# Patient Record
Sex: MALE | Race: BLACK OR AFRICAN AMERICAN | NOT HISPANIC OR LATINO | Employment: OTHER | URBAN - METROPOLITAN AREA
[De-identification: names, ages, dates, MRNs, and addresses within clinical notes are randomized per-mention and may not be internally consistent; named-entity substitution may affect disease eponyms.]

---

## 2023-01-01 ENCOUNTER — CLINICAL SUPPORT (OUTPATIENT)
Dept: PRIMARY CARE | Facility: CLINIC | Age: 73
End: 2023-01-01
Payer: MEDICARE

## 2023-01-01 ENCOUNTER — OFFICE VISIT (OUTPATIENT)
Dept: CARDIOLOGY | Facility: CLINIC | Age: 73
End: 2023-01-01
Payer: MEDICARE

## 2023-01-01 ENCOUNTER — HOSPITAL ENCOUNTER (OUTPATIENT)
Dept: CARDIOLOGY | Facility: HOSPITAL | Age: 73
Discharge: HOME | End: 2023-12-13
Payer: MEDICARE

## 2023-01-01 ENCOUNTER — OFFICE VISIT (OUTPATIENT)
Dept: PRIMARY CARE | Facility: CLINIC | Age: 73
End: 2023-01-01
Payer: MEDICARE

## 2023-01-01 ENCOUNTER — OFFICE VISIT (OUTPATIENT)
Dept: PAIN MEDICINE | Facility: CLINIC | Age: 73
End: 2023-01-01
Payer: MEDICARE

## 2023-01-01 ENCOUNTER — HOSPITAL ENCOUNTER (OUTPATIENT)
Dept: RADIOLOGY | Facility: HOSPITAL | Age: 73
Discharge: HOME | End: 2023-11-01
Payer: MEDICARE

## 2023-01-01 ENCOUNTER — PREP FOR PROCEDURE (OUTPATIENT)
Dept: CARDIOLOGY | Facility: CLINIC | Age: 73
End: 2023-01-01

## 2023-01-01 VITALS
OXYGEN SATURATION: 92 % | BODY MASS INDEX: 19.24 KG/M2 | HEART RATE: 73 BPM | SYSTOLIC BLOOD PRESSURE: 123 MMHG | WEIGHT: 98 LBS | HEIGHT: 60 IN | DIASTOLIC BLOOD PRESSURE: 78 MMHG

## 2023-01-01 VITALS
HEART RATE: 84 BPM | SYSTOLIC BLOOD PRESSURE: 142 MMHG | DIASTOLIC BLOOD PRESSURE: 100 MMHG | RESPIRATION RATE: 18 BRPM | TEMPERATURE: 97.5 F

## 2023-01-01 VITALS
HEART RATE: 74 BPM | SYSTOLIC BLOOD PRESSURE: 123 MMHG | HEIGHT: 60 IN | HEIGHT: 60 IN | BODY MASS INDEX: 18.65 KG/M2 | SYSTOLIC BLOOD PRESSURE: 115 MMHG | WEIGHT: 95 LBS | DIASTOLIC BLOOD PRESSURE: 72 MMHG | BODY MASS INDEX: 18.46 KG/M2 | OXYGEN SATURATION: 91 % | DIASTOLIC BLOOD PRESSURE: 78 MMHG | WEIGHT: 94 LBS | OXYGEN SATURATION: 93 % | HEART RATE: 68 BPM

## 2023-01-01 VITALS
HEART RATE: 62 BPM | BODY MASS INDEX: 18.65 KG/M2 | WEIGHT: 95 LBS | DIASTOLIC BLOOD PRESSURE: 88 MMHG | SYSTOLIC BLOOD PRESSURE: 138 MMHG | HEIGHT: 60 IN

## 2023-01-01 VITALS
BODY MASS INDEX: 18.65 KG/M2 | HEART RATE: 60 BPM | DIASTOLIC BLOOD PRESSURE: 80 MMHG | HEIGHT: 60 IN | SYSTOLIC BLOOD PRESSURE: 150 MMHG | WEIGHT: 95 LBS

## 2023-01-01 DIAGNOSIS — T82.198D INAPPROPRIATE DISCHARGE OF IMPLANTABLE CARDIOVERTER-DEFIBRILLATOR (ICD), SUBSEQUENT ENCOUNTER: ICD-10-CM

## 2023-01-01 DIAGNOSIS — Z98.61 CAD S/P PERCUTANEOUS CORONARY ANGIOPLASTY: ICD-10-CM

## 2023-01-01 DIAGNOSIS — Z89.619 S/P AKA (ABOVE KNEE AMPUTATION) UNILATERAL (MULTI): ICD-10-CM

## 2023-01-01 DIAGNOSIS — Z12.5 SCREENING FOR PROSTATE CANCER: ICD-10-CM

## 2023-01-01 DIAGNOSIS — R91.1 SOLITARY PULMONARY NODULE: ICD-10-CM

## 2023-01-01 DIAGNOSIS — I25.5 ISCHEMIC CARDIOMYOPATHY: ICD-10-CM

## 2023-01-01 DIAGNOSIS — R20.0 ARM NUMBNESS LEFT: ICD-10-CM

## 2023-01-01 DIAGNOSIS — Z95.810 PRESENCE OF DOUBLE CHAMBER AUTOMATIC CARDIOVERTER/DEFIBRILLATOR (AICD): Primary | ICD-10-CM

## 2023-01-01 DIAGNOSIS — Z72.0 CURRENT NICOTINE USE: ICD-10-CM

## 2023-01-01 DIAGNOSIS — E55.9 VITAMIN D DEFICIENCY: ICD-10-CM

## 2023-01-01 DIAGNOSIS — Z01.810 PRE-OPERATIVE CARDIOVASCULAR EXAMINATION, ICD IN PLACE: ICD-10-CM

## 2023-01-01 DIAGNOSIS — I10 BENIGN ESSENTIAL HYPERTENSION: ICD-10-CM

## 2023-01-01 DIAGNOSIS — I73.9 INTERMITTENT CLAUDICATION (CMS-HCC): ICD-10-CM

## 2023-01-01 DIAGNOSIS — I50.9 HEART FAILURE, UNSPECIFIED (MULTI): ICD-10-CM

## 2023-01-01 DIAGNOSIS — R63.6 UNDERWEIGHT: ICD-10-CM

## 2023-01-01 DIAGNOSIS — K21.9 GASTRO-ESOPHAGEAL REFLUX DISEASE WITHOUT ESOPHAGITIS: ICD-10-CM

## 2023-01-01 DIAGNOSIS — Z00.00 ROUTINE GENERAL MEDICAL EXAMINATION AT HEALTH CARE FACILITY: Primary | ICD-10-CM

## 2023-01-01 DIAGNOSIS — G54.7 PHANTOM LIMB (MULTI): ICD-10-CM

## 2023-01-01 DIAGNOSIS — I50.22 CHRONIC SYSTOLIC CONGESTIVE HEART FAILURE, NYHA CLASS 2 (MULTI): ICD-10-CM

## 2023-01-01 DIAGNOSIS — E78.5 HYPERLIPIDEMIA, UNSPECIFIED HYPERLIPIDEMIA TYPE: ICD-10-CM

## 2023-01-01 DIAGNOSIS — Z13.9 SCREENING FOR CONDITION: ICD-10-CM

## 2023-01-01 DIAGNOSIS — F17.200 CURRENT SMOKER: ICD-10-CM

## 2023-01-01 DIAGNOSIS — E78.2 MIXED HYPERLIPIDEMIA: ICD-10-CM

## 2023-01-01 DIAGNOSIS — I25.5 CARDIOMYOPATHY, ISCHEMIC: ICD-10-CM

## 2023-01-01 DIAGNOSIS — Z95.810 PRE-OPERATIVE CARDIOVASCULAR EXAMINATION, ICD IN PLACE: ICD-10-CM

## 2023-01-01 DIAGNOSIS — J44.9 CHRONIC OBSTRUCTIVE PULMONARY DISEASE, UNSPECIFIED COPD TYPE (MULTI): ICD-10-CM

## 2023-01-01 DIAGNOSIS — I25.10 CAD S/P PERCUTANEOUS CORONARY ANGIOPLASTY: ICD-10-CM

## 2023-01-01 DIAGNOSIS — F17.200 CURRENT EVERY DAY SMOKER: ICD-10-CM

## 2023-01-01 DIAGNOSIS — Z00.00 PREVENTATIVE HEALTH CARE: Primary | ICD-10-CM

## 2023-01-01 DIAGNOSIS — Z79.899 DRUG THERAPY: ICD-10-CM

## 2023-01-01 DIAGNOSIS — G54.6 PHANTOM PAIN AFTER AMPUTATION OF LOWER EXTREMITY (MULTI): Primary | ICD-10-CM

## 2023-01-01 DIAGNOSIS — I25.10 CORONARY ARTERY DISEASE INVOLVING NATIVE HEART WITHOUT ANGINA PECTORIS, UNSPECIFIED VESSEL OR LESION TYPE: ICD-10-CM

## 2023-01-01 DIAGNOSIS — I25.85 CHRONIC CORONARY MICROVASCULAR DYSFUNCTION: ICD-10-CM

## 2023-01-01 DIAGNOSIS — I47.20 VT (VENTRICULAR TACHYCARDIA) (MULTI): ICD-10-CM

## 2023-01-01 DIAGNOSIS — I25.10 CORONARY ARTERY DISEASE INVOLVING NATIVE CORONARY ARTERY OF NATIVE HEART WITHOUT ANGINA PECTORIS: ICD-10-CM

## 2023-01-01 DIAGNOSIS — Z95.810 PRESENCE OF DOUBLE CHAMBER AUTOMATIC CARDIOVERTER/DEFIBRILLATOR (AICD): ICD-10-CM

## 2023-01-01 DIAGNOSIS — I73.9 PVD (PERIPHERAL VASCULAR DISEASE) (CMS-HCC): ICD-10-CM

## 2023-01-01 DIAGNOSIS — R06.02 SOB (SHORTNESS OF BREATH): Primary | ICD-10-CM

## 2023-01-01 DIAGNOSIS — N52.9 ERECTILE DYSFUNCTION, UNSPECIFIED ERECTILE DYSFUNCTION TYPE: ICD-10-CM

## 2023-01-01 DIAGNOSIS — G54.7: ICD-10-CM

## 2023-01-01 DIAGNOSIS — F17.210 NICOTINE DEPENDENCE, CIGARETTES, UNCOMPLICATED: ICD-10-CM

## 2023-01-01 LAB
CALCIDIOL (25 OH VITAMIN D3) (NG/ML) IN SER/PLAS: 29 NG/ML
CALCIDIOL (25 OH VITAMIN D3) (NG/ML) IN SER/PLAS: 39 NG/ML

## 2023-01-01 PROCEDURE — 3008F BODY MASS INDEX DOCD: CPT | Performed by: INTERNAL MEDICINE

## 2023-01-01 PROCEDURE — G0439 PPPS, SUBSEQ VISIT: HCPCS | Performed by: FAMILY MEDICINE

## 2023-01-01 PROCEDURE — 4004F PT TOBACCO SCREEN RCVD TLK: CPT | Performed by: FAMILY MEDICINE

## 2023-01-01 PROCEDURE — 1159F MED LIST DOCD IN RCRD: CPT | Performed by: NURSE PRACTITIONER

## 2023-01-01 PROCEDURE — 99214 OFFICE O/P EST MOD 30 MIN: CPT | Performed by: INTERNAL MEDICINE

## 2023-01-01 PROCEDURE — 93000 ELECTROCARDIOGRAM COMPLETE: CPT | Performed by: INTERNAL MEDICINE

## 2023-01-01 PROCEDURE — 4004F PT TOBACCO SCREEN RCVD TLK: CPT | Performed by: NURSE PRACTITIONER

## 2023-01-01 PROCEDURE — 99213 OFFICE O/P EST LOW 20 MIN: CPT | Performed by: NURSE PRACTITIONER

## 2023-01-01 PROCEDURE — 1125F AMNT PAIN NOTED PAIN PRSNT: CPT | Performed by: NURSE PRACTITIONER

## 2023-01-01 PROCEDURE — 4004F PT TOBACCO SCREEN RCVD TLK: CPT | Performed by: INTERNAL MEDICINE

## 2023-01-01 PROCEDURE — 3077F SYST BP >= 140 MM HG: CPT | Performed by: INTERNAL MEDICINE

## 2023-01-01 PROCEDURE — 1159F MED LIST DOCD IN RCRD: CPT | Performed by: FAMILY MEDICINE

## 2023-01-01 PROCEDURE — 1125F AMNT PAIN NOTED PAIN PRSNT: CPT | Performed by: FAMILY MEDICINE

## 2023-01-01 PROCEDURE — 71271 CT THORAX LUNG CANCER SCR C-: CPT | Performed by: RADIOLOGY

## 2023-01-01 PROCEDURE — 3079F DIAST BP 80-89 MM HG: CPT | Performed by: INTERNAL MEDICINE

## 2023-01-01 PROCEDURE — 99214 OFFICE O/P EST MOD 30 MIN: CPT | Performed by: FAMILY MEDICINE

## 2023-01-01 PROCEDURE — 99215 OFFICE O/P EST HI 40 MIN: CPT | Performed by: INTERNAL MEDICINE

## 2023-01-01 PROCEDURE — 3077F SYST BP >= 140 MM HG: CPT | Performed by: NURSE PRACTITIONER

## 2023-01-01 PROCEDURE — 99213 OFFICE O/P EST LOW 20 MIN: CPT | Mod: ZK | Performed by: NURSE PRACTITIONER

## 2023-01-01 PROCEDURE — 3075F SYST BP GE 130 - 139MM HG: CPT | Performed by: INTERNAL MEDICINE

## 2023-01-01 PROCEDURE — 3074F SYST BP LT 130 MM HG: CPT | Performed by: FAMILY MEDICINE

## 2023-01-01 PROCEDURE — 1159F MED LIST DOCD IN RCRD: CPT | Performed by: INTERNAL MEDICINE

## 2023-01-01 PROCEDURE — 82306 VITAMIN D 25 HYDROXY: CPT

## 2023-01-01 PROCEDURE — 93290 INTERROG DEV EVAL ICPMS IP: CPT | Performed by: INTERNAL MEDICINE

## 2023-01-01 PROCEDURE — 1125F AMNT PAIN NOTED PAIN PRSNT: CPT | Performed by: INTERNAL MEDICINE

## 2023-01-01 PROCEDURE — 3078F DIAST BP <80 MM HG: CPT | Performed by: FAMILY MEDICINE

## 2023-01-01 PROCEDURE — 99397 PER PM REEVAL EST PAT 65+ YR: CPT | Performed by: FAMILY MEDICINE

## 2023-01-01 PROCEDURE — 1170F FXNL STATUS ASSESSED: CPT | Performed by: FAMILY MEDICINE

## 2023-01-01 PROCEDURE — 93283 PRGRMG EVAL IMPLANTABLE DFB: CPT | Performed by: INTERNAL MEDICINE

## 2023-01-01 PROCEDURE — 93283 PRGRMG EVAL IMPLANTABLE DFB: CPT

## 2023-01-01 PROCEDURE — 3080F DIAST BP >= 90 MM HG: CPT | Performed by: NURSE PRACTITIONER

## 2023-01-01 PROCEDURE — 71271 CT THORAX LUNG CANCER SCR C-: CPT

## 2023-01-01 RX ORDER — ALBUTEROL SULFATE 0.83 MG/ML
SOLUTION RESPIRATORY (INHALATION)
COMMUNITY

## 2023-01-01 RX ORDER — GABAPENTIN 300 MG/1
CAPSULE ORAL
COMMUNITY
Start: 2015-09-18 | End: 2023-01-01

## 2023-01-01 RX ORDER — PANTOPRAZOLE SODIUM 40 MG/1
TABLET, DELAYED RELEASE ORAL
Qty: 90 TABLET | Refills: 3 | Status: SHIPPED | OUTPATIENT
Start: 2023-01-01 | End: 2024-01-01

## 2023-01-01 RX ORDER — METHADONE HYDROCHLORIDE 5 MG/1
5 TABLET ORAL EVERY 6 HOURS
COMMUNITY

## 2023-01-01 RX ORDER — AMLODIPINE BESYLATE 5 MG/1
5 TABLET ORAL DAILY
COMMUNITY

## 2023-01-01 RX ORDER — LISINOPRIL 5 MG/1
5 TABLET ORAL DAILY
COMMUNITY

## 2023-01-01 RX ORDER — CHOLECALCIFEROL (VITAMIN D3) 25 MCG
TABLET ORAL
COMMUNITY

## 2023-01-01 RX ORDER — METHADONE HYDROCHLORIDE 5 MG/1
5 TABLET ORAL EVERY 6 HOURS PRN
Qty: 120 TABLET | Refills: 0 | Status: SHIPPED | OUTPATIENT
Start: 2024-01-01 | End: 2024-01-01

## 2023-01-01 RX ORDER — SILDENAFIL 100 MG/1
TABLET, FILM COATED ORAL
Status: ON HOLD | COMMUNITY
Start: 2021-12-09 | End: 2024-01-01 | Stop reason: ALTCHOICE

## 2023-01-01 RX ORDER — NAPROXEN SODIUM 220 MG/1
1 TABLET, FILM COATED ORAL DAILY
COMMUNITY

## 2023-01-01 RX ORDER — ATORVASTATIN CALCIUM 40 MG/1
40 TABLET, FILM COATED ORAL NIGHTLY
COMMUNITY

## 2023-01-01 RX ORDER — ALBUTEROL SULFATE 90 UG/1
AEROSOL, METERED RESPIRATORY (INHALATION)
COMMUNITY
Start: 2023-01-01

## 2023-01-01 RX ORDER — NALOXONE HYDROCHLORIDE 4 MG/.1ML
SPRAY NASAL
COMMUNITY
Start: 2020-02-03

## 2023-01-01 RX ORDER — FUROSEMIDE 20 MG/1
20 TABLET ORAL DAILY
COMMUNITY

## 2023-01-01 RX ORDER — GABAPENTIN 300 MG/1
CAPSULE ORAL
Qty: 1080 CAPSULE | Refills: 3 | Status: SHIPPED | OUTPATIENT
Start: 2023-01-01 | End: 2024-01-01

## 2023-01-01 RX ORDER — METOPROLOL SUCCINATE 50 MG/1
50 TABLET, EXTENDED RELEASE ORAL EVERY EVENING
COMMUNITY
End: 2023-01-01 | Stop reason: WASHOUT

## 2023-01-01 RX ORDER — SODIUM CHLORIDE 9 MG/ML
100 INJECTION, SOLUTION INTRAVENOUS CONTINUOUS
Status: CANCELLED | OUTPATIENT
Start: 2023-01-01

## 2023-01-01 RX ORDER — METHADONE HYDROCHLORIDE 5 MG/1
5 TABLET ORAL EVERY 6 HOURS PRN
Qty: 120 TABLET | Refills: 0 | Status: SHIPPED | OUTPATIENT
Start: 2023-01-01 | End: 2024-01-01 | Stop reason: HOSPADM

## 2023-01-01 RX ORDER — METHADONE HYDROCHLORIDE 5 MG/1
5 TABLET ORAL EVERY 6 HOURS PRN
Qty: 120 TABLET | Refills: 0 | Status: SHIPPED | OUTPATIENT
Start: 2024-01-01 | End: 2024-01-01 | Stop reason: HOSPADM

## 2023-01-01 RX ORDER — TAMSULOSIN HYDROCHLORIDE 0.4 MG/1
1 CAPSULE ORAL DAILY
COMMUNITY
Start: 2017-08-08 | End: 2023-01-01 | Stop reason: ALTCHOICE

## 2023-01-01 RX ORDER — NITROGLYCERIN 0.4 MG/1
TABLET SUBLINGUAL
COMMUNITY
Start: 2019-01-10

## 2023-01-01 RX ORDER — CLOPIDOGREL BISULFATE 75 MG/1
75 TABLET ORAL DAILY
COMMUNITY

## 2023-01-01 RX ORDER — METOPROLOL SUCCINATE 50 MG/1
50 TABLET, EXTENDED RELEASE ORAL EVERY EVENING
Qty: 90 TABLET | Refills: 3 | Status: SHIPPED | OUTPATIENT
Start: 2023-01-01 | End: 2024-01-01

## 2023-01-01 ASSESSMENT — ACTIVITIES OF DAILY LIVING (ADL)
TAKING_MEDICATION: INDEPENDENT
MANAGING_FINANCES: INDEPENDENT
BATHING: INDEPENDENT
DRESSING: INDEPENDENT
DOING_HOUSEWORK: INDEPENDENT
GROCERY_SHOPPING: INDEPENDENT

## 2023-01-01 ASSESSMENT — ENCOUNTER SYMPTOMS
OCCASIONAL FEELINGS OF UNSTEADINESS: 0
LOSS OF SENSATION IN FEET: 1
DEPRESSION: 0

## 2023-01-01 ASSESSMENT — PAIN SCALES - GENERAL
PAINLEVEL_OUTOF10: 8
PAINLEVEL: 8

## 2023-01-01 ASSESSMENT — COLUMBIA-SUICIDE SEVERITY RATING SCALE - C-SSRS
1. IN THE PAST MONTH, HAVE YOU WISHED YOU WERE DEAD OR WISHED YOU COULD GO TO SLEEP AND NOT WAKE UP?: NO
2. HAVE YOU ACTUALLY HAD ANY THOUGHTS OF KILLING YOURSELF?: NO
6. HAVE YOU EVER DONE ANYTHING, STARTED TO DO ANYTHING, OR PREPARED TO DO ANYTHING TO END YOUR LIFE?: NO

## 2023-01-01 ASSESSMENT — PATIENT HEALTH QUESTIONNAIRE - PHQ9
1. LITTLE INTEREST OR PLEASURE IN DOING THINGS: NOT AT ALL
2. FEELING DOWN, DEPRESSED OR HOPELESS: NOT AT ALL
SUM OF ALL RESPONSES TO PHQ9 QUESTIONS 1 AND 2: 0

## 2023-01-01 ASSESSMENT — PAIN - FUNCTIONAL ASSESSMENT: PAIN_FUNCTIONAL_ASSESSMENT: 0-10

## 2023-06-08 PROBLEM — G54.7: Status: ACTIVE | Noted: 2023-01-01

## 2023-06-08 PROBLEM — Z72.0 CURRENT NICOTINE USE: Status: ACTIVE | Noted: 2023-01-01

## 2023-06-08 PROBLEM — Z79.899 DRUG THERAPY: Status: ACTIVE | Noted: 2023-01-01

## 2023-06-08 PROBLEM — I25.10 CORONARY ARTERY DISEASE INVOLVING NATIVE HEART WITHOUT ANGINA PECTORIS: Status: ACTIVE | Noted: 2023-01-01

## 2023-06-08 PROBLEM — E78.5 HYPERLIPIDEMIA: Status: ACTIVE | Noted: 2023-01-01

## 2023-06-08 PROBLEM — E55.9 VITAMIN D DEFICIENCY: Status: ACTIVE | Noted: 2023-01-01

## 2023-06-08 PROBLEM — J44.9 CHRONIC OBSTRUCTIVE PULMONARY DISEASE (MULTI): Status: ACTIVE | Noted: 2023-01-01

## 2023-06-08 PROBLEM — N52.9 ERECTILE DYSFUNCTION: Status: ACTIVE | Noted: 2023-01-01

## 2023-06-08 PROBLEM — Z00.00 ROUTINE GENERAL MEDICAL EXAMINATION AT HEALTH CARE FACILITY: Status: ACTIVE | Noted: 2023-01-01

## 2023-06-08 NOTE — PATIENT INSTRUCTIONS
Patient Discussion/Summary     Patient's med [employer] insurance wants annual preventative visit by itself.  We will do Medicare wellness today and preventative next time.     --------  UTD on PCV13. Due for PPSV 23.   Due for Tdap.  UTD on coronavirus series including 2nd booster.     screen for hepatitis C was nonreactive in December 2022.      Screening for colon cancer.  Cologuard was nonreactive December 2022.  We will plan to recheck around December 2025.       Screen for prostate cancer. PSA was 1.48, totally normal. -->> We will check annually.  -------     New labs reviewed:  - Vitamin D deficiency, 29, was 22, 32, 70, 53, 53, 26, 38, 31, 26. Would like to 75 - 100. Is now on 2000 units. -->>  Increase to 4000 to 5000 units daily.  We will recheck  level in 3 to 6 months.     Regarding previous labs:  -Drug therapy, screening for conditions. A1c was 5.2, was 5.5, so no diabetes.  - hyperlipidemia, HDL 36, was 33, 36, goal is 45 or more. LDL 61, was 48, 57, goal is less than 70. On Simvastatin from cardiology. -->> f/u with cardiology.  - Not anemic but is macrocytic on the CBC. Hemoglobin WNL. Fairly stable.B12 and folic acid have been grossly within normal limits.-->> Will follow. We will plan to just check CBC from now on unless anemia or other problems show up again.     COPD. --->> Follow-up as scheduled with Dr. Guillaume.      Smoking about 4 cigs/day, down from one fourth pack a day, down from half pack a day, down from a pack and a half a day. -->> Keep up the good work cutting back. Advised to completely quit.     Coronary artery disease, implanted defibrillator, on Plavix, history of angina, CHF, intermittent claudication, peripheral vascular disease. He does see Rhianna Suarez and Michel/Bridger. In 2019 had 3 cardiac stents put in. Patient does notice shortness of breath has improved at least a little. -->> follow-up with cardiology as planned.     Phantom limb pain, also chronic pain, is on gabapentin 600mg  TID for the phantom limb pain, and is in general it well enough, maybe once or twice a week is getting the pain so severe that he takes an extra 2 gabapentin and that controls it pretty well. Does note sometimes he takes only one a day, sometimes takes 4. Sees pain management for chronic pain. Is prescribed methadone there. -->> Follow up as planned. Will prescribe a larger quantity of gabapentin so patient does not run out.     Erectile dysfunction, doing a little better now that he is on Cialis/tadalafil. -->> Will refill as needed.         - Patient will return in about 3 months for annual preventative visit with vit D review, only. (Trying to make it short appointment so we can get away with just billing for preventative visit.)  - Patient will return here about a week before the appointment to get vitamin D level drawn.

## 2023-06-08 NOTE — PROGRESS NOTES
Subjective   Patient ID: Luis Peacock Jr. is a 73 y.o. male who presents for Medicare Wellness Exam (Pt in today for his Medicare Wellness Exam).          Patient Discussion/Summary     Patient's med [employer] insurance wants annual preventative visit by itself.  We will do Medicare wellness today and preventative next time.     --------  UTD on PCV13. Due for PPSV 23.   Due for Tdap.  UTD on coronavirus series including 2nd booster.     screen for hepatitis C was nonreactive in December 2022.      Screening for colon cancer.  Cologuard was nonreactive December 2022.  We will plan to recheck around December 2025.       Screen for prostate cancer. PSA was 1.48, totally normal. -->> We will check annually.  -------     New labs reviewed:  - Vitamin D deficiency, 29, was 22, 32, 70, 53, 53, 26, 38, 31, 26. Would like to 75 - 100. Is now on 2000 units. -->>  Increase to 4000 to 5000 units daily.  We will recheck  level in 3 to 6 months.     Regarding previous labs:  -Drug therapy, screening for conditions. A1c was 5.2, was 5.5, so no diabetes.  - hyperlipidemia, HDL 36, was 33, 36, goal is 45 or more. LDL 61, was 48, 57, goal is less than 70. On Simvastatin from cardiology. -->> f/u with cardiology.  - Not anemic but is macrocytic on the CBC. Hemoglobin WNL. Fairly stable.B12 and folic acid have been grossly within normal limits.-->> Will follow. We will plan to just check CBC from now on unless anemia or other problems show up again.     COPD. --->> Follow-up as scheduled with Dr. Guillaume.      Smoking about 4 cigs/day, down from one fourth pack a day, down from half pack a day, down from a pack and a half a day. -->> Keep up the good work cutting back. Advised to completely quit.     Coronary artery disease, implanted defibrillator, on Plavix, history of angina, CHF, intermittent claudication, peripheral vascular disease. He does see Rhianna Suarez and Michel/Bridger. In 2019 had 3 cardiac stents put in. Patient does notice  shortness of breath has improved at least a little. -->> follow-up with cardiology as planned.     Phantom limb pain, also chronic pain, is on gabapentin 600mg TID for the phantom limb pain, and is in general it well enough, maybe once or twice a week is getting the pain so severe that he takes an extra 2 gabapentin and that controls it pretty well. Does note sometimes he takes only one a day, sometimes takes 4. Sees pain management for chronic pain. Is prescribed methadone there. -->> Follow up as planned. Will prescribe a larger quantity of gabapentin so patient does not run out.     Erectile dysfunction, doing a little better now that he is on Cialis/tadalafil. -->> Will refill as needed.         - Patient will return in about 3 months for annual preventative visit with vit D review, only. (Trying to make it short appointment so we can get away with just billing for preventative visit.)  - Patient will return here about a week before the appointment to get vitamin D level drawn.        Subjective   Reason for Visit: Luis Peacock Jr. is an 73 y.o. male here for a Medicare Wellness visit.                 Patient Care Team:  Aiden Bui DO as PCP - General     Review of Systems  Denies N/V/D/C, no HA/S/V, no CP/SOB. Denies fevers/chills.   All other systems were negative.    Objective   Vitals:  /78 (BP Location: Left arm, Patient Position: Sitting)   Pulse 74   Ht 1.524 m (5')   Wt (!) 43.1 kg (95 lb)   SpO2 91%   BMI 18.55 kg/m²       Physical Exam  Gen: NAD  eyes: EOMI, PERRLA  ENT: hearing grossly intact, no nasal discharge  resp: CTABL, without R/R  heart: RRR without MRG  GI: abd: S/ND/NT, BS+  lymph: no axillary, cervical, supraclavicular lymphadenopathy noted   MS: ambulates with crutches, post R AKA.  derm: no rashes or lesions noted  neuro: CN II-XII grossly intact  psych: A&Ox3      Assessment/Plan   Problem List Items Addressed This Visit    None  Visit Diagnoses       Routine general  medical examination at health care facility    -  Primary    Vitamin D deficiency        Relevant Orders    Vitamin D 25-Hydroxy,Total    Drug therapy        Hyperlipidemia, unspecified hyperlipidemia type        Chronic obstructive pulmonary disease, unspecified COPD type (CMS/[facility])        Coronary artery disease involving native heart without angina pectoris, unspecified vessel or lesion type        Current nicotine use        Phantom limb (CMS/[facility])        Erectile dysfunction, unspecified erectile dysfunction type

## 2023-08-31 NOTE — PROGRESS NOTES
Subjective   Patient ID: Lius Peacock Jr. is a 73 y.o. male who presents for Labs Only (Pt in today for lab draw).    HPI     Review of Systems    Objective   There were no vitals taken for this visit.    Physical Exam    Assessment/Plan

## 2023-09-07 PROBLEM — Z00.00 PREVENTATIVE HEALTH CARE: Status: ACTIVE | Noted: 2023-01-01

## 2023-09-07 NOTE — PROGRESS NOTES
Subjective   Patient ID: Luis Peacock Jr. is a 73 y.o. male who presents for annual preventative visit and vitamin D lab review.    Review of Systems  Denies N/V/D/C, no HA/S/V, denies rashes/lesions, no CP/SOB. Denies fevers/chills.  All other systems were negative.    Objective   Physical Exam  Gen: NAD  eyes: EOMI, PERRLA  ENT: hearing grossly intact, no nasal discharge  resp: CTABL, without R/R  heart: RRR without MRG  GI: abd: S/ND/NT, BS+  lymph: no axillary, cervical, supraclavicular lymphadenopathy noted   MS: ambulates with crutches, post R AKA.  derm: no rashes or lesions noted  neuro: CN II-XII grossly intact  psych: A&Ox3    Assessment/Plan   Diagnoses and all orders for this visit:  Preventative health care  -     CBC and Auto Differential; Future  -     Comprehensive Metabolic Panel; Future  -     TSH with reflex to Free T4 if abnormal; Future  -     Magnesium; Future  -     Lipid Panel; Future  -     Hemoglobin A1C; Future  -     Urinalysis with Reflex Microscopic; Future  -     Vitamin D 25-Hydroxy,Total (for eval of Vitamin D levels); Future  Vitamin D deficiency  -     Vitamin D 25-Hydroxy,Total (for eval of Vitamin D levels); Future  Drug therapy  -     CBC and Auto Differential; Future  -     Comprehensive Metabolic Panel; Future  -     Magnesium; Future  -     Urinalysis with Reflex Microscopic; Future  Screening for condition  -     TSH with reflex to Free T4 if abnormal; Future  -     Lipid Panel; Future  -     Hemoglobin A1C; Future  Screening for prostate cancer  -     Prostate Spec.Ag,Screen; Future        Patient Discussion/Summary     Patient's med [employer] insurance wants annual preventative visit by itself.  We will do Medicare wellness today and preventative next time.     --------  UTD on PCV13. Due for PCV20.  Due for Tdap.  UTD on coronavirus series including bivalent booster.     screen for hepatitis C was nonreactive in December 2022.      Screening for colon cancer.  Cologuard was  nonreactive December 2022.  We will plan to recheck around December 2025.       Screen for prostate cancer. PSA was 1.48, totally normal. -->> We will check annually.  -------     New labs reviewed:  - Vitamin D deficiency, 39, was 29, 22, 32, 70, 53, 53, 26, 38, 31, 26. Would like to 75 - 100. Is now on 2000 units. -->> Increase to 4000 to 5000 units daily.  We will recheck  level in 3 to 6 months.     Regarding previous labs:  -Drug therapy, screening for conditions. A1c was 5.2, was 5.5, so no diabetes.  - hyperlipidemia, HDL 36, was 33, 36, goal is 45 or more. LDL 61, was 48, 57, goal is less than 70. On Simvastatin from cardiology. -->> f/u with cardiology.  - Not anemic but is macrocytic on the CBC. Hemoglobin WNL. Fairly stable.B12 and folic acid have been grossly within normal limits.-->> Will follow. We will plan to just check CBC from now on unless anemia or other problems show up again.     COPD. --->> Follow-up as scheduled with Dr. Guillaume.      Smoking about 4 cigs/day, down from one fourth pack a day, down from half pack a day, down from a pack and a half a day. -->> Keep up the good work cutting back. Advised to completely quit.     Coronary artery disease, implanted defibrillator, on Plavix, history of angina, CHF, intermittent claudication, peripheral vascular disease. He does see Rhianna Suarez and Michel/Bridger. In 2019 had 3 cardiac stents put in. Patient does notice shortness of breath has improved at least a little. -->> follow-up with cardiology as planned.     Phantom limb pain, also chronic pain, is on gabapentin 600mg TID for the phantom limb pain, and is in general it well enough, maybe once or twice a week is getting the pain so severe that he takes an extra 2 gabapentin and that controls it pretty well. Does note sometimes he takes only one a day, sometimes takes 4. Sees pain management for chronic pain. Is prescribed methadone there. -->> Follow up as planned. Will prescribe a larger quantity  of gabapentin so patient does not run out.     Erectile dysfunction, doing a little better now that he is on Cialis/tadalafil. -->> Will refill as needed.        - Return in about 6 months for annual lab review.  - Return here about a week before your next appointment to get fasting annual labs including PSA screening drawn.

## 2023-09-07 NOTE — PATIENT INSTRUCTIONS
Patient Discussion/Summary     Patient's med [employer] insurance wants annual preventative visit by itself.  We will do Medicare wellness today and preventative next time.     --------  UTD on PCV13. Due for PCV20.  Due for Tdap.  UTD on coronavirus series including bivalent booster.     screen for hepatitis C was nonreactive in December 2022.      Screening for colon cancer.  Cologuard was nonreactive December 2022.  We will plan to recheck around December 2025.       Screen for prostate cancer. PSA was 1.48, totally normal. -->> We will check annually.  -------     New labs reviewed:  - Vitamin D deficiency, 39, was 29, 22, 32, 70, 53, 53, 26, 38, 31, 26. Would like to 75 - 100. Is now on 2000 units. -->> Increase to 4000 to 5000 units daily.  We will recheck  level in 3 to 6 months.     Regarding previous labs:  -Drug therapy, screening for conditions. A1c was 5.2, was 5.5, so no diabetes.  - hyperlipidemia, HDL 36, was 33, 36, goal is 45 or more. LDL 61, was 48, 57, goal is less than 70. On Simvastatin from cardiology. -->> f/u with cardiology.  - Not anemic but is macrocytic on the CBC. Hemoglobin WNL. Fairly stable.B12 and folic acid have been grossly within normal limits.-->> Will follow. We will plan to just check CBC from now on unless anemia or other problems show up again.     COPD. --->> Follow-up as scheduled with Dr. Guillaume.      Smoking about 4 cigs/day, down from one fourth pack a day, down from half pack a day, down from a pack and a half a day. -->> Keep up the good work cutting back. Advised to completely quit.     Coronary artery disease, implanted defibrillator, on Plavix, history of angina, CHF, intermittent claudication, peripheral vascular disease. He does see Rhianna Suarez and Michel/Bridger. In 2019 had 3 cardiac stents put in. Patient does notice shortness of breath has improved at least a little. -->> follow-up with cardiology as planned.     Phantom limb pain, also chronic pain, is on gabapentin  600mg TID for the phantom limb pain, and is in general it well enough, maybe once or twice a week is getting the pain so severe that he takes an extra 2 gabapentin and that controls it pretty well. Does note sometimes he takes only one a day, sometimes takes 4. Sees pain management for chronic pain. Is prescribed methadone there. -->> Follow up as planned. Will prescribe a larger quantity of gabapentin so patient does not run out.     Erectile dysfunction, doing a little better now that he is on Cialis/tadalafil. -->> Will refill as needed.         - Return in about 6 months for annual lab review.  - Return here about a week before your next appointment to get fasting annual labs including PSA screening drawn.

## 2023-10-11 PROBLEM — R06.89 DIFFICULTY BREATHING: Status: ACTIVE | Noted: 2023-01-01

## 2023-10-11 PROBLEM — I10 BENIGN ESSENTIAL HYPERTENSION: Status: ACTIVE | Noted: 2023-01-01

## 2023-10-11 PROBLEM — Z95.810 PRESENCE OF DOUBLE CHAMBER AUTOMATIC CARDIOVERTER/DEFIBRILLATOR (AICD): Status: ACTIVE | Noted: 2023-01-01

## 2023-10-11 PROBLEM — Z89.619: Status: ACTIVE | Noted: 2023-01-01

## 2023-10-11 PROBLEM — M79.605 PAIN IN BOTH LOWER EXTREMITIES: Status: ACTIVE | Noted: 2023-01-01

## 2023-10-11 PROBLEM — I73.9 INTERMITTENT CLAUDICATION (CMS-HCC): Status: ACTIVE | Noted: 2023-01-01

## 2023-10-11 PROBLEM — R63.6 UNDERWEIGHT: Status: ACTIVE | Noted: 2023-01-01

## 2023-10-11 PROBLEM — I25.10 CAD S/P PERCUTANEOUS CORONARY ANGIOPLASTY: Status: ACTIVE | Noted: 2023-01-01

## 2023-10-11 PROBLEM — I73.9 PVD (PERIPHERAL VASCULAR DISEASE) (CMS-HCC): Status: ACTIVE | Noted: 2023-01-01

## 2023-10-11 PROBLEM — F17.200 CURRENT EVERY DAY SMOKER: Status: ACTIVE | Noted: 2023-01-01

## 2023-10-11 PROBLEM — I25.10 CAD (CORONARY ARTERY DISEASE): Status: ACTIVE | Noted: 2023-01-01

## 2023-10-11 PROBLEM — M47.816 LUMBAR SPONDYLOSIS: Status: ACTIVE | Noted: 2023-01-01

## 2023-10-11 PROBLEM — I25.5 ISCHEMIC CARDIOMYOPATHY: Status: ACTIVE | Noted: 2023-01-01

## 2023-10-11 PROBLEM — Z98.61 CAD S/P PERCUTANEOUS CORONARY ANGIOPLASTY: Status: ACTIVE | Noted: 2023-01-01

## 2023-10-11 PROBLEM — M79.605 LEFT LEG PAIN: Status: ACTIVE | Noted: 2023-01-01

## 2023-10-11 PROBLEM — K21.9 GERD (GASTROESOPHAGEAL REFLUX DISEASE): Status: ACTIVE | Noted: 2023-01-01

## 2023-10-11 PROBLEM — I50.9 CHF (CONGESTIVE HEART FAILURE), NYHA CLASS II (MULTI): Status: ACTIVE | Noted: 2023-01-01

## 2023-10-11 PROBLEM — M79.604 PAIN IN BOTH LOWER EXTREMITIES: Status: ACTIVE | Noted: 2023-01-01

## 2023-10-11 PROBLEM — R82.998 WHITE BLOOD CELLS PRESENT ON URINE MICROSCOPY: Status: ACTIVE | Noted: 2023-01-01

## 2023-10-11 PROBLEM — M54.9 BACK PAIN: Status: ACTIVE | Noted: 2023-01-01

## 2023-12-11 NOTE — PROGRESS NOTES
Subjective   Patient ID: Luis Peacock Jr. is a 73 y.o. male who presents for No chief complaint on file..  HPI  73 years old, male here today for follow up to refill medications. He is known in this clinic because of chronic neck pain, chronic back pain, chronic right shoulder pain. He is maintained on Methadone and he confirms that they are still moderately beneficial in controlling his pain issues. His level of pain is about 4 to 5/10.   [place]AR[place] obtained and reviewed,  no abuse or misuse with prescribed medication noted.  He is able to function and perform activities of daily living on an independent basis.   Review of Systems  Review of systems x 10 is negative.  No recent injury, falls reported.  No recent change in medical condition reported.  No recent weakness reported.  Still able to control bowel and bladder function.  Denies fever, cough, shortness of breath at this time.  No interval change with medical/health issues noted.  Objective   Physical Exam  Awake, alert, , no acute distress, appropriate.  Spine is of normal curvature.  Full ROM on all 4 extremities,sensation and motor function intact, no vascular compromise,  no pedal edema, normal gait using crutches with ambulation.  Skin warm and dry, intact, turgor is normal.  Denies any numbness, tingling.  Right side AKA.  BP (!) 142/100   Pulse 84   Temp 36.4 °C (97.5 °F)   Resp 18       Assessment/Plan     I have personally reviewed the OARRS report for this patient. I have considered the risks of abuse, dependence, addiction and diversion. I believe that it is clinically appropriate for this patient to be prescribed this medication based on documented diagnosis.  I believe the benefits of the continuation of the opiate outweighs the risk. Patient has described positive response to the present medication therapy. Patient denies any side effects associated with the usage of the medication. Patient did not elicit any signs supportive of misuse or abuse.  The review of the [place] Automated Reporting prescription is not suggestive of any worrisome pattern. Patient believes the usage of this medication has improved the quality of life and allow him to participate in day-to-day activity. Therefore I recommend the continuation of this medication and I will be refilling the medication prescription.  Continue Methadone from this clinic, Gabapentin from his PCP.  Continue doing safe guidelines for preventing spread of Corona virus.  Advised to follow up in 3 months or as needed basis.  Explained plan to this patient, and patient verbalized understanding and agreement with the plan.     Provider Impressions     Chronic neck pain associated with to cervicalgia, cervical spondylosis, osteoarthritic pains, this pain is under control at this time.  Chronic back pain associated with lumbago, lumbar spondylosis, lumbar radiculopathy,  , vascular disease. He is s/p right AKA with phantom pain.  Chronic right shoulder pain associated with osteoarthritic pains, history of torn rotator cuff.    Johanna Posada, MICAELA-CNP 12/11/23 10:36 AM

## 2023-12-12 NOTE — PROGRESS NOTES
Subjective   Patient ID: Luis Peacock Jr. is a 73 y.o. male who presents for annual preventative visit and vitamin D lab review.    Review of Systems  Denies N/V/D/C, no S/V, denies rashes/lesions, no CP. Denies fevers/chills.  Positive for occasional headache, getting shortness of breath at rest at times.  Also some abdominal pain.  All other systems were negative.    Objective   Physical Exam  Gen: NAD  eyes: EOMI, PERRLA  ENT: hearing grossly intact, no nasal discharge  resp: CTABL, without R/R  heart: RRR without MRG  GI: abd: S/ND/NT, BS+  lymph: no axillary, cervical, supraclavicular lymphadenopathy noted   MS: ambulates with crutches, post R AKA.  derm: no rashes or lesions noted  neuro: CN II-XII grossly intact  psych: A&Ox3    Assessment/Plan   Diagnoses and all orders for this visit:  SOB (shortness of breath)  Chronic obstructive pulmonary disease, unspecified COPD type (CMS/HCC)  Coronary artery disease involving native coronary artery of native heart without angina pectoris  Current nicotine use  Phantom limb (CMS/HCC)  CAD S/P percutaneous coronary angioplasty  S/P AKA (above knee amputation) unilateral (CMS/HCC)

## 2023-12-12 NOTE — PATIENT INSTRUCTIONS
COPD. CT from November showed new spot, that they thought might be a scar.  Sounds like pulmonary medicine is already planning to recheck that around May or so. --->> Follow-up as scheduled with Dr. Guillaume.    Shortness of breath, abdominal pain.  Getting abdominal pain, with shortness of breath that are relieved after defecating.  Also notes is associated with a full bladder.  Already takes a Colace every other day which helps bowels move better. -->>  Be sure to discuss the shortness of breath with your heart doctor and your lung doctor next time you see them.  Recommend you  MiraLAX/generic is fine.  Try doing half a dose every day.  It is dissolved in water or other beverage.  You can then adjust up or down a little bit depending on how that is doing for you, but the main recommendation is to do the same thing every day for a week or 2 and see how that does.  If it still not helping enough and the lung doctor does not have any good suggestions, we could always do a referral to gastroenterology for further evaluation.     Coronary artery disease, implanted defibrillator, on Plavix, history of angina, CHF, intermittent claudication, peripheral vascular disease. He does see Rhianna Suarez and Michel/Bridger. In 2019 had 3 cardiac stents put in. Patient does notice shortness of breath has improved at least a little.  In particular patient notes poor circulation in the hands.  Hands getting cold and numb more frequently than they used to. -->> follow-up with cardiology as planned.  They patient is seeing cardiology next month.  Be sure to mention you have been getting your hands have been getting cold and numb worse than they usually do.     Phantom limb pain, also chronic pain, is on gabapentin 600 to 1200 mg 3 times daily.  Doing well enough on that dose.  Was told he needed to come in for a refill. -->> Sent refills for at least a year today.    Smokes, improving, not smoking every day, averaging may be 1 or 2  cigarettes a day, improved from 4 cigarettes a day.  Down from 2 packs a day. -->>  Keep up the excellent work.  Of course I do advise you to totally quit.      Return in a few months as already scheduled.

## 2023-12-13 NOTE — PATIENT INSTRUCTIONS
Continue same medications/treatment.  Patient educated on proper medication use.  Patient educated on risk factor modification.  Please bring any lab results from other providers/physicians to your next appointment.    Please bring all medicines, vitamins, and herbal supplements with you when you come to the office.    Prescriptions will not be filled unless you are compliant with your follow up appointments or have a follow up appointment scheduled as per instruction of your physician. Refills should be requested at the time of your visit.    Follow up with Dr. Suarez this week or next week   Follow up with Beth in 6-8 weeks with device check  Continue remote checks at 3 and 9 months    LV GOMEZ RN, AM SCRIBING FOR, AND IN THE PRESENCE OF DR. SANTOS VEGAS MD

## 2023-12-13 NOTE — PROGRESS NOTES
CARDIOLOGY OFFICE VISIT      CHIEF COMPLAINT  Chief Complaint   Patient presents with    Follow-up     Patient presented today for a 6 month follow up.         HISTORY OF PRESENT ILLNESS  HPI     73-year-old -American male who is followed for ischemic cardiomyopathy with a left ventricular ejection fraction of 30% per 2D echocardiogram dated July 31, 2020, New York Heart Association class II, stage C heart failure, coronary artery disease status post multivessel angioplasty with stent deployment. He underwent implantation of a dual-chamber ICD on February 18, 2020 for primary prevention of sudden cardiac death and presents to the office today for follow-up evaluation.    Patient states that he was doing well until 4 to 6 weeks ago when he started noticing getting IV more tired and fatigued doing his daily activities.  He is blaming these to his COPD exacerbation.    Patient had a CT of the chest ordered by Dr. Guillaume.  The results showed  IMPRESSION:  1. New 6 mm right apical nodular density with somewhat oblique  orientation as compared to prior studies, favored to be on the basis  of scar. Given its interval development, advise low-dose CT follow-up  in 6 months to reassess in this patient with underlying  smoking-related lung disease.  2. Atherosclerotic disease with densely calcified coronary arteries.  If not recently performed, consider correlation with a nuclear  medicine stress test to exclude a severe coronary artery stenosis.      Patient is very concerned about significant calcification of the coronary arteries.    Patient had an echocardiogram in August 2023 that shows left ventricular action fraction of 30 to 35% with 2-3+ tricuspid regurgitation and mild to moderate mitral regurgitation.    Device interrogation September 2023 shows battery longevity 11 years.  There was 1 episode of ventricular tachycardia that happened on September 9, 2023 at 11:32 PM.  Ventricular tachycardia rate was 200 bpm  and terminated with ATP.  No ICD shock was delivered.    Device interrogation performed today at the device clinic shows battery longevity 10 years.  No evidence of atrial fibrillation or ventricular arrhythmias.    EKG performed today shows atrial paced rhythm at rate of 60 bpm QRS ration 70 ms QT corrected 452 ms.  Rhythm strip shows the same pattern.          Past Medical History  Past Medical History:   Diagnosis Date    Anesthesia of skin     Numbness    Chronic obstructive pulmonary disease with (acute) exacerbation (CMS/[facility]) 12/08/2022    Acute exacerbation of chronic obstructive pulmonary disease (COPD)    Cough, unspecified     Cough    Disorder of prostate, unspecified     Prostate disease    Encounter for general adult medical examination without abnormal findings 12/09/2021    Encounter for Medicare annual wellness exam    Encounter for immunization 10/16/2020    Encounter for immunization    Functional dyspepsia     Indigestion    Local infection of the skin and subcutaneous tissue, unspecified 09/08/2020    Staph skin infection    Mixed hyperlipidemia 12/09/2021    Mixed hyperlipidemia    Muscle weakness (generalized)     Localized muscle weakness    Nicotine dependence, cigarettes, uncomplicated 10/16/2020    Heavy cigarette smoker    Other iron deficiency anemias 10/11/2021    Hypochromic erythrocytes    Other spondylosis, cervical region 03/16/2022    Other spondylosis, cervical region    Pain in leg, unspecified 03/16/2022    Leg pain    Personal history of other diseases of the circulatory system     History of cardiac disorder    Personal history of other diseases of the circulatory system     History of hypertension    Personal history of other diseases of the circulatory system 12/09/2021    History of class IV angina pectoris    Personal history of other diseases of the circulatory system 03/16/2022    History of vascular disorder    Personal history of other diseases of the musculoskeletal  system and connective tissue     History of arthritis    Personal history of other diseases of the musculoskeletal system and connective tissue 03/16/2022    History of low back pain    Personal history of other diseases of the musculoskeletal system and connective tissue 03/16/2022    History of neck pain    Personal history of other specified conditions     History of nausea    Personal history of other specified conditions     History of chest pain    Personal history of other specified conditions     History of heartburn    Postnasal drip     Post-nasal drip    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     Allergies:  Allergies   Allergen Reactions    Morphine Itching    Simvastatin Itching        Outpatient Medications:  Current Outpatient Medications   Medication Instructions    albuterol 2.5 mg /3 mL (0.083 %) nebulizer solution USE 1 UNIT DOSE EVERY 4-6 HOURS AS NEEDED FOR WHEEZING .    albuterol 90 mcg/actuation inhaler Inhale 2 puffs by mouth up to every 4 hours as needed for shortness of breath, wheezing, or prior to exercise.    amLODIPine (NORVASC) 5 mg, oral, Daily    aspirin 81 mg chewable tablet 1 tablet, oral, Daily    atorvastatin (LIPITOR) 40 mg, oral, Nightly    cholecalciferol (Vitamin D-3) 25 MCG (1000 UT) tablet TAKE AS DIRECTED.    clopidogrel (PLAVIX) 75 mg, oral, Daily    furosemide (LASIX) 20 mg, oral, Daily    gabapentin (Neurontin) 300 mg capsule take 2-4 capsules BY MOUTH THREE TIMES DAILY AS DIRECTED    lisinopril 5 mg, oral, Daily    methadone (DOLOPHINE) 5 mg, oral, Every 6 hours    methadone (DOLOPHINE) 5 mg,  oral, Every 6 hours PRN    [START ON 1/12/2024] methadone (DOLOPHINE) 5 mg, oral, Every 6 hours PRN    [START ON 2/11/2024] methadone (DOLOPHINE) 5 mg, oral, Every 6 hours PRN    metoprolol succinate XL (TOPROL-XL) 50 mg, oral, Every evening    naloxone (Narcan) 4 mg/0.1 mL nasal spray USE 1 SPRAY IN ONE NOSTRILONCE AS NEEDED FOR OVERDOSE SYMPTOMS. MAY REPEAT DOSE IF NEEDED IN 2-3 MINUTES IN YOUR OTHER NOSTRIL.    nitroglycerin (Nitrostat) 0.4 mg SL tablet DISSOLVE 1 TABLET UNDER THE TONGUE AS NEEDED FOR CHEST PAIN- MAY REPEA    pantoprazole (ProtoNix) 40 mg EC tablet TAKE ONE TABLET BY MOUTH DAILY    sildenafil (Viagra) 100 mg tablet TAKE 1/2 to 1 whole TABLET 1 HOUR BEFORE NEEDED          REVIEW OF SYSTEMS  Review of Systems   All other systems reviewed and are negative.        VITALS  Vitals:    12/13/23 1145   BP: 150/80   Pulse: 60       PHYSICAL EXAM  Constitutional:       General: Awake.      Appearance: Normal and healthy appearance. Well-developed and not in distress.   Neck:      Vascular: No JVR. JVD normal.   Pulmonary:      Effort: Pulmonary effort is normal.      Breath sounds: Normal breath sounds. No wheezing. No rhonchi. No rales.   Chest:      Chest wall: Not tender to palpatation.      Comments: Left sided device pocket- healed and well approximated. No lump or hematoma     Cardiovascular:      PMI at left midclavicular line. Normal rate. Regular rhythm. Normal S1. Normal S2.       Murmurs: There is no murmur.      No gallop.  No click. No rub.   Pulses:     Intact distal pulses.   Edema:     Peripheral edema absent.   Abdominal:      Tenderness: There is no abdominal tenderness.   Musculoskeletal: Normal range of motion.         General: No tenderness.      Right Lower Extremity: Right leg is amputated above knee. Skin:     General: Skin is warm and dry.   Neurological:      General: No focal deficit present.      Mental Status: Alert and oriented to person, place and time.           ASSESSMENT  AND PLAN    Clinical impressions:  1. Ischemic cardiomyopathy with left ventricular ejection fraction of 30%, New York Heart Association class II, stage C heart failure.  2. Coronary artery disease status post multivessel angioplasty with stent deployment with left heart catheterization dated January 16, 2019 revealing 50% distal left main, 50% mid LAD, 90% mid and distal RCA status post PTCA with drug-eluting stent, patent circumflex stents, and in-stent restenosis of the mid and distal RCA.  3. Dual-chamber ICD implant (Dynadec timi RESENDEZ) on February 10, 2020 for primary prevention of sudden cardiac death.  4. Sinus node dysfunction status post dual-chamber ICD as above.  5. Dyslipidemia on statin.  6. Hypertension, controlled  7. Status post right above-the-knee amputation for gangrene.  8. COPD with ongoing tobacco use.  9. Underweight with BMI of 19.14.  10. Tobacco dependence.  11.  Ventricular tachycardia seen on device interrogation in September 2023.  Episode terminated with ATP.  No ICD shock.    Plan recommendations    I had a lengthy discussion with patient regarding findings of the device interrogation.  Patient started noticing shortness of breath and also device interrogation did show an episode of sustained ventricular tachycardia in September 9023 at 11:32 PM.  Ventricular tachycardia terminated with ATP.  Patient will be evaluated by cardiology service for possible cardiac catheterization in the near future.  Procedure, risk, benefits and possible complications were explained to patient.  All questions were answered.  Patient agrees with plan.      Follow device clinic as scheduled.    Follow my office in 4 to 6 weeks or sooner needed to evaluate recurrence of ventricular arrhythmias.    If there is no significant lesion by cardiac catheterization that can explain ventricular tachycardia, we may have to consider intermittent therapy if he has recurrence of this arrhythmia.    Risk  factor modification and lifestyle modification discussed with patient. Diet , exercise and hydration discussed with patient.    I have personally review with patient during this office visit, laboratory data, echocardiogram results, stress test results, Holter-event monitor results prior and after the last electrophysiology visit. All questions has been answered.    Please excuse any errors in grammar or translation related to this dictation.  Voice recognition software was utilized to prepare this document.

## 2023-12-14 PROBLEM — I25.10 CAD (CORONARY ARTERY DISEASE): Status: RESOLVED | Noted: 2023-01-01 | Resolved: 2023-01-01

## 2023-12-14 PROBLEM — T82.198A INAPPROPRIATE SHOCKS FROM ICD (IMPLANTABLE CARDIOVERTER-DEFIBRILLATOR): Status: ACTIVE | Noted: 2023-01-01

## 2023-12-14 NOTE — PATIENT INSTRUCTIONS
Patient to follow up after procedure.     Will plan heart cath with possible intervention with Dr. Zen Suarez MD [facility] in near future.   Not urgent, can wait until after Holidays and provider time off.   Please complete lab work prior to procedure.   Please take all of your morning medications, including Aspirin and Plavix, with a sip of water that morning. This is important.     Office will also arrange Carotid Ultrasound in near future for you as well.     No other changes today.     Encouraged to quit smoking- heart healthy education given today.     Continue same medications and treatments.   Patient educated on proper medication use.   Patient educated on risk factor modification.   Please bring any lab results from other providers / physicians to your next appointment.     Please bring all medicines, vitamins, and herbal supplements with you when you come to the office.     Prescriptions will not be filled unless you are compliant with your follow up appointments or have a follow up appointment scheduled as per instruction of your physician. Refills should be requested at the time of your visit.    Matt GOMEZ RN am scribing for and in the presence of Dr. Zen Suarez MD [facility]

## 2023-12-14 NOTE — PROGRESS NOTES
Referred by Dr. Meier ref. provider found provider found for No chief complaint on file.       History of Present Illness  Luis Peacock Jr. is a 73 y.o. year old male patient with history of ischemic cardiomyopathy had ICD implantation.  He had amputation left lower extremity.  Apparently had recurrent ventricular arrhythmia on the ICD check.  He had 1 shock according to Dr. Pearson but the patient did not really feel it.  They are concerned about potential ischemic event.  I discussed with the patient in length the fact he has shock most likely ventricular arrhythmia we will go ahead with coronary angiography to rule out ischemic event.  Based on that further recommendation will be made    Past Medical History  Past Medical History:   Diagnosis Date    Anesthesia of skin     Numbness    Chronic obstructive pulmonary disease with (acute) exacerbation (CMS/[facility]) 12/08/2022    Acute exacerbation of chronic obstructive pulmonary disease (COPD)    Cough, unspecified     Cough    Disorder of prostate, unspecified     Prostate disease    Encounter for general adult medical examination without abnormal findings 12/09/2021    Encounter for Medicare annual wellness exam    Encounter for immunization 10/16/2020    Encounter for immunization    Functional dyspepsia     Indigestion    Local infection of the skin and subcutaneous tissue, unspecified 09/08/2020    Staph skin infection    Mixed hyperlipidemia 12/09/2021    Mixed hyperlipidemia    Muscle weakness (generalized)     Localized muscle weakness    Nicotine dependence, cigarettes, uncomplicated 10/16/2020    Heavy cigarette smoker    Other iron deficiency anemias 10/11/2021    Hypochromic erythrocytes    Other spondylosis, cervical region 03/16/2022    Other spondylosis, cervical region    Pain in leg, unspecified 03/16/2022    Leg pain    Personal history of other diseases of the circulatory system     History of cardiac disorder    Personal history of other diseases of the  circulatory system     History of hypertension    Personal history of other diseases of the circulatory system 12/09/2021    History of class IV angina pectoris    Personal history of other diseases of the circulatory system 03/16/2022    History of vascular disorder    Personal history of other diseases of the musculoskeletal system and connective tissue     History of arthritis    Personal history of other diseases of the musculoskeletal system and connective tissue 03/16/2022    History of low back pain    Personal history of other diseases of the musculoskeletal system and connective tissue 03/16/2022    History of neck pain    Personal history of other specified conditions     History of nausea    Personal history of other specified conditions     History of chest pain    Personal history of other specified conditions     History of heartburn    Postnasal drip     Post-nasal drip    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    Review of Systems  As per HPI, all other systems reviewed and negative.    Allergies:  Allergies   Allergen Reactions    Morphine Itching    Simvastatin Itching        Outpatient Medications:  Current Outpatient Medications   Medication Instructions    albuterol 2.5 mg /3 mL (0.083 %) nebulizer solution USE 1 UNIT DOSE EVERY 4-6 HOURS AS NEEDED FOR WHEEZING .    albuterol 90 mcg/actuation inhaler Inhale 2 puffs by mouth up to every 4 hours as needed for shortness of breath, wheezing, or prior to exercise.    amLODIPine (NORVASC) 5 mg, oral, Daily    aspirin 81 mg chewable tablet 1 tablet, oral, Daily    atorvastatin  (LIPITOR) 40 mg, oral, Nightly    cholecalciferol (Vitamin D-3) 25 MCG (1000 UT) tablet TAKE AS DIRECTED.    clopidogrel (PLAVIX) 75 mg, oral, Daily    furosemide (LASIX) 20 mg, oral, Daily    gabapentin (Neurontin) 300 mg capsule take 2-4 capsules BY MOUTH THREE TIMES DAILY AS DIRECTED    lisinopril 5 mg, oral, Daily    methadone (DOLOPHINE) 5 mg, oral, Every 6 hours    methadone (DOLOPHINE) 5 mg, oral, Every 6 hours PRN    [START ON 1/12/2024] methadone (DOLOPHINE) 5 mg, oral, Every 6 hours PRN    [START ON 2/11/2024] methadone (DOLOPHINE) 5 mg, oral, Every 6 hours PRN    metoprolol succinate XL (TOPROL-XL) 50 mg, oral, Every evening    naloxone (Narcan) 4 mg/0.1 mL nasal spray USE 1 SPRAY IN ONE NOSTRILONCE AS NEEDED FOR OVERDOSE SYMPTOMS. MAY REPEAT DOSE IF NEEDED IN 2-3 MINUTES IN YOUR OTHER NOSTRIL.    nitroglycerin (Nitrostat) 0.4 mg SL tablet DISSOLVE 1 TABLET UNDER THE TONGUE AS NEEDED FOR CHEST PAIN- MAY REPEA    pantoprazole (ProtoNix) 40 mg EC tablet TAKE ONE TABLET BY MOUTH DAILY    sildenafil (Viagra) 100 mg tablet TAKE 1/2 to 1 whole TABLET 1 HOUR BEFORE NEEDED         Vitals:  There were no vitals filed for this visit.    Physical Exam:  Physical Exam  Constitutional:       Appearance: Normal appearance.   HENT:      Head: Normocephalic and atraumatic.   Eyes:      Extraocular Movements: Extraocular movements intact.      Pupils: Pupils are equal, round, and reactive to light.   Cardiovascular:      Rate and Rhythm: Normal rate and regular rhythm.      Pulses: Normal pulses.      Heart sounds: Normal heart sounds.   Pulmonary:      Effort: Pulmonary effort is normal.      Breath sounds: Normal breath sounds.   Abdominal:      General: Abdomen is flat.      Palpations: Abdomen is soft.   Musculoskeletal:      Right lower leg: No edema.      Left lower leg: No edema.   Skin:     General: Skin is warm and dry.             Comments: S/p AKA    Neurological:      General: No focal deficit present.       Mental Status: He is alert and oriented to person, place, and time.             Assessment/Plan   Diagnoses and all orders for this visit:  CAD S/P percutaneous coronary angioplasty  Benign essential hypertension  Chronic systolic congestive heart failure, NYHA class 2 (CMS/[facility])  Coronary artery disease involving native coronary artery of native heart without angina pectoris  Mixed hyperlipidemia  Intermittent claudication (CMS/[facility])  Ischemic cardiomyopathy  Presence of double chamber automatic cardioverter/defibrillator (AICD)  PVD (peripheral vascular disease) (CMS/[facility])  S/P AKA (above knee amputation) unilateral (CMS/[facility])  Chronic obstructive pulmonary disease, unspecified COPD type (CMS/[facility])  Current every day smoker  Underweight  Inappropriate discharge of implantable cardioverter-defibrillator (ICD), subsequent encounter          Zen Suarez MD [facility]  Interventional Cardiology   of [facility]     Thank you for allowing me to participate in the care of this patient. Please do not hesitate to contact me with any further questions or concerns.

## 2024-01-01 ENCOUNTER — CLINICAL SUPPORT (OUTPATIENT)
Dept: PRIMARY CARE | Facility: CLINIC | Age: 74
End: 2024-01-01
Payer: MEDICARE

## 2024-01-01 ENCOUNTER — APPOINTMENT (OUTPATIENT)
Dept: GASTROENTEROLOGY | Facility: HOSPITAL | Age: 74
DRG: 207 | End: 2024-01-01
Payer: MEDICARE

## 2024-01-01 ENCOUNTER — APPOINTMENT (OUTPATIENT)
Dept: RADIOLOGY | Facility: HOSPITAL | Age: 74
DRG: 207 | End: 2024-01-01
Payer: MEDICARE

## 2024-01-01 ENCOUNTER — HOSPITAL ENCOUNTER (OUTPATIENT)
Dept: CARDIOLOGY | Facility: HOSPITAL | Age: 74
Discharge: HOME | End: 2024-02-01
Payer: MEDICARE

## 2024-01-01 ENCOUNTER — APPOINTMENT (OUTPATIENT)
Dept: PRIMARY CARE | Facility: CLINIC | Age: 74
End: 2024-01-01
Payer: MEDICARE

## 2024-01-01 ENCOUNTER — APPOINTMENT (OUTPATIENT)
Dept: CARDIOLOGY | Facility: HOSPITAL | Age: 74
DRG: 207 | End: 2024-01-01
Payer: MEDICARE

## 2024-01-01 ENCOUNTER — HOSPITAL ENCOUNTER (INPATIENT)
Facility: HOSPITAL | Age: 74
LOS: 5 days | DRG: 189 | End: 2024-03-18
Attending: INTERNAL MEDICINE | Admitting: STUDENT IN AN ORGANIZED HEALTH CARE EDUCATION/TRAINING PROGRAM
Payer: OTHER MISCELLANEOUS

## 2024-01-01 ENCOUNTER — HOSPITAL ENCOUNTER (OUTPATIENT)
Dept: CARDIOLOGY | Facility: CLINIC | Age: 74
Discharge: HOME | End: 2024-01-02
Payer: MEDICARE

## 2024-01-01 ENCOUNTER — HOSPITAL ENCOUNTER (OUTPATIENT)
Facility: HOSPITAL | Age: 74
Setting detail: OUTPATIENT SURGERY
Discharge: HOME | End: 2024-01-15
Attending: INTERNAL MEDICINE | Admitting: INTERNAL MEDICINE
Payer: MEDICARE

## 2024-01-01 ENCOUNTER — HOSPITAL ENCOUNTER (INPATIENT)
Facility: HOSPITAL | Age: 74
LOS: 10 days | Discharge: HOSPICE | DRG: 207 | End: 2024-03-13
Attending: STUDENT IN AN ORGANIZED HEALTH CARE EDUCATION/TRAINING PROGRAM | Admitting: STUDENT IN AN ORGANIZED HEALTH CARE EDUCATION/TRAINING PROGRAM
Payer: MEDICARE

## 2024-01-01 ENCOUNTER — OFFICE VISIT (OUTPATIENT)
Dept: CARDIOLOGY | Facility: CLINIC | Age: 74
End: 2024-01-01
Payer: MEDICARE

## 2024-01-01 ENCOUNTER — APPOINTMENT (OUTPATIENT)
Dept: CARDIOLOGY | Facility: HOSPITAL | Age: 74
End: 2024-01-01
Payer: MEDICARE

## 2024-01-01 VITALS
BODY MASS INDEX: 15.77 KG/M2 | HEIGHT: 64 IN | RESPIRATION RATE: 24 BRPM | TEMPERATURE: 99.3 F | HEART RATE: 125 BPM | OXYGEN SATURATION: 99 % | WEIGHT: 92.37 LBS | SYSTOLIC BLOOD PRESSURE: 176 MMHG | DIASTOLIC BLOOD PRESSURE: 95 MMHG

## 2024-01-01 VITALS
TEMPERATURE: 100.2 F | SYSTOLIC BLOOD PRESSURE: 73 MMHG | HEART RATE: 106 BPM | OXYGEN SATURATION: 92 % | RESPIRATION RATE: 12 BRPM | DIASTOLIC BLOOD PRESSURE: 48 MMHG

## 2024-01-01 VITALS
HEIGHT: 65 IN | SYSTOLIC BLOOD PRESSURE: 125 MMHG | HEART RATE: 68 BPM | RESPIRATION RATE: 18 BRPM | DIASTOLIC BLOOD PRESSURE: 87 MMHG | WEIGHT: 95.46 LBS | TEMPERATURE: 97.3 F | OXYGEN SATURATION: 93 % | BODY MASS INDEX: 15.9 KG/M2

## 2024-01-01 VITALS
HEART RATE: 73 BPM | HEIGHT: 65 IN | DIASTOLIC BLOOD PRESSURE: 78 MMHG | SYSTOLIC BLOOD PRESSURE: 130 MMHG | BODY MASS INDEX: 15.83 KG/M2 | WEIGHT: 95 LBS

## 2024-01-01 DIAGNOSIS — I73.9 PVD (PERIPHERAL VASCULAR DISEASE) (CMS-HCC): ICD-10-CM

## 2024-01-01 DIAGNOSIS — J44.1 COPD EXACERBATION (MULTI): ICD-10-CM

## 2024-01-01 DIAGNOSIS — Z12.5 SCREENING FOR PROSTATE CANCER: ICD-10-CM

## 2024-01-01 DIAGNOSIS — J21.0 RSV (ACUTE BRONCHIOLITIS DUE TO RESPIRATORY SYNCYTIAL VIRUS): ICD-10-CM

## 2024-01-01 DIAGNOSIS — T82.198D INAPPROPRIATE DISCHARGE OF IMPLANTABLE CARDIOVERTER-DEFIBRILLATOR (ICD), SUBSEQUENT ENCOUNTER: ICD-10-CM

## 2024-01-01 DIAGNOSIS — I25.10 CORONARY ARTERY DISEASE INVOLVING NATIVE CORONARY ARTERY OF NATIVE HEART WITHOUT ANGINA PECTORIS: ICD-10-CM

## 2024-01-01 DIAGNOSIS — Z89.619 S/P AKA (ABOVE KNEE AMPUTATION) UNILATERAL (MULTI): ICD-10-CM

## 2024-01-01 DIAGNOSIS — I10 BENIGN ESSENTIAL HYPERTENSION: ICD-10-CM

## 2024-01-01 DIAGNOSIS — Z95.810 PRESENCE OF DOUBLE CHAMBER AUTOMATIC CARDIOVERTER/DEFIBRILLATOR (AICD): ICD-10-CM

## 2024-01-01 DIAGNOSIS — E55.9 VITAMIN D DEFICIENCY: ICD-10-CM

## 2024-01-01 DIAGNOSIS — Z95.810 PRESENCE OF DOUBLE CHAMBER AUTOMATIC CARDIOVERTER/DEFIBRILLATOR (AICD): Primary | ICD-10-CM

## 2024-01-01 DIAGNOSIS — I25.10 CAD S/P PERCUTANEOUS CORONARY ANGIOPLASTY: Primary | ICD-10-CM

## 2024-01-01 DIAGNOSIS — I73.9 INTERMITTENT CLAUDICATION (CMS-HCC): ICD-10-CM

## 2024-01-01 DIAGNOSIS — Z00.00 PREVENTATIVE HEALTH CARE: ICD-10-CM

## 2024-01-01 DIAGNOSIS — Z98.61 CAD S/P PERCUTANEOUS CORONARY ANGIOPLASTY: Primary | ICD-10-CM

## 2024-01-01 DIAGNOSIS — J96.01 ACUTE HYPOXIC RESPIRATORY FAILURE (MULTI): ICD-10-CM

## 2024-01-01 DIAGNOSIS — Z13.9 SCREENING FOR CONDITION: ICD-10-CM

## 2024-01-01 DIAGNOSIS — G54.6 PHANTOM PAIN AFTER AMPUTATION OF LOWER EXTREMITY (MULTI): ICD-10-CM

## 2024-01-01 DIAGNOSIS — I49.9 CARDIAC ARRHYTHMIA, UNSPECIFIED: ICD-10-CM

## 2024-01-01 DIAGNOSIS — Z79.899 DRUG THERAPY: ICD-10-CM

## 2024-01-01 DIAGNOSIS — F17.200 CURRENT EVERY DAY SMOKER: ICD-10-CM

## 2024-01-01 DIAGNOSIS — I50.22 CHRONIC SYSTOLIC CONGESTIVE HEART FAILURE, NYHA CLASS 2 (MULTI): ICD-10-CM

## 2024-01-01 DIAGNOSIS — I25.5 ISCHEMIC CARDIOMYOPATHY: ICD-10-CM

## 2024-01-01 DIAGNOSIS — E78.2 MIXED HYPERLIPIDEMIA: ICD-10-CM

## 2024-01-01 DIAGNOSIS — I25.10 CAD S/P PERCUTANEOUS CORONARY ANGIOPLASTY: ICD-10-CM

## 2024-01-01 DIAGNOSIS — Z98.61 CAD S/P PERCUTANEOUS CORONARY ANGIOPLASTY: ICD-10-CM

## 2024-01-01 DIAGNOSIS — I21.4 NSTEMI (NON-ST ELEVATED MYOCARDIAL INFARCTION) (MULTI): ICD-10-CM

## 2024-01-01 DIAGNOSIS — J44.9 CHRONIC OBSTRUCTIVE PULMONARY DISEASE, UNSPECIFIED COPD TYPE (MULTI): Primary | ICD-10-CM

## 2024-01-01 DIAGNOSIS — K92.2 GASTROINTESTINAL HEMORRHAGE, UNSPECIFIED GASTROINTESTINAL HEMORRHAGE TYPE: ICD-10-CM

## 2024-01-01 DIAGNOSIS — R20.0 ARM NUMBNESS LEFT: ICD-10-CM

## 2024-01-01 LAB
25(OH)D3 SERPL-MCNC: 27 NG/ML (ref 30–100)
ABO GROUP (TYPE) IN BLOOD: NORMAL
ABO GROUP (TYPE) IN BLOOD: NORMAL
ADENOVIRUS RVP, VIRC: NOT DETECTED
ALBUMIN SERPL BCP-MCNC: 3.4 G/DL (ref 3.4–5)
ALBUMIN SERPL BCP-MCNC: 3.5 G/DL (ref 3.4–5)
ALBUMIN SERPL BCP-MCNC: 4.6 G/DL (ref 3.4–5)
ALBUMIN SERPL BCP-MCNC: 4.8 G/DL (ref 3.4–5)
ALP SERPL-CCNC: 35 U/L (ref 33–136)
ALP SERPL-CCNC: 38 U/L (ref 33–136)
ALP SERPL-CCNC: 38 U/L (ref 33–136)
ALP SERPL-CCNC: 39 U/L (ref 33–136)
ALP SERPL-CCNC: 57 U/L (ref 33–136)
ALP SERPL-CCNC: 59 U/L (ref 33–136)
ALT SERPL W P-5'-P-CCNC: 11 U/L (ref 10–52)
ALT SERPL W P-5'-P-CCNC: 11 U/L (ref 10–52)
ALT SERPL W P-5'-P-CCNC: 16 U/L (ref 10–52)
ALT SERPL W P-5'-P-CCNC: 17 U/L (ref 10–52)
ALT SERPL W P-5'-P-CCNC: 19 U/L (ref 10–52)
ALT SERPL W P-5'-P-CCNC: 23 U/L (ref 10–52)
AMMONIA PLAS-SCNC: 49 UMOL/L (ref 16–53)
ANION GAP BLDA CALCULATED.4IONS-SCNC: -6 MMO/L (ref 10–25)
ANION GAP BLDA CALCULATED.4IONS-SCNC: 4 MMO/L (ref 10–25)
ANION GAP BLDA CALCULATED.4IONS-SCNC: 9 MMO/L (ref 10–25)
ANION GAP BLDA CALCULATED.4IONS-SCNC: ABNORMAL MMOL/L
ANION GAP SERPL CALC-SCNC: 10 MMOL/L (ref 10–20)
ANION GAP SERPL CALC-SCNC: 10 MMOL/L (ref 10–20)
ANION GAP SERPL CALC-SCNC: 11 MMOL/L (ref 10–20)
ANION GAP SERPL CALC-SCNC: 12 MMOL/L (ref 10–20)
ANION GAP SERPL CALC-SCNC: 14 MMOL/L (ref 10–20)
ANION GAP SERPL CALC-SCNC: 7 MMOL/L (ref 10–20)
ANION GAP SERPL CALC-SCNC: 8 MMOL/L (ref 10–20)
ANION GAP SERPL CALC-SCNC: 8 MMOL/L (ref 10–20)
ANION GAP SERPL CALC-SCNC: 9 MMOL/L (ref 10–20)
ANION GAP SERPL CALC-SCNC: <7 MMOL/L (ref 10–20)
ANTIBODY SCREEN: NORMAL
APPARATUS: ABNORMAL
APPEARANCE UR: ABNORMAL
APTT PPP: 151 SECONDS (ref 27–38)
APTT PPP: 160 SECONDS (ref 27–38)
APTT PPP: 29 SECONDS (ref 27–38)
ARTERIAL PATENCY WRIST A: POSITIVE
AST SERPL W P-5'-P-CCNC: 15 U/L (ref 9–39)
AST SERPL W P-5'-P-CCNC: 21 U/L (ref 9–39)
AST SERPL W P-5'-P-CCNC: 25 U/L (ref 9–39)
AST SERPL W P-5'-P-CCNC: 28 U/L (ref 9–39)
AST SERPL W P-5'-P-CCNC: 31 U/L (ref 9–39)
AST SERPL W P-5'-P-CCNC: 41 U/L (ref 9–39)
ATRIAL RATE: 60 BPM
ATRIAL RATE: 74 BPM
ATRIAL RATE: 79 BPM
ATRIAL RATE: 82 BPM
ATRIAL RATE: 84 BPM
BACTERIA #/AREA URNS AUTO: ABNORMAL /HPF
BACTERIA BLD CULT: NORMAL
BASE EXCESS BLDA CALC-SCNC: -1.2 MMOL/L (ref -2–3)
BASE EXCESS BLDA CALC-SCNC: -5.8 MMOL/L (ref -2–3)
BASE EXCESS BLDA CALC-SCNC: 1.7 MMOL/L (ref -2–3)
BASE EXCESS BLDA CALC-SCNC: 12.3 MMOL/L (ref -2–3)
BASE EXCESS BLDA CALC-SCNC: 20.7 MMOL/L (ref -2–3)
BASE EXCESS BLDA CALC-SCNC: 3.1 MMOL/L (ref -2–3)
BASE EXCESS BLDA CALC-SCNC: 3.4 MMOL/L (ref -2–3)
BASE EXCESS BLDA CALC-SCNC: 3.6 MMOL/L (ref -2–3)
BASE EXCESS BLDA CALC-SCNC: 3.7 MMOL/L (ref -2–3)
BASE EXCESS BLDA CALC-SCNC: 4.4 MMOL/L (ref -2–3)
BASE EXCESS BLDA CALC-SCNC: 6.7 MMOL/L (ref -2–3)
BASE EXCESS BLDA CALC-SCNC: 7 MMOL/L (ref -2–3)
BASE EXCESS BLDA CALC-SCNC: 7.8 MMOL/L (ref -2–3)
BASE EXCESS BLDA CALC-SCNC: 8.4 MMOL/L (ref -2–3)
BASE EXCESS BLDA CALC-SCNC: 9.8 MMOL/L (ref -2–3)
BASOPHILS # BLD AUTO: 0 X10*3/UL (ref 0–0.1)
BASOPHILS # BLD AUTO: 0.01 X10*3/UL (ref 0–0.1)
BASOPHILS # BLD AUTO: 0.02 X10*3/UL (ref 0–0.1)
BASOPHILS # BLD AUTO: 0.03 X10*3/UL (ref 0–0.1)
BASOPHILS # BLD AUTO: 0.04 X10*3/UL (ref 0–0.1)
BASOPHILS # BLD AUTO: 0.04 X10*3/UL (ref 0–0.1)
BASOPHILS NFR BLD AUTO: 0 %
BASOPHILS NFR BLD AUTO: 0 %
BASOPHILS NFR BLD AUTO: 0.1 %
BASOPHILS NFR BLD AUTO: 0.2 %
BASOPHILS NFR BLD AUTO: 0.4 %
BASOPHILS NFR BLD AUTO: 0.9 %
BILIRUB SERPL-MCNC: 0.4 MG/DL (ref 0–1.2)
BILIRUB SERPL-MCNC: 0.5 MG/DL (ref 0–1.2)
BILIRUB SERPL-MCNC: 0.9 MG/DL (ref 0–1.2)
BILIRUB UR STRIP.AUTO-MCNC: NEGATIVE MG/DL
BLOOD EXPIRATION DATE: NORMAL
BNP SERPL-MCNC: 376 PG/ML (ref 0–99)
BODY TEMPERATURE: 37 DEGREES CELSIUS
BODY TEMPERATURE: ABNORMAL
BUN SERPL-MCNC: 10 MG/DL (ref 6–23)
BUN SERPL-MCNC: 12 MG/DL (ref 6–23)
BUN SERPL-MCNC: 16 MG/DL (ref 6–23)
BUN SERPL-MCNC: 20 MG/DL (ref 6–23)
BUN SERPL-MCNC: 22 MG/DL (ref 6–23)
BUN SERPL-MCNC: 27 MG/DL (ref 6–23)
BUN SERPL-MCNC: 31 MG/DL (ref 6–23)
BUN SERPL-MCNC: 32 MG/DL (ref 6–23)
BUN SERPL-MCNC: 40 MG/DL (ref 6–23)
BUN SERPL-MCNC: 40 MG/DL (ref 6–23)
BUN SERPL-MCNC: 44 MG/DL (ref 6–23)
BUN SERPL-MCNC: 9 MG/DL (ref 6–23)
CA-I BLDA-SCNC: 0.77 MMOL/L (ref 1.1–1.33)
CA-I BLDA-SCNC: 1.07 MMOL/L (ref 1.1–1.33)
CA-I BLDA-SCNC: 1.17 MMOL/L (ref 1.1–1.33)
CA-I BLDA-SCNC: 1.17 MMOL/L (ref 1.1–1.33)
CA-I BLDA-SCNC: 1.18 MMOL/L (ref 1.1–1.33)
CA-I BLDA-SCNC: 1.21 MMOL/L (ref 1.1–1.33)
CA-I BLDA-SCNC: 1.24 MMOL/L (ref 1.1–1.33)
CA-I BLDA-SCNC: 1.25 MMOL/L (ref 1.1–1.33)
CA-I BLDA-SCNC: 1.25 MMOL/L (ref 1.1–1.33)
CA-I BLDA-SCNC: 1.28 MMOL/L (ref 1.1–1.33)
CA-I BLDA-SCNC: 1.28 MMOL/L (ref 1.1–1.33)
CA-I BLDA-SCNC: 1.31 MMOL/L (ref 1.1–1.33)
CA-I BLDA-SCNC: 1.41 MMOL/L (ref 1.1–1.33)
CA-I BLDA-SCNC: 1.59 MMOL/L (ref 1.1–1.33)
CA-I BLDA-SCNC: 1.73 MMOL/L (ref 1.1–1.33)
CALCIUM SERPL-MCNC: 10.2 MG/DL (ref 8.6–10.3)
CALCIUM SERPL-MCNC: 12.1 MG/DL (ref 8.6–10.3)
CALCIUM SERPL-MCNC: 7.9 MG/DL (ref 8.6–10.3)
CALCIUM SERPL-MCNC: 8.8 MG/DL (ref 8.6–10.3)
CALCIUM SERPL-MCNC: 9 MG/DL (ref 8.6–10.3)
CALCIUM SERPL-MCNC: 9 MG/DL (ref 8.6–10.3)
CALCIUM SERPL-MCNC: 9.2 MG/DL (ref 8.6–10.3)
CALCIUM SERPL-MCNC: 9.3 MG/DL (ref 8.6–10.3)
CALCIUM SERPL-MCNC: 9.4 MG/DL (ref 8.6–10.3)
CALCIUM SERPL-MCNC: 9.6 MG/DL (ref 8.6–10.3)
CALCIUM SERPL-MCNC: 9.7 MG/DL (ref 8.6–10.3)
CALCIUM SERPL-MCNC: 9.7 MG/DL (ref 8.6–10.3)
CARDIAC TROPONIN I PNL SERPL HS: 11 NG/L (ref 0–20)
CARDIAC TROPONIN I PNL SERPL HS: 11 NG/L (ref 0–20)
CHLORIDE BLDA-SCNC: 102 MMOL/L (ref 98–107)
CHLORIDE BLDA-SCNC: 86 MMOL/L (ref 98–107)
CHLORIDE BLDA-SCNC: 95 MMOL/L (ref 98–107)
CHLORIDE BLDA-SCNC: ABNORMAL MMOL/L
CHLORIDE SERPL-SCNC: 100 MMOL/L (ref 98–107)
CHLORIDE SERPL-SCNC: 101 MMOL/L (ref 98–107)
CHLORIDE SERPL-SCNC: 102 MMOL/L (ref 98–107)
CHLORIDE SERPL-SCNC: 102 MMOL/L (ref 98–107)
CHLORIDE SERPL-SCNC: 103 MMOL/L (ref 98–107)
CHLORIDE SERPL-SCNC: 103 MMOL/L (ref 98–107)
CHLORIDE SERPL-SCNC: 86 MMOL/L (ref 98–107)
CHLORIDE SERPL-SCNC: 93 MMOL/L (ref 98–107)
CHLORIDE SERPL-SCNC: 95 MMOL/L (ref 98–107)
CHLORIDE SERPL-SCNC: 96 MMOL/L (ref 98–107)
CHLORIDE SERPL-SCNC: 99 MMOL/L (ref 98–107)
CHLORIDE SERPL-SCNC: 99 MMOL/L (ref 98–107)
CHOLEST SERPL-MCNC: 116 MG/DL (ref 0–199)
CHOLESTEROL/HDL RATIO: 2.2
CK SERPL-CCNC: 720 U/L (ref 0–325)
CO2 SERPL-SCNC: 29 MMOL/L (ref 21–32)
CO2 SERPL-SCNC: 29 MMOL/L (ref 21–32)
CO2 SERPL-SCNC: 31 MMOL/L (ref 21–32)
CO2 SERPL-SCNC: 31 MMOL/L (ref 21–32)
CO2 SERPL-SCNC: 32 MMOL/L (ref 21–32)
CO2 SERPL-SCNC: 33 MMOL/L (ref 21–32)
CO2 SERPL-SCNC: 33 MMOL/L (ref 21–32)
CO2 SERPL-SCNC: 34 MMOL/L (ref 21–32)
CO2 SERPL-SCNC: 39 MMOL/L (ref 21–32)
CO2 SERPL-SCNC: 40 MMOL/L (ref 21–32)
COLOR UR: YELLOW
CREAT SERPL-MCNC: 0.41 MG/DL (ref 0.5–1.3)
CREAT SERPL-MCNC: 0.44 MG/DL (ref 0.5–1.3)
CREAT SERPL-MCNC: 0.45 MG/DL (ref 0.5–1.3)
CREAT SERPL-MCNC: 0.47 MG/DL (ref 0.5–1.3)
CREAT SERPL-MCNC: 0.49 MG/DL (ref 0.5–1.3)
CREAT SERPL-MCNC: 0.61 MG/DL (ref 0.5–1.3)
CREAT SERPL-MCNC: 0.72 MG/DL (ref 0.5–1.3)
CREAT SERPL-MCNC: 0.76 MG/DL (ref 0.5–1.3)
CREAT SERPL-MCNC: 0.77 MG/DL (ref 0.5–1.3)
CREAT SERPL-MCNC: 0.8 MG/DL (ref 0.5–1.3)
CREAT SERPL-MCNC: 0.88 MG/DL (ref 0.5–1.3)
CREAT SERPL-MCNC: 1.2 MG/DL (ref 0.5–1.3)
CRITICAL CALL TIME: 1022
CRITICAL CALL TIME: 1319
CRITICAL CALL TIME: 1605
CRITICAL CALL TIME: 507
CRITICAL CALL TIME: 800
CRITICAL CALL TIME: 938
CRITICAL CALLED BY: ABNORMAL
CRITICAL CALLED TO: ABNORMAL
CRITICAL READ BACK: ABNORMAL
DISPENSE STATUS: NORMAL
DRVVT SCREEN TO CONFIRM RATIO: 0.99 RATIO
DRVVT/DRVVT CFM NRMLZD PPP-RTO: 1.03 RATIO
DRVVT/DRVVT CFM P DOAC NEUT NORM PPP-RTO: 0.96 RATIO
EGFRCR SERPLBLD CKD-EPI 2021: 64 ML/MIN/1.73M*2
EGFRCR SERPLBLD CKD-EPI 2021: >90 ML/MIN/1.73M*2
ENTEROVIRUS/RHINOVIRUS RVP, VIRC: POSITIVE
EOSINOPHIL # BLD AUTO: 0 X10*3/UL (ref 0–0.4)
EOSINOPHIL # BLD AUTO: 0.01 X10*3/UL (ref 0–0.4)
EOSINOPHIL # BLD AUTO: 0.01 X10*3/UL (ref 0–0.4)
EOSINOPHIL # BLD AUTO: 0.02 X10*3/UL (ref 0–0.4)
EOSINOPHIL # BLD AUTO: 0.03 X10*3/UL (ref 0–0.4)
EOSINOPHIL NFR BLD AUTO: 0 %
EOSINOPHIL NFR BLD AUTO: 0.1 %
EOSINOPHIL NFR BLD AUTO: 0.1 %
EOSINOPHIL NFR BLD AUTO: 0.7 %
EPAP CMH2O: 5 CM H2O
EPAP CMH2O: 6 CM H2O
ERYTHROCYTE [DISTWIDTH] IN BLOOD BY AUTOMATED COUNT: 12.7 % (ref 11.5–14.5)
ERYTHROCYTE [DISTWIDTH] IN BLOOD BY AUTOMATED COUNT: 12.8 % (ref 11.5–14.5)
ERYTHROCYTE [DISTWIDTH] IN BLOOD BY AUTOMATED COUNT: 13.1 % (ref 11.5–14.5)
ERYTHROCYTE [DISTWIDTH] IN BLOOD BY AUTOMATED COUNT: 13.3 % (ref 11.5–14.5)
ERYTHROCYTE [DISTWIDTH] IN BLOOD BY AUTOMATED COUNT: 13.3 % (ref 11.5–14.5)
ERYTHROCYTE [DISTWIDTH] IN BLOOD BY AUTOMATED COUNT: 16.6 % (ref 11.5–14.5)
ERYTHROCYTE [DISTWIDTH] IN BLOOD BY AUTOMATED COUNT: 16.6 % (ref 11.5–14.5)
ERYTHROCYTE [DISTWIDTH] IN BLOOD BY AUTOMATED COUNT: 16.9 % (ref 11.5–14.5)
ERYTHROCYTE [DISTWIDTH] IN BLOOD BY AUTOMATED COUNT: 17.6 % (ref 11.5–14.5)
ERYTHROCYTE [DISTWIDTH] IN BLOOD BY AUTOMATED COUNT: 18.5 % (ref 11.5–14.5)
ERYTHROCYTE [DISTWIDTH] IN BLOOD BY AUTOMATED COUNT: 19.6 % (ref 11.5–14.5)
ERYTHROCYTE [DISTWIDTH] IN BLOOD BY AUTOMATED COUNT: 19.9 % (ref 11.5–14.5)
EST. AVERAGE GLUCOSE BLD GHB EST-MCNC: 100 MG/DL
FLUAV RNA RESP QL NAA+PROBE: NOT DETECTED
FLUBV RNA RESP QL NAA+PROBE: NOT DETECTED
GLUCOSE BLD MANUAL STRIP-MCNC: 103 MG/DL (ref 74–99)
GLUCOSE BLDA-MCNC: 101 MG/DL (ref 74–99)
GLUCOSE BLDA-MCNC: 109 MG/DL (ref 74–99)
GLUCOSE BLDA-MCNC: 113 MG/DL (ref 74–99)
GLUCOSE BLDA-MCNC: 114 MG/DL (ref 74–99)
GLUCOSE BLDA-MCNC: 123 MG/DL (ref 74–99)
GLUCOSE BLDA-MCNC: 123 MG/DL (ref 74–99)
GLUCOSE BLDA-MCNC: 126 MG/DL (ref 74–99)
GLUCOSE BLDA-MCNC: 129 MG/DL (ref 74–99)
GLUCOSE BLDA-MCNC: 132 MG/DL (ref 74–99)
GLUCOSE BLDA-MCNC: 135 MG/DL (ref 74–99)
GLUCOSE BLDA-MCNC: 144 MG/DL (ref 74–99)
GLUCOSE BLDA-MCNC: 145 MG/DL (ref 74–99)
GLUCOSE BLDA-MCNC: 180 MG/DL (ref 74–99)
GLUCOSE BLDA-MCNC: 221 MG/DL (ref 74–99)
GLUCOSE BLDA-MCNC: 97 MG/DL (ref 74–99)
GLUCOSE SERPL-MCNC: 106 MG/DL (ref 74–99)
GLUCOSE SERPL-MCNC: 111 MG/DL (ref 74–99)
GLUCOSE SERPL-MCNC: 124 MG/DL (ref 74–99)
GLUCOSE SERPL-MCNC: 127 MG/DL (ref 74–99)
GLUCOSE SERPL-MCNC: 128 MG/DL (ref 74–99)
GLUCOSE SERPL-MCNC: 137 MG/DL (ref 74–99)
GLUCOSE SERPL-MCNC: 141 MG/DL (ref 74–99)
GLUCOSE SERPL-MCNC: 142 MG/DL (ref 74–99)
GLUCOSE SERPL-MCNC: 145 MG/DL (ref 74–99)
GLUCOSE SERPL-MCNC: 88 MG/DL (ref 74–99)
GLUCOSE SERPL-MCNC: 89 MG/DL (ref 74–99)
GLUCOSE SERPL-MCNC: 97 MG/DL (ref 74–99)
GLUCOSE UR STRIP.AUTO-MCNC: NEGATIVE MG/DL
HBA1C MFR BLD: 5.1 %
HCO3 BLDA-SCNC: 26.3 MMOL/L (ref 22–26)
HCO3 BLDA-SCNC: 26.8 MMOL/L (ref 22–26)
HCO3 BLDA-SCNC: 28.5 MMOL/L (ref 22–26)
HCO3 BLDA-SCNC: 30.4 MMOL/L (ref 22–26)
HCO3 BLDA-SCNC: 31.5 MMOL/L (ref 22–26)
HCO3 BLDA-SCNC: 31.7 MMOL/L (ref 22–26)
HCO3 BLDA-SCNC: 31.7 MMOL/L (ref 22–26)
HCO3 BLDA-SCNC: 33 MMOL/L (ref 22–26)
HCO3 BLDA-SCNC: 33.3 MMOL/L (ref 22–26)
HCO3 BLDA-SCNC: 34 MMOL/L (ref 22–26)
HCO3 BLDA-SCNC: 34.2 MMOL/L (ref 22–26)
HCO3 BLDA-SCNC: 35.6 MMOL/L (ref 22–26)
HCO3 BLDA-SCNC: 35.9 MMOL/L (ref 22–26)
HCO3 BLDA-SCNC: 39.6 MMOL/L (ref 22–26)
HCO3 BLDA-SCNC: 49.6 MMOL/L (ref 22–26)
HCT VFR BLD AUTO: 26 % (ref 41–52)
HCT VFR BLD AUTO: 29 % (ref 41–52)
HCT VFR BLD AUTO: 29.1 % (ref 41–52)
HCT VFR BLD AUTO: 30 % (ref 41–52)
HCT VFR BLD AUTO: 30.3 % (ref 41–52)
HCT VFR BLD AUTO: 30.3 % (ref 41–52)
HCT VFR BLD AUTO: 31 % (ref 41–52)
HCT VFR BLD AUTO: 33 % (ref 41–52)
HCT VFR BLD AUTO: 39.4 % (ref 41–52)
HCT VFR BLD AUTO: 40.8 % (ref 41–52)
HCT VFR BLD AUTO: 41.9 % (ref 41–52)
HCT VFR BLD AUTO: 44.1 % (ref 41–52)
HCT VFR BLD EST: 26 % (ref 41–52)
HCT VFR BLD EST: 27 % (ref 41–52)
HCT VFR BLD EST: 28 % (ref 41–52)
HCT VFR BLD EST: 30 % (ref 41–52)
HCT VFR BLD EST: 31 % (ref 41–52)
HCT VFR BLD EST: 32 % (ref 41–52)
HCT VFR BLD EST: 34 % (ref 41–52)
HCT VFR BLD EST: 35 % (ref 41–52)
HCT VFR BLD EST: 35 % (ref 41–52)
HCT VFR BLD EST: 36 % (ref 41–52)
HCT VFR BLD EST: 36 % (ref 41–52)
HCT VFR BLD EST: 39 % (ref 41–52)
HCT VFR BLD EST: 41 % (ref 41–52)
HDLC SERPL-MCNC: 52.3 MG/DL
HGB BLD-MCNC: 10 G/DL (ref 13.5–17.5)
HGB BLD-MCNC: 10 G/DL (ref 13.5–17.5)
HGB BLD-MCNC: 10.2 G/DL (ref 13.5–17.5)
HGB BLD-MCNC: 10.3 G/DL (ref 13.5–17.5)
HGB BLD-MCNC: 11.5 G/DL (ref 13.5–17.5)
HGB BLD-MCNC: 13.1 G/DL (ref 13.5–17.5)
HGB BLD-MCNC: 13.7 G/DL (ref 13.5–17.5)
HGB BLD-MCNC: 14.2 G/DL (ref 13.5–17.5)
HGB BLD-MCNC: 14.5 G/DL (ref 13.5–17.5)
HGB BLD-MCNC: 8.5 G/DL (ref 13.5–17.5)
HGB BLD-MCNC: 8.9 G/DL (ref 13.5–17.5)
HGB BLD-MCNC: 9.6 G/DL (ref 13.5–17.5)
HGB BLDA-MCNC: 10.1 G/DL (ref 13.5–17.5)
HGB BLDA-MCNC: 10.2 G/DL (ref 13.5–17.5)
HGB BLDA-MCNC: 10.8 G/DL (ref 13.5–17.5)
HGB BLDA-MCNC: 11.3 G/DL (ref 13.5–17.5)
HGB BLDA-MCNC: 11.5 G/DL (ref 13.5–17.5)
HGB BLDA-MCNC: 11.5 G/DL (ref 13.5–17.5)
HGB BLDA-MCNC: 11.9 G/DL (ref 13.5–17.5)
HGB BLDA-MCNC: 11.9 G/DL (ref 13.5–17.5)
HGB BLDA-MCNC: 13 G/DL (ref 13.5–17.5)
HGB BLDA-MCNC: 13.5 G/DL (ref 13.5–17.5)
HGB BLDA-MCNC: 13.6 G/DL (ref 13.5–17.5)
HGB BLDA-MCNC: 13.7 G/DL (ref 13.5–17.5)
HGB BLDA-MCNC: 8.5 G/DL (ref 13.5–17.5)
HGB BLDA-MCNC: 9 G/DL (ref 13.5–17.5)
HGB BLDA-MCNC: 9.2 G/DL (ref 13.5–17.5)
HOLD SPECIMEN: NORMAL
HUMAN BOCAVIRUS RVP, VIRC: NOT DETECTED
HUMAN CORONAVIRUS RVP, VIRC: NOT DETECTED
HYALINE CASTS #/AREA URNS AUTO: ABNORMAL /LPF
IMM GRANULOCYTES # BLD AUTO: 0.01 X10*3/UL (ref 0–0.5)
IMM GRANULOCYTES # BLD AUTO: 0.02 X10*3/UL (ref 0–0.5)
IMM GRANULOCYTES # BLD AUTO: 0.07 X10*3/UL (ref 0–0.5)
IMM GRANULOCYTES # BLD AUTO: 0.09 X10*3/UL (ref 0–0.5)
IMM GRANULOCYTES # BLD AUTO: 0.09 X10*3/UL (ref 0–0.5)
IMM GRANULOCYTES # BLD AUTO: 0.1 X10*3/UL (ref 0–0.5)
IMM GRANULOCYTES # BLD AUTO: 0.16 X10*3/UL (ref 0–0.5)
IMM GRANULOCYTES # BLD AUTO: 0.27 X10*3/UL (ref 0–0.5)
IMM GRANULOCYTES NFR BLD AUTO: 0.2 % (ref 0–0.9)
IMM GRANULOCYTES NFR BLD AUTO: 0.3 % (ref 0–0.9)
IMM GRANULOCYTES NFR BLD AUTO: 0.5 % (ref 0–0.9)
IMM GRANULOCYTES NFR BLD AUTO: 0.6 % (ref 0–0.9)
IMM GRANULOCYTES NFR BLD AUTO: 0.7 % (ref 0–0.9)
IMM GRANULOCYTES NFR BLD AUTO: 1.1 % (ref 0–0.9)
INFLUENZA A , VIRC: NOT DETECTED
INFLUENZA A H1N1-09 , VIRC: NOT DETECTED
INFLUENZA B PCR, VIRC: NOT DETECTED
INHALED O2 CONCENTRATION: 30 %
INHALED O2 CONCENTRATION: 30 %
INHALED O2 CONCENTRATION: 4 %
INHALED O2 CONCENTRATION: 40 %
INHALED O2 CONCENTRATION: 50 %
INR PPP: 1 (ref 0.9–1.1)
INR PPP: 1 (ref 0.9–1.1)
INR PPP: 1.1 (ref 0.9–1.1)
IPAP CMH2O: 12 CM H2O
IPAP CMH2O: 20 CM H2O
KETONES UR STRIP.AUTO-MCNC: ABNORMAL MG/DL
LA 2 SCREEN W REFLEX-IMP: NORMAL
LACTATE BLDA-SCNC: 0.5 MMOL/L (ref 0.4–2)
LACTATE BLDA-SCNC: 0.5 MMOL/L (ref 0.4–2)
LACTATE BLDA-SCNC: 0.7 MMOL/L (ref 0.4–2)
LACTATE BLDA-SCNC: 0.8 MMOL/L (ref 0.4–2)
LACTATE BLDA-SCNC: 0.9 MMOL/L (ref 0.4–2)
LACTATE BLDA-SCNC: 1.1 MMOL/L (ref 0.4–2)
LACTATE BLDA-SCNC: 1.3 MMOL/L (ref 0.4–2)
LACTATE BLDA-SCNC: 1.5 MMOL/L (ref 0.4–2)
LACTATE BLDA-SCNC: 1.9 MMOL/L (ref 0.4–2)
LDLC SERPL CALC-MCNC: 53 MG/DL
LEUKOCYTE ESTERASE UR QL STRIP.AUTO: ABNORMAL
LYMPHOCYTES # BLD AUTO: 0.15 X10*3/UL (ref 0.8–3)
LYMPHOCYTES # BLD AUTO: 0.17 X10*3/UL (ref 0.8–3)
LYMPHOCYTES # BLD AUTO: 0.2 X10*3/UL (ref 0.8–3)
LYMPHOCYTES # BLD AUTO: 0.44 X10*3/UL (ref 0.8–3)
LYMPHOCYTES # BLD AUTO: 0.46 X10*3/UL (ref 0.8–3)
LYMPHOCYTES # BLD AUTO: 0.48 X10*3/UL (ref 0.8–3)
LYMPHOCYTES # BLD AUTO: 0.83 X10*3/UL (ref 0.8–3)
LYMPHOCYTES # BLD AUTO: 0.85 X10*3/UL (ref 0.8–3)
LYMPHOCYTES NFR BLD AUTO: 0.8 %
LYMPHOCYTES NFR BLD AUTO: 1.3 %
LYMPHOCYTES NFR BLD AUTO: 1.3 %
LYMPHOCYTES NFR BLD AUTO: 1.8 %
LYMPHOCYTES NFR BLD AUTO: 12.3 %
LYMPHOCYTES NFR BLD AUTO: 18.5 %
LYMPHOCYTES NFR BLD AUTO: 2.1 %
LYMPHOCYTES NFR BLD AUTO: 2.7 %
MAGNESIUM SERPL-MCNC: 1.72 MG/DL (ref 1.6–2.4)
MAGNESIUM SERPL-MCNC: 1.8 MG/DL (ref 1.6–2.4)
MAGNESIUM SERPL-MCNC: 1.81 MG/DL (ref 1.6–2.4)
MAGNESIUM SERPL-MCNC: 1.98 MG/DL (ref 1.6–2.4)
MAGNESIUM SERPL-MCNC: 2.05 MG/DL (ref 1.6–2.4)
MAGNESIUM SERPL-MCNC: 2.13 MG/DL (ref 1.6–2.4)
MAGNESIUM SERPL-MCNC: 2.21 MG/DL (ref 1.6–2.4)
MCH RBC QN AUTO: 31.5 PG (ref 26–34)
MCH RBC QN AUTO: 32.1 PG (ref 26–34)
MCH RBC QN AUTO: 32.2 PG (ref 26–34)
MCH RBC QN AUTO: 32.6 PG (ref 26–34)
MCH RBC QN AUTO: 32.7 PG (ref 26–34)
MCH RBC QN AUTO: 32.8 PG (ref 26–34)
MCH RBC QN AUTO: 32.9 PG (ref 26–34)
MCH RBC QN AUTO: 36.2 PG (ref 26–34)
MCH RBC QN AUTO: 36.5 PG (ref 26–34)
MCH RBC QN AUTO: 36.6 PG (ref 26–34)
MCH RBC QN AUTO: 36.7 PG (ref 26–34)
MCH RBC QN AUTO: 36.8 PG (ref 26–34)
MCHC RBC AUTO-ENTMCNC: 30.7 G/DL (ref 32–36)
MCHC RBC AUTO-ENTMCNC: 32 G/DL (ref 32–36)
MCHC RBC AUTO-ENTMCNC: 32.3 G/DL (ref 32–36)
MCHC RBC AUTO-ENTMCNC: 32.7 G/DL (ref 32–36)
MCHC RBC AUTO-ENTMCNC: 32.9 G/DL (ref 32–36)
MCHC RBC AUTO-ENTMCNC: 33 G/DL (ref 32–36)
MCHC RBC AUTO-ENTMCNC: 33.2 G/DL (ref 32–36)
MCHC RBC AUTO-ENTMCNC: 33.6 G/DL (ref 32–36)
MCHC RBC AUTO-ENTMCNC: 33.9 G/DL (ref 32–36)
MCHC RBC AUTO-ENTMCNC: 34 G/DL (ref 32–36)
MCHC RBC AUTO-ENTMCNC: 34.8 G/DL (ref 32–36)
MCHC RBC AUTO-ENTMCNC: 35.1 G/DL (ref 32–36)
MCV RBC AUTO: 101 FL (ref 80–100)
MCV RBC AUTO: 105 FL (ref 80–100)
MCV RBC AUTO: 108 FL (ref 80–100)
MCV RBC AUTO: 109 FL (ref 80–100)
MCV RBC AUTO: 109 FL (ref 80–100)
MCV RBC AUTO: 111 FL (ref 80–100)
MCV RBC AUTO: 113 FL (ref 80–100)
MCV RBC AUTO: 94 FL (ref 80–100)
MCV RBC AUTO: 98 FL (ref 80–100)
MCV RBC AUTO: 99 FL (ref 80–100)
METAPNEUMOVIRUS , VIRC: NOT DETECTED
MONOCYTES # BLD AUTO: 0.39 X10*3/UL (ref 0.05–0.8)
MONOCYTES # BLD AUTO: 0.55 X10*3/UL (ref 0.05–0.8)
MONOCYTES # BLD AUTO: 0.58 X10*3/UL (ref 0.05–0.8)
MONOCYTES # BLD AUTO: 0.59 X10*3/UL (ref 0.05–0.8)
MONOCYTES # BLD AUTO: 0.8 X10*3/UL (ref 0.05–0.8)
MONOCYTES # BLD AUTO: 1.2 X10*3/UL (ref 0.05–0.8)
MONOCYTES # BLD AUTO: 1.5 X10*3/UL (ref 0.05–0.8)
MONOCYTES # BLD AUTO: 2.27 X10*3/UL (ref 0.05–0.8)
MONOCYTES NFR BLD AUTO: 12.6 %
MONOCYTES NFR BLD AUTO: 2.9 %
MONOCYTES NFR BLD AUTO: 3.8 %
MONOCYTES NFR BLD AUTO: 4.3 %
MONOCYTES NFR BLD AUTO: 6.7 %
MONOCYTES NFR BLD AUTO: 6.7 %
MONOCYTES NFR BLD AUTO: 8.1 %
MONOCYTES NFR BLD AUTO: 9.4 %
MUCOUS THREADS #/AREA URNS AUTO: ABNORMAL /LPF
NEUTROPHILS # BLD AUTO: 12.6 X10*3/UL (ref 1.6–5.5)
NEUTROPHILS # BLD AUTO: 14.71 X10*3/UL (ref 1.6–5.5)
NEUTROPHILS # BLD AUTO: 16.25 X10*3/UL (ref 1.6–5.5)
NEUTROPHILS # BLD AUTO: 17.69 X10*3/UL (ref 1.6–5.5)
NEUTROPHILS # BLD AUTO: 20.12 X10*3/UL (ref 1.6–5.5)
NEUTROPHILS # BLD AUTO: 21.04 X10*3/UL (ref 1.6–5.5)
NEUTROPHILS # BLD AUTO: 3.08 X10*3/UL (ref 1.6–5.5)
NEUTROPHILS # BLD AUTO: 5.31 X10*3/UL (ref 1.6–5.5)
NEUTROPHILS NFR BLD AUTO: 67.1 %
NEUTROPHILS NFR BLD AUTO: 78.8 %
NEUTROPHILS NFR BLD AUTO: 87.4 %
NEUTROPHILS NFR BLD AUTO: 90 %
NEUTROPHILS NFR BLD AUTO: 90.5 %
NEUTROPHILS NFR BLD AUTO: 94.2 %
NEUTROPHILS NFR BLD AUTO: 94.3 %
NEUTROPHILS NFR BLD AUTO: 95.3 %
NITRITE UR QL STRIP.AUTO: NEGATIVE
NON HDL CHOLESTEROL: 64 MG/DL (ref 0–149)
NORMALIZED SCT PPP-RTO: 0.56 RATIO
NRBC BLD-RTO: 0 /100 WBCS (ref 0–0)
OXYHGB MFR BLDA: 95.4 % (ref 94–98)
OXYHGB MFR BLDA: 96.1 % (ref 94–98)
OXYHGB MFR BLDA: 96.3 % (ref 94–98)
OXYHGB MFR BLDA: 96.5 % (ref 94–98)
OXYHGB MFR BLDA: 96.6 % (ref 94–98)
OXYHGB MFR BLDA: 96.7 % (ref 94–98)
OXYHGB MFR BLDA: 96.7 % (ref 94–98)
OXYHGB MFR BLDA: 96.9 % (ref 94–98)
OXYHGB MFR BLDA: 97 % (ref 94–98)
OXYHGB MFR BLDA: 97 % (ref 94–98)
OXYHGB MFR BLDA: 97.1 % (ref 94–98)
OXYHGB MFR BLDA: 97.3 % (ref 94–98)
OXYHGB MFR BLDA: 97.4 % (ref 94–98)
OXYHGB MFR BLDA: 97.4 % (ref 94–98)
OXYHGB MFR BLDA: 97.5 % (ref 94–98)
P AXIS: 79 DEGREES
P AXIS: 85 DEGREES
P AXIS: 85 DEGREES
P AXIS: 88 DEGREES
P OFFSET: 177 MS
P OFFSET: 198 MS
P OFFSET: 204 MS
P OFFSET: 206 MS
P OFFSET: 208 MS
P ONSET: 143 MS
P ONSET: 154 MS
P ONSET: 158 MS
P ONSET: 159 MS
P ONSET: 165 MS
PARAINFLUENZA PCR, VIRC: NOT DETECTED
PATH REVIEW-CBC DIFFERENTIAL: NORMAL
PCO2 BLDA: 39 MM HG (ref 38–42)
PCO2 BLDA: 49 MM HG (ref 38–42)
PCO2 BLDA: 52 MM HG (ref 38–42)
PCO2 BLDA: 54 MM HG (ref 38–42)
PCO2 BLDA: 54 MM HG (ref 38–42)
PCO2 BLDA: 58 MM HG (ref 38–42)
PCO2 BLDA: 61 MM HG (ref 38–42)
PCO2 BLDA: 63 MM HG (ref 38–42)
PCO2 BLDA: 64 MM HG (ref 38–42)
PCO2 BLDA: 67 MM HG (ref 38–42)
PCO2 BLDA: 74 MM HG (ref 38–42)
PCO2 BLDA: 82 MM HG (ref 38–42)
PCO2 BLDA: 85 MM HG (ref 38–42)
PCO2 BLDA: 91 MM HG (ref 38–42)
PCO2 BLDA: 93 MM HG (ref 38–42)
PEEP CMH2O: 5 CM H2O
PH BLDA: 7.06 PH (ref 7.38–7.42)
PH BLDA: 7.2 PH (ref 7.38–7.42)
PH BLDA: 7.21 PH (ref 7.38–7.42)
PH BLDA: 7.24 PH (ref 7.38–7.42)
PH BLDA: 7.25 PH (ref 7.38–7.42)
PH BLDA: 7.3 PH (ref 7.38–7.42)
PH BLDA: 7.31 PH (ref 7.38–7.42)
PH BLDA: 7.33 PH (ref 7.38–7.42)
PH BLDA: 7.38 PH (ref 7.38–7.42)
PH BLDA: 7.39 PH (ref 7.38–7.42)
PH BLDA: 7.4 PH (ref 7.38–7.42)
PH BLDA: 7.41 PH (ref 7.38–7.42)
PH BLDA: 7.41 PH (ref 7.38–7.42)
PH BLDA: 7.44 PH (ref 7.38–7.42)
PH BLDA: 7.5 PH (ref 7.38–7.42)
PH UR STRIP.AUTO: 6 [PH]
PHOSPHATE SERPL-MCNC: 1.5 MG/DL (ref 2.5–4.9)
PHOSPHATE SERPL-MCNC: 1.9 MG/DL (ref 2.5–4.9)
PHOSPHATE SERPL-MCNC: 2.4 MG/DL (ref 2.5–4.9)
PHOSPHATE SERPL-MCNC: 2.4 MG/DL (ref 2.5–4.9)
PHOSPHATE SERPL-MCNC: 2.5 MG/DL (ref 2.5–4.9)
PHOSPHATE SERPL-MCNC: 2.8 MG/DL (ref 2.5–4.9)
PLATELET # BLD AUTO: 107 X10*3/UL (ref 150–450)
PLATELET # BLD AUTO: 110 X10*3/UL (ref 150–450)
PLATELET # BLD AUTO: 126 X10*3/UL (ref 150–450)
PLATELET # BLD AUTO: 145 X10*3/UL (ref 150–450)
PLATELET # BLD AUTO: 154 X10*3/UL (ref 150–450)
PLATELET # BLD AUTO: 168 X10*3/UL (ref 150–450)
PLATELET # BLD AUTO: 46 X10*3/UL (ref 150–450)
PLATELET # BLD AUTO: 71 X10*3/UL (ref 150–450)
PLATELET # BLD AUTO: 78 X10*3/UL (ref 150–450)
PLATELET # BLD AUTO: 80 X10*3/UL (ref 150–450)
PLATELET # BLD AUTO: 82 X10*3/UL (ref 150–450)
PLATELET # BLD AUTO: 90 X10*3/UL (ref 150–450)
PO2 BLDA: 100 MM HG (ref 85–95)
PO2 BLDA: 101 MM HG (ref 85–95)
PO2 BLDA: 103 MM HG (ref 85–95)
PO2 BLDA: 120 MM HG (ref 85–95)
PO2 BLDA: 143 MM HG (ref 85–95)
PO2 BLDA: 151 MM HG (ref 85–95)
PO2 BLDA: 155 MM HG (ref 85–95)
PO2 BLDA: 184 MM HG (ref 85–95)
PO2 BLDA: 185 MM HG (ref 85–95)
PO2 BLDA: 191 MM HG (ref 85–95)
PO2 BLDA: 205 MM HG (ref 85–95)
PO2 BLDA: 206 MM HG (ref 85–95)
PO2 BLDA: 219 MM HG (ref 85–95)
PO2 BLDA: 88 MM HG (ref 85–95)
PO2 BLDA: 92 MM HG (ref 85–95)
POTASSIUM BLDA-SCNC: 4.1 MMOL/L (ref 3.5–5.3)
POTASSIUM BLDA-SCNC: 4.3 MMOL/L (ref 3.5–5.3)
POTASSIUM BLDA-SCNC: 4.3 MMOL/L (ref 3.5–5.3)
POTASSIUM BLDA-SCNC: 4.4 MMOL/L (ref 3.5–5.3)
POTASSIUM BLDA-SCNC: 4.4 MMOL/L (ref 3.5–5.3)
POTASSIUM BLDA-SCNC: 4.6 MMOL/L (ref 3.5–5.3)
POTASSIUM BLDA-SCNC: 4.6 MMOL/L (ref 3.5–5.3)
POTASSIUM BLDA-SCNC: 5.1 MMOL/L (ref 3.5–5.3)
POTASSIUM BLDA-SCNC: 5.1 MMOL/L (ref 3.5–5.3)
POTASSIUM BLDA-SCNC: 5.3 MMOL/L (ref 3.5–5.3)
POTASSIUM BLDA-SCNC: 5.3 MMOL/L (ref 3.5–5.3)
POTASSIUM BLDA-SCNC: 5.4 MMOL/L (ref 3.5–5.3)
POTASSIUM BLDA-SCNC: 5.4 MMOL/L (ref 3.5–5.3)
POTASSIUM BLDA-SCNC: 6.2 MMOL/L (ref 3.5–5.3)
POTASSIUM BLDA-SCNC: 7 MMOL/L (ref 3.5–5.3)
POTASSIUM SERPL-SCNC: 3.9 MMOL/L (ref 3.5–5.3)
POTASSIUM SERPL-SCNC: 4.1 MMOL/L (ref 3.5–5.3)
POTASSIUM SERPL-SCNC: 4.3 MMOL/L (ref 3.5–5.3)
POTASSIUM SERPL-SCNC: 4.5 MMOL/L (ref 3.5–5.3)
POTASSIUM SERPL-SCNC: 4.5 MMOL/L (ref 3.5–5.3)
POTASSIUM SERPL-SCNC: 4.7 MMOL/L (ref 3.5–5.3)
POTASSIUM SERPL-SCNC: 5.1 MMOL/L (ref 3.5–5.3)
POTASSIUM SERPL-SCNC: 5.2 MMOL/L (ref 3.5–5.3)
POTASSIUM SERPL-SCNC: 6.8 MMOL/L (ref 3.5–5.3)
PR INTERVAL: 110 MS
PR INTERVAL: 124 MS
PR INTERVAL: 126 MS
PR INTERVAL: 130 MS
PR INTERVAL: 150 MS
PROCALCITONIN SERPL-MCNC: 0.14 NG/ML
PROCALCITONIN SERPL-MCNC: 0.39 NG/ML
PRODUCT BLOOD TYPE: 5100
PRODUCT BLOOD TYPE: 7300
PRODUCT BLOOD TYPE: 8400
PRODUCT BLOOD TYPE: 8400
PRODUCT BLOOD TYPE: 9500
PRODUCT BLOOD TYPE: 9500
PRODUCT CODE: NORMAL
PROT SERPL-MCNC: 5.2 G/DL (ref 6.4–8.2)
PROT SERPL-MCNC: 5.2 G/DL (ref 6.4–8.2)
PROT SERPL-MCNC: 5.6 G/DL (ref 6.4–8.2)
PROT SERPL-MCNC: 5.7 G/DL (ref 6.4–8.2)
PROT SERPL-MCNC: 6.9 G/DL (ref 6.4–8.2)
PROT SERPL-MCNC: 7.3 G/DL (ref 6.4–8.2)
PROT UR STRIP.AUTO-MCNC: ABNORMAL MG/DL
PROTHROMBIN TIME: 11 SECONDS (ref 9.8–12.8)
PROTHROMBIN TIME: 11.6 SECONDS (ref 9.8–12.8)
PROTHROMBIN TIME: 12.6 SECONDS (ref 9.8–12.8)
PSA SERPL-MCNC: 2.14 NG/ML
Q ONSET: 219 MS
Q ONSET: 219 MS
Q ONSET: 220 MS
Q ONSET: 220 MS
Q ONSET: 222 MS
QRS COUNT: 10 BEATS
QRS COUNT: 12 BEATS
QRS COUNT: 13 BEATS
QRS COUNT: 13 BEATS
QRS COUNT: 14 BEATS
QRS DURATION: 76 MS
QRS DURATION: 78 MS
QRS DURATION: 82 MS
QRS DURATION: 86 MS
QRS DURATION: 90 MS
QT INTERVAL: 372 MS
QT INTERVAL: 382 MS
QT INTERVAL: 388 MS
QT INTERVAL: 396 MS
QT INTERVAL: 460 MS
QTC CALCULATION(BAZETT): 426 MS
QTC CALCULATION(BAZETT): 439 MS
QTC CALCULATION(BAZETT): 446 MS
QTC CALCULATION(BAZETT): 458 MS
QTC CALCULATION(BAZETT): 460 MS
QTC FREDERICIA: 407 MS
QTC FREDERICIA: 424 MS
QTC FREDERICIA: 425 MS
QTC FREDERICIA: 433 MS
QTC FREDERICIA: 460 MS
R AXIS: 73 DEGREES
R AXIS: 76 DEGREES
R AXIS: 76 DEGREES
R AXIS: 85 DEGREES
R AXIS: 86 DEGREES
RBC # BLD AUTO: 2.31 X10*6/UL (ref 4.5–5.9)
RBC # BLD AUTO: 2.76 X10*6/UL (ref 4.5–5.9)
RBC # BLD AUTO: 3.05 X10*6/UL (ref 4.5–5.9)
RBC # BLD AUTO: 3.06 X10*6/UL (ref 4.5–5.9)
RBC # BLD AUTO: 3.07 X10*6/UL (ref 4.5–5.9)
RBC # BLD AUTO: 3.1 X10*6/UL (ref 4.5–5.9)
RBC # BLD AUTO: 3.21 X10*6/UL (ref 4.5–5.9)
RBC # BLD AUTO: 3.51 X10*6/UL (ref 4.5–5.9)
RBC # BLD AUTO: 3.62 X10*6/UL (ref 4.5–5.9)
RBC # BLD AUTO: 3.74 X10*6/UL (ref 4.5–5.9)
RBC # BLD AUTO: 3.87 X10*6/UL (ref 4.5–5.9)
RBC # BLD AUTO: 3.97 X10*6/UL (ref 4.5–5.9)
RBC # UR STRIP.AUTO: ABNORMAL /UL
RBC #/AREA URNS AUTO: ABNORMAL /HPF
RH FACTOR (ANTIGEN D): NORMAL
RH FACTOR (ANTIGEN D): NORMAL
RSV PCR, RVP, VIRC: POSITIVE
RSV RNA RESP QL NAA+PROBE: DETECTED
SAO2 % BLDA: 100 % (ref 94–100)
SAO2 % BLDA: 99 % (ref 94–100)
SARS-COV-2 RNA RESP QL NAA+PROBE: NOT DETECTED
SILICA CLOTTING TIME CONFIRMATION: 1.91 RATIO
SILICA CLOTTING TIME SCREEN: 1.07 RATIO
SODIUM BLDA-SCNC: 125 MMOL/L (ref 136–145)
SODIUM BLDA-SCNC: 125 MMOL/L (ref 136–145)
SODIUM BLDA-SCNC: 127 MMOL/L (ref 136–145)
SODIUM BLDA-SCNC: 128 MMOL/L (ref 136–145)
SODIUM BLDA-SCNC: 129 MMOL/L (ref 136–145)
SODIUM BLDA-SCNC: 134 MMOL/L (ref 136–145)
SODIUM BLDA-SCNC: 137 MMOL/L (ref 136–145)
SODIUM BLDA-SCNC: 137 MMOL/L (ref 136–145)
SODIUM BLDA-SCNC: 138 MMOL/L (ref 136–145)
SODIUM BLDA-SCNC: 139 MMOL/L (ref 136–145)
SODIUM BLDA-SCNC: 139 MMOL/L (ref 136–145)
SODIUM BLDA-SCNC: 141 MMOL/L (ref 136–145)
SODIUM SERPL-SCNC: 126 MMOL/L (ref 136–145)
SODIUM SERPL-SCNC: 130 MMOL/L (ref 136–145)
SODIUM SERPL-SCNC: 132 MMOL/L (ref 136–145)
SODIUM SERPL-SCNC: 136 MMOL/L (ref 136–145)
SODIUM SERPL-SCNC: 136 MMOL/L (ref 136–145)
SODIUM SERPL-SCNC: 137 MMOL/L (ref 136–145)
SODIUM SERPL-SCNC: 138 MMOL/L (ref 136–145)
SODIUM SERPL-SCNC: 140 MMOL/L (ref 136–145)
SODIUM SERPL-SCNC: 141 MMOL/L (ref 136–145)
SODIUM SERPL-SCNC: 143 MMOL/L (ref 136–145)
SP GR UR STRIP.AUTO: 1.02
SPECIMEN DRAWN FROM PATIENT: ABNORMAL
T AXIS: 29 DEGREES
T AXIS: 46 DEGREES
T AXIS: 5 DEGREES
T AXIS: 66 DEGREES
T AXIS: 70 DEGREES
T OFFSET: 406 MS
T OFFSET: 413 MS
T OFFSET: 413 MS
T OFFSET: 418 MS
T OFFSET: 449 MS
TIDAL VOLUME: 400 ML
TRIGL SERPL-MCNC: 53 MG/DL (ref 0–149)
TSH SERPL-ACNC: 0.32 MIU/L (ref 0.44–3.98)
TSH SERPL-ACNC: 0.61 MIU/L (ref 0.44–3.98)
UFH PPP CHRO-ACNC: 0.1 IU/ML
UFH PPP CHRO-ACNC: 0.6 IU/ML
UFH PPP CHRO-ACNC: 0.6 IU/ML
UFH PPP CHRO-ACNC: 0.7 IU/ML
UNIT ABO: NORMAL
UNIT NUMBER: NORMAL
UNIT RH: NORMAL
UNIT VOLUME: 213
UNIT VOLUME: 214
UNIT VOLUME: 268
UNIT VOLUME: 268
UNIT VOLUME: 270
UNIT VOLUME: 278
UNIT VOLUME: 279
UNIT VOLUME: 282
UNIT VOLUME: 283
UNIT VOLUME: 306
UNIT VOLUME: 320
UNIT VOLUME: 322
UNIT VOLUME: 324
UNIT VOLUME: 326
UNIT VOLUME: 329
UNIT VOLUME: 350
UROBILINOGEN UR STRIP.AUTO-MCNC: 4 MG/DL
VENTILATOR MODE: ABNORMAL
VENTILATOR RATE: 22 BPM
VENTILATOR RATE: 22 BPM
VENTILATOR RATE: 26 BPM
VENTRICULAR RATE: 60 BPM
VENTRICULAR RATE: 74 BPM
VENTRICULAR RATE: 79 BPM
VENTRICULAR RATE: 82 BPM
VENTRICULAR RATE: 84 BPM
VLDL: 11 MG/DL (ref 0–40)
WBC # BLD AUTO: 12.1 X10*3/UL (ref 4.4–11.3)
WBC # BLD AUTO: 13.2 X10*3/UL (ref 4.4–11.3)
WBC # BLD AUTO: 15.6 X10*3/UL (ref 4.4–11.3)
WBC # BLD AUTO: 18 X10*3/UL (ref 4.4–11.3)
WBC # BLD AUTO: 18.8 X10*3/UL (ref 4.4–11.3)
WBC # BLD AUTO: 19.7 X10*3/UL (ref 4.4–11.3)
WBC # BLD AUTO: 22.3 X10*3/UL (ref 4.4–11.3)
WBC # BLD AUTO: 24.1 X10*3/UL (ref 4.4–11.3)
WBC # BLD AUTO: 3.8 X10*3/UL (ref 4.4–11.3)
WBC # BLD AUTO: 4.6 X10*3/UL (ref 4.4–11.3)
WBC # BLD AUTO: 6.8 X10*3/UL (ref 4.4–11.3)
WBC # BLD AUTO: 8.9 X10*3/UL (ref 4.4–11.3)
WBC #/AREA URNS AUTO: ABNORMAL /HPF
XM INTEP: NORMAL

## 2024-01-01 PROCEDURE — 84132 ASSAY OF SERUM POTASSIUM: CPT | Performed by: NURSE PRACTITIONER

## 2024-01-01 PROCEDURE — 36620 INSERTION CATHETER ARTERY: CPT

## 2024-01-01 PROCEDURE — 82550 ASSAY OF CK (CPK): CPT | Performed by: EMERGENCY MEDICINE

## 2024-01-01 PROCEDURE — S0109 METHADONE ORAL 5MG: HCPCS

## 2024-01-01 PROCEDURE — 99214 OFFICE O/P EST MOD 30 MIN: CPT | Performed by: NURSE PRACTITIONER

## 2024-01-01 PROCEDURE — 2500000001 HC RX 250 WO HCPCS SELF ADMINISTERED DRUGS (ALT 637 FOR MEDICARE OP): Performed by: STUDENT IN AN ORGANIZED HEALTH CARE EDUCATION/TRAINING PROGRAM

## 2024-01-01 PROCEDURE — 99291 CRITICAL CARE FIRST HOUR: CPT

## 2024-01-01 PROCEDURE — C9113 INJ PANTOPRAZOLE SODIUM, VIA: HCPCS | Performed by: HOSPITALIST

## 2024-01-01 PROCEDURE — 84132 ASSAY OF SERUM POTASSIUM: CPT

## 2024-01-01 PROCEDURE — 80048 BASIC METABOLIC PNL TOTAL CA: CPT | Performed by: NURSE PRACTITIONER

## 2024-01-01 PROCEDURE — 51701 INSERT BLADDER CATHETER: CPT

## 2024-01-01 PROCEDURE — S0109 METHADONE ORAL 5MG: HCPCS | Performed by: STUDENT IN AN ORGANIZED HEALTH CARE EDUCATION/TRAINING PROGRAM

## 2024-01-01 PROCEDURE — 2500000004 HC RX 250 GENERAL PHARMACY W/ HCPCS (ALT 636 FOR OP/ED)

## 2024-01-01 PROCEDURE — 99238 HOSP IP/OBS DSCHRG MGMT 30/<: CPT | Performed by: STUDENT IN AN ORGANIZED HEALTH CARE EDUCATION/TRAINING PROGRAM

## 2024-01-01 PROCEDURE — 83735 ASSAY OF MAGNESIUM: CPT | Performed by: NURSE PRACTITIONER

## 2024-01-01 PROCEDURE — 2500000004 HC RX 250 GENERAL PHARMACY W/ HCPCS (ALT 636 FOR OP/ED): Performed by: STUDENT IN AN ORGANIZED HEALTH CARE EDUCATION/TRAINING PROGRAM

## 2024-01-01 PROCEDURE — 84132 ASSAY OF SERUM POTASSIUM: CPT | Performed by: STUDENT IN AN ORGANIZED HEALTH CARE EDUCATION/TRAINING PROGRAM

## 2024-01-01 PROCEDURE — 2500000004 HC RX 250 GENERAL PHARMACY W/ HCPCS (ALT 636 FOR OP/ED): Performed by: EMERGENCY MEDICINE

## 2024-01-01 PROCEDURE — 2500000004 HC RX 250 GENERAL PHARMACY W/ HCPCS (ALT 636 FOR OP/ED): Performed by: NURSE PRACTITIONER

## 2024-01-01 PROCEDURE — 94640 AIRWAY INHALATION TREATMENT: CPT

## 2024-01-01 PROCEDURE — 36415 COLL VENOUS BLD VENIPUNCTURE: CPT | Performed by: STUDENT IN AN ORGANIZED HEALTH CARE EDUCATION/TRAINING PROGRAM

## 2024-01-01 PROCEDURE — 0W3P8ZZ CONTROL BLEEDING IN GASTROINTESTINAL TRACT, VIA NATURAL OR ARTIFICIAL OPENING ENDOSCOPIC: ICD-10-PCS | Performed by: STUDENT IN AN ORGANIZED HEALTH CARE EDUCATION/TRAINING PROGRAM

## 2024-01-01 PROCEDURE — 5A1955Z RESPIRATORY VENTILATION, GREATER THAN 96 CONSECUTIVE HOURS: ICD-10-PCS | Performed by: STUDENT IN AN ORGANIZED HEALTH CARE EDUCATION/TRAINING PROGRAM

## 2024-01-01 PROCEDURE — 2500000004 HC RX 250 GENERAL PHARMACY W/ HCPCS (ALT 636 FOR OP/ED): Performed by: HOSPITALIST

## 2024-01-01 PROCEDURE — 2500000005 HC RX 250 GENERAL PHARMACY W/O HCPCS: Performed by: EMERGENCY MEDICINE

## 2024-01-01 PROCEDURE — 3008F BODY MASS INDEX DOCD: CPT | Performed by: NURSE PRACTITIONER

## 2024-01-01 PROCEDURE — 85025 COMPLETE CBC W/AUTO DIFF WBC: CPT | Performed by: NURSE PRACTITIONER

## 2024-01-01 PROCEDURE — 51702 INSERT TEMP BLADDER CATH: CPT

## 2024-01-01 PROCEDURE — 99285 EMERGENCY DEPT VISIT HI MDM: CPT | Mod: 59,25

## 2024-01-01 PROCEDURE — 03HY32Z INSERTION OF MONITORING DEVICE INTO UPPER ARTERY, PERCUTANEOUS APPROACH: ICD-10-PCS | Performed by: STUDENT IN AN ORGANIZED HEALTH CARE EDUCATION/TRAINING PROGRAM

## 2024-01-01 PROCEDURE — 2500000001 HC RX 250 WO HCPCS SELF ADMINISTERED DRUGS (ALT 637 FOR MEDICARE OP): Performed by: NURSE PRACTITIONER

## 2024-01-01 PROCEDURE — 36415 COLL VENOUS BLD VENIPUNCTURE: CPT

## 2024-01-01 PROCEDURE — 84443 ASSAY THYROID STIM HORMONE: CPT | Performed by: STUDENT IN AN ORGANIZED HEALTH CARE EDUCATION/TRAINING PROGRAM

## 2024-01-01 PROCEDURE — 85025 COMPLETE CBC W/AUTO DIFF WBC: CPT | Performed by: EMERGENCY MEDICINE

## 2024-01-01 PROCEDURE — 2020000001 HC ICU ROOM DAILY

## 2024-01-01 PROCEDURE — 3078F DIAST BP <80 MM HG: CPT | Performed by: NURSE PRACTITIONER

## 2024-01-01 PROCEDURE — 84100 ASSAY OF PHOSPHORUS: CPT | Performed by: EMERGENCY MEDICINE

## 2024-01-01 PROCEDURE — 2500000005 HC RX 250 GENERAL PHARMACY W/O HCPCS: Performed by: NURSE PRACTITIONER

## 2024-01-01 PROCEDURE — 93005 ELECTROCARDIOGRAM TRACING: CPT

## 2024-01-01 PROCEDURE — 82435 ASSAY OF BLOOD CHLORIDE: CPT | Performed by: EMERGENCY MEDICINE

## 2024-01-01 PROCEDURE — 87040 BLOOD CULTURE FOR BACTERIA: CPT | Mod: ELYLAB | Performed by: STUDENT IN AN ORGANIZED HEALTH CARE EDUCATION/TRAINING PROGRAM

## 2024-01-01 PROCEDURE — 94003 VENT MGMT INPAT SUBQ DAY: CPT

## 2024-01-01 PROCEDURE — 0D9670Z DRAINAGE OF STOMACH WITH DRAINAGE DEVICE, VIA NATURAL OR ARTIFICIAL OPENING: ICD-10-PCS | Performed by: STUDENT IN AN ORGANIZED HEALTH CARE EDUCATION/TRAINING PROGRAM

## 2024-01-01 PROCEDURE — 71045 X-RAY EXAM CHEST 1 VIEW: CPT

## 2024-01-01 PROCEDURE — 36415 COLL VENOUS BLD VENIPUNCTURE: CPT | Performed by: NURSE PRACTITIONER

## 2024-01-01 PROCEDURE — 94640 AIRWAY INHALATION TREATMENT: CPT | Mod: MUE

## 2024-01-01 PROCEDURE — 74174 CTA ABD&PLVS W/CONTRAST: CPT | Performed by: RADIOLOGY

## 2024-01-01 PROCEDURE — 2500000002 HC RX 250 W HCPCS SELF ADMINISTERED DRUGS (ALT 637 FOR MEDICARE OP, ALT 636 FOR OP/ED): Performed by: STUDENT IN AN ORGANIZED HEALTH CARE EDUCATION/TRAINING PROGRAM

## 2024-01-01 PROCEDURE — 81001 URINALYSIS AUTO W/SCOPE: CPT

## 2024-01-01 PROCEDURE — P9016 RBC LEUKOCYTES REDUCED: HCPCS

## 2024-01-01 PROCEDURE — 87632 RESP VIRUS 6-11 TARGETS: CPT | Performed by: STUDENT IN AN ORGANIZED HEALTH CARE EDUCATION/TRAINING PROGRAM

## 2024-01-01 PROCEDURE — 36600 WITHDRAWAL OF ARTERIAL BLOOD: CPT

## 2024-01-01 PROCEDURE — 93283 PRGRMG EVAL IMPLANTABLE DFB: CPT

## 2024-01-01 PROCEDURE — 84132 ASSAY OF SERUM POTASSIUM: CPT | Performed by: HOSPITALIST

## 2024-01-01 PROCEDURE — 84132 ASSAY OF SERUM POTASSIUM: CPT | Performed by: EMERGENCY MEDICINE

## 2024-01-01 PROCEDURE — 83735 ASSAY OF MAGNESIUM: CPT | Performed by: HOSPITALIST

## 2024-01-01 PROCEDURE — 2550000001 HC RX 255 CONTRASTS: Performed by: INTERNAL MEDICINE

## 2024-01-01 PROCEDURE — 87040 BLOOD CULTURE FOR BACTERIA: CPT | Mod: ELYLAB

## 2024-01-01 PROCEDURE — 7100000010 HC PHASE TWO TIME - EACH INCREMENTAL 1 MINUTE: Performed by: INTERNAL MEDICINE

## 2024-01-01 PROCEDURE — 2500000004 HC RX 250 GENERAL PHARMACY W/ HCPCS (ALT 636 FOR OP/ED): Performed by: INTERNAL MEDICINE

## 2024-01-01 PROCEDURE — 99232 SBSQ HOSP IP/OBS MODERATE 35: CPT | Performed by: STUDENT IN AN ORGANIZED HEALTH CARE EDUCATION/TRAINING PROGRAM

## 2024-01-01 PROCEDURE — 85027 COMPLETE CBC AUTOMATED: CPT | Performed by: EMERGENCY MEDICINE

## 2024-01-01 PROCEDURE — 93290 INTERROG DEV EVAL ICPMS IP: CPT | Performed by: INTERNAL MEDICINE

## 2024-01-01 PROCEDURE — 85060 BLOOD SMEAR INTERPRETATION: CPT | Performed by: STUDENT IN AN ORGANIZED HEALTH CARE EDUCATION/TRAINING PROGRAM

## 2024-01-01 PROCEDURE — 84484 ASSAY OF TROPONIN QUANT: CPT | Performed by: NURSE PRACTITIONER

## 2024-01-01 PROCEDURE — 94640 AIRWAY INHALATION TREATMENT: CPT | Mod: MUE | Performed by: STUDENT IN AN ORGANIZED HEALTH CARE EDUCATION/TRAINING PROGRAM

## 2024-01-01 PROCEDURE — 1150000001 HC HOSPICE PRIVATE ROOM DAILY

## 2024-01-01 PROCEDURE — 1125F AMNT PAIN NOTED PAIN PRSNT: CPT | Performed by: NURSE PRACTITIONER

## 2024-01-01 PROCEDURE — 05HM33Z INSERTION OF INFUSION DEVICE INTO RIGHT INTERNAL JUGULAR VEIN, PERCUTANEOUS APPROACH: ICD-10-PCS | Performed by: RADIOLOGY

## 2024-01-01 PROCEDURE — 93010 ELECTROCARDIOGRAM REPORT: CPT | Performed by: INTERNAL MEDICINE

## 2024-01-01 PROCEDURE — 93458 L HRT ARTERY/VENTRICLE ANGIO: CPT | Performed by: INTERNAL MEDICINE

## 2024-01-01 PROCEDURE — 93880 EXTRACRANIAL BILAT STUDY: CPT | Performed by: INTERNAL MEDICINE

## 2024-01-01 PROCEDURE — 84145 PROCALCITONIN (PCT): CPT | Mod: ELYLAB | Performed by: STUDENT IN AN ORGANIZED HEALTH CARE EDUCATION/TRAINING PROGRAM

## 2024-01-01 PROCEDURE — 93923 UPR/LXTR ART STDY 3+ LVLS: CPT

## 2024-01-01 PROCEDURE — 99291 CRITICAL CARE FIRST HOUR: CPT | Performed by: NURSE PRACTITIONER

## 2024-01-01 PROCEDURE — 83735 ASSAY OF MAGNESIUM: CPT

## 2024-01-01 PROCEDURE — 2500000001 HC RX 250 WO HCPCS SELF ADMINISTERED DRUGS (ALT 637 FOR MEDICARE OP): Performed by: INTERNAL MEDICINE

## 2024-01-01 PROCEDURE — 85520 HEPARIN ASSAY: CPT | Mod: 91 | Performed by: EMERGENCY MEDICINE

## 2024-01-01 PROCEDURE — 85730 THROMBOPLASTIN TIME PARTIAL: CPT | Performed by: STUDENT IN AN ORGANIZED HEALTH CARE EDUCATION/TRAINING PROGRAM

## 2024-01-01 PROCEDURE — 85730 THROMBOPLASTIN TIME PARTIAL: CPT | Mod: ELYLAB,91 | Performed by: STUDENT IN AN ORGANIZED HEALTH CARE EDUCATION/TRAINING PROGRAM

## 2024-01-01 PROCEDURE — 85027 COMPLETE CBC AUTOMATED: CPT | Performed by: STUDENT IN AN ORGANIZED HEALTH CARE EDUCATION/TRAINING PROGRAM

## 2024-01-01 PROCEDURE — 85520 HEPARIN ASSAY: CPT | Performed by: HOSPITALIST

## 2024-01-01 PROCEDURE — 2500000002 HC RX 250 W HCPCS SELF ADMINISTERED DRUGS (ALT 637 FOR MEDICARE OP, ALT 636 FOR OP/ED): Performed by: EMERGENCY MEDICINE

## 2024-01-01 PROCEDURE — 85610 PROTHROMBIN TIME: CPT | Performed by: HOSPITALIST

## 2024-01-01 PROCEDURE — 37799 UNLISTED PX VASCULAR SURGERY: CPT | Performed by: NURSE PRACTITIONER

## 2024-01-01 PROCEDURE — 93283 PRGRMG EVAL IMPLANTABLE DFB: CPT | Performed by: INTERNAL MEDICINE

## 2024-01-01 PROCEDURE — 1160F RVW MEDS BY RX/DR IN RCRD: CPT | Performed by: NURSE PRACTITIONER

## 2024-01-01 PROCEDURE — 2500000002 HC RX 250 W HCPCS SELF ADMINISTERED DRUGS (ALT 637 FOR MEDICARE OP, ALT 636 FOR OP/ED): Mod: MUE | Performed by: EMERGENCY MEDICINE

## 2024-01-01 PROCEDURE — 36556 INSERT NON-TUNNEL CV CATH: CPT | Performed by: EMERGENCY MEDICINE

## 2024-01-01 PROCEDURE — 2500000005 HC RX 250 GENERAL PHARMACY W/O HCPCS

## 2024-01-01 PROCEDURE — 3E043XZ INTRODUCTION OF VASOPRESSOR INTO CENTRAL VEIN, PERCUTANEOUS APPROACH: ICD-10-PCS | Performed by: STUDENT IN AN ORGANIZED HEALTH CARE EDUCATION/TRAINING PROGRAM

## 2024-01-01 PROCEDURE — 2500000002 HC RX 250 W HCPCS SELF ADMINISTERED DRUGS (ALT 637 FOR MEDICARE OP, ALT 636 FOR OP/ED)

## 2024-01-01 PROCEDURE — 99291 CRITICAL CARE FIRST HOUR: CPT | Performed by: EMERGENCY MEDICINE

## 2024-01-01 PROCEDURE — 71045 X-RAY EXAM CHEST 1 VIEW: CPT | Performed by: RADIOLOGY

## 2024-01-01 PROCEDURE — 86920 COMPATIBILITY TEST SPIN: CPT

## 2024-01-01 PROCEDURE — 37799 UNLISTED PX VASCULAR SURGERY: CPT | Performed by: EMERGENCY MEDICINE

## 2024-01-01 PROCEDURE — 2720000007 HC OR 272 NO HCPCS

## 2024-01-01 PROCEDURE — 1100000001 HC PRIVATE ROOM DAILY

## 2024-01-01 PROCEDURE — 85610 PROTHROMBIN TIME: CPT | Performed by: EMERGENCY MEDICINE

## 2024-01-01 PROCEDURE — 0BH17EZ INSERTION OF ENDOTRACHEAL AIRWAY INTO TRACHEA, VIA NATURAL OR ARTIFICIAL OPENING: ICD-10-PCS | Performed by: STUDENT IN AN ORGANIZED HEALTH CARE EDUCATION/TRAINING PROGRAM

## 2024-01-01 PROCEDURE — 36430 TRANSFUSION BLD/BLD COMPNT: CPT

## 2024-01-01 PROCEDURE — 5A09357 ASSISTANCE WITH RESPIRATORY VENTILATION, LESS THAN 24 CONSECUTIVE HOURS, CONTINUOUS POSITIVE AIRWAY PRESSURE: ICD-10-PCS | Performed by: STUDENT IN AN ORGANIZED HEALTH CARE EDUCATION/TRAINING PROGRAM

## 2024-01-01 PROCEDURE — 83880 ASSAY OF NATRIURETIC PEPTIDE: CPT | Performed by: NURSE PRACTITIONER

## 2024-01-01 PROCEDURE — 80053 COMPREHEN METABOLIC PANEL: CPT

## 2024-01-01 PROCEDURE — 99222 1ST HOSP IP/OBS MODERATE 55: CPT | Performed by: NURSE PRACTITIONER

## 2024-01-01 PROCEDURE — 84100 ASSAY OF PHOSPHORUS: CPT | Performed by: HOSPITALIST

## 2024-01-01 PROCEDURE — 2500000002 HC RX 250 W HCPCS SELF ADMINISTERED DRUGS (ALT 637 FOR MEDICARE OP, ALT 636 FOR OP/ED): Mod: MUE | Performed by: STUDENT IN AN ORGANIZED HEALTH CARE EDUCATION/TRAINING PROGRAM

## 2024-01-01 PROCEDURE — 84443 ASSAY THYROID STIM HORMONE: CPT

## 2024-01-01 PROCEDURE — C9113 INJ PANTOPRAZOLE SODIUM, VIA: HCPCS | Performed by: NURSE PRACTITIONER

## 2024-01-01 PROCEDURE — 99223 1ST HOSP IP/OBS HIGH 75: CPT | Performed by: STUDENT IN AN ORGANIZED HEALTH CARE EDUCATION/TRAINING PROGRAM

## 2024-01-01 PROCEDURE — 71275 CT ANGIOGRAPHY CHEST: CPT | Performed by: RADIOLOGY

## 2024-01-01 PROCEDURE — 0DJ08ZZ INSPECTION OF UPPER INTESTINAL TRACT, VIA NATURAL OR ARTIFICIAL OPENING ENDOSCOPIC: ICD-10-PCS | Performed by: INTERNAL MEDICINE

## 2024-01-01 PROCEDURE — 99232 SBSQ HOSP IP/OBS MODERATE 35: CPT | Performed by: NURSE PRACTITIONER

## 2024-01-01 PROCEDURE — 31500 INSERT EMERGENCY AIRWAY: CPT

## 2024-01-01 PROCEDURE — C9113 INJ PANTOPRAZOLE SODIUM, VIA: HCPCS | Performed by: EMERGENCY MEDICINE

## 2024-01-01 PROCEDURE — 85025 COMPLETE CBC W/AUTO DIFF WBC: CPT | Performed by: HOSPITALIST

## 2024-01-01 PROCEDURE — 74176 CT ABD & PELVIS W/O CONTRAST: CPT

## 2024-01-01 PROCEDURE — 7100000009 HC PHASE TWO TIME - INITIAL BASE CHARGE: Performed by: INTERNAL MEDICINE

## 2024-01-01 PROCEDURE — 80048 BASIC METABOLIC PNL TOTAL CA: CPT | Performed by: STUDENT IN AN ORGANIZED HEALTH CARE EDUCATION/TRAINING PROGRAM

## 2024-01-01 PROCEDURE — 99292 CRITICAL CARE ADDL 30 MIN: CPT | Performed by: EMERGENCY MEDICINE

## 2024-01-01 PROCEDURE — 80061 LIPID PANEL: CPT

## 2024-01-01 PROCEDURE — 94660 CPAP INITIATION&MGMT: CPT

## 2024-01-01 PROCEDURE — 1159F MED LIST DOCD IN RCRD: CPT | Performed by: NURSE PRACTITIONER

## 2024-01-01 PROCEDURE — 85025 COMPLETE CBC W/AUTO DIFF WBC: CPT

## 2024-01-01 PROCEDURE — 94002 VENT MGMT INPAT INIT DAY: CPT

## 2024-01-01 PROCEDURE — 71275 CT ANGIOGRAPHY CHEST: CPT

## 2024-01-01 PROCEDURE — 99152 MOD SED SAME PHYS/QHP 5/>YRS: CPT | Performed by: INTERNAL MEDICINE

## 2024-01-01 PROCEDURE — 2500000005 HC RX 250 GENERAL PHARMACY W/O HCPCS: Performed by: INTERNAL MEDICINE

## 2024-01-01 PROCEDURE — 43235 EGD DIAGNOSTIC BRUSH WASH: CPT | Performed by: INTERNAL MEDICINE

## 2024-01-01 PROCEDURE — 83735 ASSAY OF MAGNESIUM: CPT | Performed by: EMERGENCY MEDICINE

## 2024-01-01 PROCEDURE — P9035 PLATELET PHERES LEUKOREDUCED: HCPCS

## 2024-01-01 PROCEDURE — C1894 INTRO/SHEATH, NON-LASER: HCPCS | Performed by: INTERNAL MEDICINE

## 2024-01-01 PROCEDURE — 84100 ASSAY OF PHOSPHORUS: CPT | Performed by: NURSE PRACTITIONER

## 2024-01-01 PROCEDURE — 87634 RSV DNA/RNA AMP PROBE: CPT | Performed by: NURSE PRACTITIONER

## 2024-01-01 PROCEDURE — 1200000002 HC GENERAL ROOM WITH TELEMETRY DAILY

## 2024-01-01 PROCEDURE — 85610 PROTHROMBIN TIME: CPT | Performed by: STUDENT IN AN ORGANIZED HEALTH CARE EDUCATION/TRAINING PROGRAM

## 2024-01-01 PROCEDURE — 85520 HEPARIN ASSAY: CPT | Performed by: STUDENT IN AN ORGANIZED HEALTH CARE EDUCATION/TRAINING PROGRAM

## 2024-01-01 PROCEDURE — 83036 HEMOGLOBIN GLYCOSYLATED A1C: CPT

## 2024-01-01 PROCEDURE — 82306 VITAMIN D 25 HYDROXY: CPT

## 2024-01-01 PROCEDURE — 2550000001 HC RX 255 CONTRASTS: Performed by: STUDENT IN AN ORGANIZED HEALTH CARE EDUCATION/TRAINING PROGRAM

## 2024-01-01 PROCEDURE — 93880 EXTRACRANIAL BILAT STUDY: CPT

## 2024-01-01 PROCEDURE — 2720000007 HC OR 272 NO HCPCS: Performed by: INTERNAL MEDICINE

## 2024-01-01 PROCEDURE — 82947 ASSAY GLUCOSE BLOOD QUANT: CPT

## 2024-01-01 PROCEDURE — 5A0935A ASSISTANCE WITH RESPIRATORY VENTILATION, LESS THAN 24 CONSECUTIVE HOURS, HIGH NASAL FLOW/VELOCITY: ICD-10-PCS | Performed by: STUDENT IN AN ORGANIZED HEALTH CARE EDUCATION/TRAINING PROGRAM

## 2024-01-01 PROCEDURE — 99239 HOSP IP/OBS DSCHRG MGMT >30: CPT | Performed by: STUDENT IN AN ORGANIZED HEALTH CARE EDUCATION/TRAINING PROGRAM

## 2024-01-01 PROCEDURE — 37799 UNLISTED PX VASCULAR SURGERY: CPT | Performed by: HOSPITALIST

## 2024-01-01 PROCEDURE — 93923 UPR/LXTR ART STDY 3+ LVLS: CPT | Performed by: SURGERY

## 2024-01-01 PROCEDURE — 43270 EGD LESION ABLATION: CPT | Performed by: INTERNAL MEDICINE

## 2024-01-01 PROCEDURE — 85027 COMPLETE CBC AUTOMATED: CPT | Performed by: NURSE PRACTITIONER

## 2024-01-01 PROCEDURE — 82140 ASSAY OF AMMONIA: CPT | Performed by: STUDENT IN AN ORGANIZED HEALTH CARE EDUCATION/TRAINING PROGRAM

## 2024-01-01 PROCEDURE — P9017 PLASMA 1 DONOR FRZ W/IN 8 HR: HCPCS

## 2024-01-01 PROCEDURE — 86901 BLOOD TYPING SEROLOGIC RH(D): CPT | Performed by: EMERGENCY MEDICINE

## 2024-01-01 PROCEDURE — 87636 SARSCOV2 & INF A&B AMP PRB: CPT | Performed by: NURSE PRACTITIONER

## 2024-01-01 PROCEDURE — G0103 PSA SCREENING: HCPCS

## 2024-01-01 PROCEDURE — 99222 1ST HOSP IP/OBS MODERATE 55: CPT | Performed by: SURGERY

## 2024-01-01 PROCEDURE — 71045 X-RAY EXAM CHEST 1 VIEW: CPT | Mod: FOREIGN READ | Performed by: RADIOLOGY

## 2024-01-01 PROCEDURE — C9113 INJ PANTOPRAZOLE SODIUM, VIA: HCPCS | Performed by: STUDENT IN AN ORGANIZED HEALTH CARE EDUCATION/TRAINING PROGRAM

## 2024-01-01 PROCEDURE — 2500000005 HC RX 250 GENERAL PHARMACY W/O HCPCS: Performed by: STUDENT IN AN ORGANIZED HEALTH CARE EDUCATION/TRAINING PROGRAM

## 2024-01-01 PROCEDURE — 99222 1ST HOSP IP/OBS MODERATE 55: CPT | Performed by: INTERNAL MEDICINE

## 2024-01-01 PROCEDURE — 2500000002 HC RX 250 W HCPCS SELF ADMINISTERED DRUGS (ALT 637 FOR MEDICARE OP, ALT 636 FOR OP/ED): Mod: MUE

## 2024-01-01 PROCEDURE — 74176 CT ABD & PELVIS W/O CONTRAST: CPT | Performed by: RADIOLOGY

## 2024-01-01 PROCEDURE — 84145 PROCALCITONIN (PCT): CPT | Mod: ELYLAB

## 2024-01-01 PROCEDURE — 94760 N-INVAS EAR/PLS OXIMETRY 1: CPT

## 2024-01-01 PROCEDURE — 3075F SYST BP GE 130 - 139MM HG: CPT | Performed by: NURSE PRACTITIONER

## 2024-01-01 PROCEDURE — 37799 UNLISTED PX VASCULAR SURGERY: CPT

## 2024-01-01 RX ORDER — SODIUM CHLORIDE 9 MG/ML
100 INJECTION, SOLUTION INTRAVENOUS CONTINUOUS
Status: DISCONTINUED | OUTPATIENT
Start: 2024-01-01 | End: 2024-01-01

## 2024-01-01 RX ORDER — SODIUM CHLORIDE 9 MG/ML
50 INJECTION, SOLUTION INTRAVENOUS CONTINUOUS
Status: DISCONTINUED | OUTPATIENT
Start: 2024-01-01 | End: 2024-01-01 | Stop reason: HOSPADM

## 2024-01-01 RX ORDER — ALBUTEROL SULFATE 0.83 MG/ML
20 SOLUTION RESPIRATORY (INHALATION) ONCE
Status: DISCONTINUED | OUTPATIENT
Start: 2024-01-01 | End: 2024-01-01

## 2024-01-01 RX ORDER — CALCIUM CHLORIDE INJECTION 100 MG/ML
INJECTION, SOLUTION INTRAVENOUS
Status: COMPLETED
Start: 2024-01-01 | End: 2024-01-01

## 2024-01-01 RX ORDER — DEXTROSE MONOHYDRATE 100 MG/ML
50 INJECTION, SOLUTION INTRAVENOUS CONTINUOUS
Status: ACTIVE | OUTPATIENT
Start: 2024-01-01 | End: 2024-01-01

## 2024-01-01 RX ORDER — MORPHINE SULFATE 4 MG/ML
4 INJECTION, SOLUTION INTRAMUSCULAR; INTRAVENOUS
Status: DISCONTINUED | OUTPATIENT
Start: 2024-01-01 | End: 2024-01-01 | Stop reason: HOSPADM

## 2024-01-01 RX ORDER — ACETAMINOPHEN 160 MG/5ML
650 SOLUTION ORAL EVERY 4 HOURS PRN
Status: DISCONTINUED | OUTPATIENT
Start: 2024-01-01 | End: 2024-01-01

## 2024-01-01 RX ORDER — IPRATROPIUM BROMIDE AND ALBUTEROL SULFATE 2.5; .5 MG/3ML; MG/3ML
3 SOLUTION RESPIRATORY (INHALATION) 3 TIMES DAILY
Status: DISCONTINUED | OUTPATIENT
Start: 2024-01-01 | End: 2024-01-01

## 2024-01-01 RX ORDER — CALCIUM GLUCONATE 20 MG/ML
2 INJECTION, SOLUTION INTRAVENOUS ONCE
Status: COMPLETED | OUTPATIENT
Start: 2024-01-01 | End: 2024-01-01

## 2024-01-01 RX ORDER — IPRATROPIUM BROMIDE AND ALBUTEROL SULFATE 2.5; .5 MG/3ML; MG/3ML
3 SOLUTION RESPIRATORY (INHALATION) EVERY 4 HOURS PRN
Status: DISCONTINUED | OUTPATIENT
Start: 2024-01-01 | End: 2024-01-01 | Stop reason: HOSPADM

## 2024-01-01 RX ORDER — ALPRAZOLAM 0.5 MG/1
0.5 TABLET ORAL ONCE
Status: COMPLETED | OUTPATIENT
Start: 2024-01-01 | End: 2024-01-01

## 2024-01-01 RX ORDER — DEXTROMETHORPHAN/PSEUDOEPHED 2.5-7.5/.8
DROPS ORAL AS NEEDED
Status: COMPLETED | OUTPATIENT
Start: 2024-01-01 | End: 2024-01-01

## 2024-01-01 RX ORDER — MIDAZOLAM HYDROCHLORIDE 1 MG/ML
2 INJECTION, SOLUTION INTRAMUSCULAR; INTRAVENOUS ONCE
Status: COMPLETED | OUTPATIENT
Start: 2024-01-01 | End: 2024-01-01

## 2024-01-01 RX ORDER — IPRATROPIUM BROMIDE AND ALBUTEROL SULFATE 2.5; .5 MG/3ML; MG/3ML
3 SOLUTION RESPIRATORY (INHALATION)
Status: DISCONTINUED | OUTPATIENT
Start: 2024-01-01 | End: 2024-01-01

## 2024-01-01 RX ORDER — ACETAMINOPHEN 10 MG/ML
1000 INJECTION, SOLUTION INTRAVENOUS EVERY 6 HOURS SCHEDULED
Status: DISCONTINUED | OUTPATIENT
Start: 2024-01-01 | End: 2024-01-01

## 2024-01-01 RX ORDER — NITROGLYCERIN 0.4 MG/1
0.4 TABLET SUBLINGUAL EVERY 5 MIN PRN
Status: DISCONTINUED | OUTPATIENT
Start: 2024-01-01 | End: 2024-01-01

## 2024-01-01 RX ORDER — FUROSEMIDE 10 MG/ML
20 INJECTION INTRAMUSCULAR; INTRAVENOUS ONCE
Status: COMPLETED | OUTPATIENT
Start: 2024-01-01 | End: 2024-01-01

## 2024-01-01 RX ORDER — ONDANSETRON HYDROCHLORIDE 2 MG/ML
4 INJECTION, SOLUTION INTRAVENOUS ONCE
Status: COMPLETED | OUTPATIENT
Start: 2024-01-01 | End: 2024-01-01

## 2024-01-01 RX ORDER — AMLODIPINE BESYLATE 5 MG/1
5 TABLET ORAL DAILY
Status: DISCONTINUED | OUTPATIENT
Start: 2024-01-01 | End: 2024-01-01

## 2024-01-01 RX ORDER — KETAMINE HYDROCHLORIDE 10 MG/ML
INJECTION, SOLUTION INTRAMUSCULAR; INTRAVENOUS CODE/TRAUMA/SEDATION MEDICATION
Status: COMPLETED | OUTPATIENT
Start: 2024-01-01 | End: 2024-01-01

## 2024-01-01 RX ORDER — METHADONE HYDROCHLORIDE 5 MG/1
5 TABLET ORAL EVERY 6 HOURS
Status: DISCONTINUED | OUTPATIENT
Start: 2024-01-01 | End: 2024-01-01

## 2024-01-01 RX ORDER — TRAMADOL HYDROCHLORIDE 50 MG/1
50 TABLET ORAL EVERY 8 HOURS PRN
Status: DISCONTINUED | OUTPATIENT
Start: 2024-01-01 | End: 2024-01-01

## 2024-01-01 RX ORDER — HEPARIN SODIUM 5000 [USP'U]/ML
80 INJECTION, SOLUTION INTRAVENOUS; SUBCUTANEOUS ONCE
Status: COMPLETED | OUTPATIENT
Start: 2024-01-01 | End: 2024-01-01

## 2024-01-01 RX ORDER — FENTANYL CITRATE 50 UG/ML
INJECTION, SOLUTION INTRAMUSCULAR; INTRAVENOUS AS NEEDED
Status: DISCONTINUED | OUTPATIENT
Start: 2024-01-01 | End: 2024-01-01 | Stop reason: HOSPADM

## 2024-01-01 RX ORDER — ROCURONIUM BROMIDE 10 MG/ML
80 INJECTION, SOLUTION INTRAVENOUS ONCE
Status: COMPLETED | OUTPATIENT
Start: 2024-01-01 | End: 2024-01-01

## 2024-01-01 RX ORDER — MORPHINE SULFATE 2 MG/ML
INJECTION, SOLUTION INTRAMUSCULAR; INTRAVENOUS
Status: COMPLETED
Start: 2024-01-01 | End: 2024-01-01

## 2024-01-01 RX ORDER — MORPHINE SULFATE 2 MG/ML
2 INJECTION, SOLUTION INTRAMUSCULAR; INTRAVENOUS ONCE
Status: COMPLETED | OUTPATIENT
Start: 2024-01-01 | End: 2024-01-01

## 2024-01-01 RX ORDER — HEPARIN SODIUM 10000 [USP'U]/100ML
0-4000 INJECTION, SOLUTION INTRAVENOUS CONTINUOUS
Status: DISCONTINUED | OUTPATIENT
Start: 2024-01-01 | End: 2024-01-01

## 2024-01-01 RX ORDER — MAGNESIUM SULFATE HEPTAHYDRATE 40 MG/ML
2 INJECTION, SOLUTION INTRAVENOUS ONCE
Status: COMPLETED | OUTPATIENT
Start: 2024-01-01 | End: 2024-01-01

## 2024-01-01 RX ORDER — NALOXONE HYDROCHLORIDE 1 MG/ML
1 INJECTION INTRAMUSCULAR; INTRAVENOUS; SUBCUTANEOUS AS NEEDED
Status: DISCONTINUED | OUTPATIENT
Start: 2024-01-01 | End: 2024-01-01

## 2024-01-01 RX ORDER — METOPROLOL TARTRATE 25 MG/1
25 TABLET, FILM COATED ORAL 2 TIMES DAILY
Status: DISCONTINUED | OUTPATIENT
Start: 2024-01-01 | End: 2024-01-01

## 2024-01-01 RX ORDER — MORPHINE SULFATE 2 MG/ML
2 INJECTION, SOLUTION INTRAMUSCULAR; INTRAVENOUS
Status: DISCONTINUED | OUTPATIENT
Start: 2024-01-01 | End: 2024-01-01 | Stop reason: HOSPADM

## 2024-01-01 RX ORDER — FENTANYL CITRATE 50 UG/ML
25 INJECTION, SOLUTION INTRAMUSCULAR; INTRAVENOUS
Status: DISCONTINUED | OUTPATIENT
Start: 2024-01-01 | End: 2024-01-01

## 2024-01-01 RX ORDER — FENTANYL CITRATE 50 UG/ML
50 INJECTION, SOLUTION INTRAMUSCULAR; INTRAVENOUS EVERY 2 HOUR PRN
Status: DISCONTINUED | OUTPATIENT
Start: 2024-01-01 | End: 2024-01-01

## 2024-01-01 RX ORDER — HEPARIN SODIUM 5000 [USP'U]/ML
2000-4000 INJECTION, SOLUTION INTRAVENOUS; SUBCUTANEOUS EVERY 4 HOURS PRN
Status: DISCONTINUED | OUTPATIENT
Start: 2024-01-01 | End: 2024-01-01

## 2024-01-01 RX ORDER — IPRATROPIUM BROMIDE AND ALBUTEROL SULFATE 2.5; .5 MG/3ML; MG/3ML
3 SOLUTION RESPIRATORY (INHALATION) EVERY 4 HOURS PRN
Qty: 180 ML | Refills: 11 | Status: SHIPPED | OUTPATIENT
Start: 2024-01-01 | End: 2024-01-01

## 2024-01-01 RX ORDER — ATORVASTATIN CALCIUM 20 MG/1
40 TABLET, FILM COATED ORAL NIGHTLY
Status: DISCONTINUED | OUTPATIENT
Start: 2024-01-01 | End: 2024-01-01

## 2024-01-01 RX ORDER — PANTOPRAZOLE SODIUM 40 MG/10ML
40 INJECTION, POWDER, LYOPHILIZED, FOR SOLUTION INTRAVENOUS DAILY
Status: DISCONTINUED | OUTPATIENT
Start: 2024-01-01 | End: 2024-01-01

## 2024-01-01 RX ORDER — FUROSEMIDE 40 MG/1
20 TABLET ORAL DAILY
Status: DISCONTINUED | OUTPATIENT
Start: 2024-01-01 | End: 2024-01-01

## 2024-01-01 RX ORDER — PROPOFOL 10 MG/ML
5-50 INJECTION, EMULSION INTRAVENOUS CONTINUOUS
Status: DISCONTINUED | OUTPATIENT
Start: 2024-01-01 | End: 2024-01-01

## 2024-01-01 RX ORDER — DEXMEDETOMIDINE HYDROCHLORIDE 4 UG/ML
.1-1.5 INJECTION, SOLUTION INTRAVENOUS CONTINUOUS
Status: DISCONTINUED | OUTPATIENT
Start: 2024-01-01 | End: 2024-01-01

## 2024-01-01 RX ORDER — MORPHINE SULFATE 2 MG/ML
2 INJECTION, SOLUTION INTRAMUSCULAR; INTRAVENOUS
Status: DISCONTINUED | OUTPATIENT
Start: 2024-01-01 | End: 2024-01-01

## 2024-01-01 RX ORDER — MORPHINE SULFATE 4 MG/ML
4 INJECTION, SOLUTION INTRAMUSCULAR; INTRAVENOUS EVERY 6 HOURS
Status: DISCONTINUED | OUTPATIENT
Start: 2024-01-01 | End: 2024-01-01 | Stop reason: HOSPADM

## 2024-01-01 RX ORDER — HYDRALAZINE HYDROCHLORIDE 20 MG/ML
10 INJECTION INTRAMUSCULAR; INTRAVENOUS ONCE
Status: COMPLETED | OUTPATIENT
Start: 2024-01-01 | End: 2024-01-01

## 2024-01-01 RX ORDER — ROCURONIUM BROMIDE 10 MG/ML
INJECTION, SOLUTION INTRAVENOUS
Status: COMPLETED
Start: 2024-01-01 | End: 2024-01-01

## 2024-01-01 RX ORDER — MORPHINE SULFATE 4 MG/ML
4 INJECTION, SOLUTION INTRAMUSCULAR; INTRAVENOUS ONCE
Status: COMPLETED | OUTPATIENT
Start: 2024-01-01 | End: 2024-01-01

## 2024-01-01 RX ORDER — GLYCOPYRROLATE 0.2 MG/ML
0.2 INJECTION INTRAMUSCULAR; INTRAVENOUS EVERY 4 HOURS
Status: DISCONTINUED | OUTPATIENT
Start: 2024-01-01 | End: 2024-01-01 | Stop reason: HOSPADM

## 2024-01-01 RX ORDER — HYDROMORPHONE HYDROCHLORIDE 1 MG/ML
0.6 INJECTION, SOLUTION INTRAMUSCULAR; INTRAVENOUS; SUBCUTANEOUS EVERY 2 HOUR PRN
Status: DISCONTINUED | OUTPATIENT
Start: 2024-01-01 | End: 2024-01-01

## 2024-01-01 RX ORDER — DOCUSATE SODIUM 100 MG/1
100 CAPSULE, LIQUID FILLED ORAL 2 TIMES DAILY
Status: DISCONTINUED | OUTPATIENT
Start: 2024-01-01 | End: 2024-01-01

## 2024-01-01 RX ORDER — ALBUTEROL SULFATE 90 UG/1
2 AEROSOL, METERED RESPIRATORY (INHALATION) EVERY 4 HOURS PRN
Status: DISCONTINUED | OUTPATIENT
Start: 2024-01-01 | End: 2024-01-01

## 2024-01-01 RX ORDER — METOCLOPRAMIDE HYDROCHLORIDE 5 MG/ML
10 INJECTION INTRAMUSCULAR; INTRAVENOUS ONCE
Status: COMPLETED | OUTPATIENT
Start: 2024-01-01 | End: 2024-01-01

## 2024-01-01 RX ORDER — HALOPERIDOL 5 MG/ML
1 INJECTION INTRAMUSCULAR EVERY 4 HOURS PRN
Status: DISCONTINUED | OUTPATIENT
Start: 2024-01-01 | End: 2024-01-01 | Stop reason: HOSPADM

## 2024-01-01 RX ORDER — LORAZEPAM 2 MG/ML
0.5 INJECTION INTRAMUSCULAR
Status: DISCONTINUED | OUTPATIENT
Start: 2024-01-01 | End: 2024-01-01

## 2024-01-01 RX ORDER — MORPHINE SULFATE 2 MG/ML
1 INJECTION, SOLUTION INTRAMUSCULAR; INTRAVENOUS EVERY 4 HOURS PRN
Status: DISCONTINUED | OUTPATIENT
Start: 2024-01-01 | End: 2024-01-01

## 2024-01-01 RX ORDER — LISINOPRIL 5 MG/1
5 TABLET ORAL DAILY
Status: DISCONTINUED | OUTPATIENT
Start: 2024-01-01 | End: 2024-01-01

## 2024-01-01 RX ORDER — MORPHINE SULFATE 4 MG/ML
4 INJECTION, SOLUTION INTRAMUSCULAR; INTRAVENOUS
Status: DISCONTINUED | OUTPATIENT
Start: 2024-01-01 | End: 2024-01-01

## 2024-01-01 RX ORDER — HYDRALAZINE HYDROCHLORIDE 20 MG/ML
10 INJECTION INTRAMUSCULAR; INTRAVENOUS EVERY 6 HOURS PRN
Status: DISCONTINUED | OUTPATIENT
Start: 2024-01-01 | End: 2024-01-01

## 2024-01-01 RX ORDER — CLOPIDOGREL BISULFATE 75 MG/1
75 TABLET ORAL DAILY
Status: DISCONTINUED | OUTPATIENT
Start: 2024-01-01 | End: 2024-01-01

## 2024-01-01 RX ORDER — DEXMEDETOMIDINE HYDROCHLORIDE 4 UG/ML
INJECTION, SOLUTION INTRAVENOUS
Status: COMPLETED
Start: 2024-01-01 | End: 2024-01-01

## 2024-01-01 RX ORDER — IPRATROPIUM BROMIDE AND ALBUTEROL SULFATE 2.5; .5 MG/3ML; MG/3ML
3 SOLUTION RESPIRATORY (INHALATION) EVERY 20 MIN
Status: COMPLETED | OUTPATIENT
Start: 2024-01-01 | End: 2024-01-01

## 2024-01-01 RX ORDER — PANTOPRAZOLE SODIUM 40 MG/10ML
80 INJECTION, POWDER, LYOPHILIZED, FOR SOLUTION INTRAVENOUS ONCE
Status: COMPLETED | OUTPATIENT
Start: 2024-01-01 | End: 2024-01-01

## 2024-01-01 RX ORDER — PANTOPRAZOLE SODIUM 40 MG/1
40 TABLET, DELAYED RELEASE ORAL DAILY
Status: DISCONTINUED | OUTPATIENT
Start: 2024-01-01 | End: 2024-01-01

## 2024-01-01 RX ORDER — LORAZEPAM 2 MG/ML
0.5 INJECTION INTRAMUSCULAR
Status: DISCONTINUED | OUTPATIENT
Start: 2024-01-01 | End: 2024-01-01 | Stop reason: HOSPADM

## 2024-01-01 RX ORDER — THIAMINE HYDROCHLORIDE 100 MG/ML
100 INJECTION, SOLUTION INTRAMUSCULAR; INTRAVENOUS DAILY
Status: COMPLETED | OUTPATIENT
Start: 2024-01-01 | End: 2024-01-01

## 2024-01-01 RX ORDER — FENTANYL CITRATE-0.9 % NACL/PF 10 MCG/ML
25-200 PLASTIC BAG, INJECTION (ML) INTRAVENOUS CONTINUOUS
Status: DISCONTINUED | OUTPATIENT
Start: 2024-01-01 | End: 2024-01-01

## 2024-01-01 RX ORDER — GLYCOPYRROLATE 0.2 MG/ML
0.2 INJECTION INTRAMUSCULAR; INTRAVENOUS EVERY 4 HOURS PRN
Status: DISCONTINUED | OUTPATIENT
Start: 2024-01-01 | End: 2024-01-01

## 2024-01-01 RX ORDER — FENTANYL CITRATE 50 UG/ML
25 INJECTION, SOLUTION INTRAMUSCULAR; INTRAVENOUS ONCE
Status: DISCONTINUED | OUTPATIENT
Start: 2024-01-01 | End: 2024-01-01

## 2024-01-01 RX ORDER — MIDAZOLAM HYDROCHLORIDE 1 MG/ML
INJECTION, SOLUTION INTRAMUSCULAR; INTRAVENOUS
Status: COMPLETED
Start: 2024-01-01 | End: 2024-01-01

## 2024-01-01 RX ORDER — LABETALOL HYDROCHLORIDE 5 MG/ML
20 INJECTION, SOLUTION INTRAVENOUS EVERY 4 HOURS PRN
Status: DISCONTINUED | OUTPATIENT
Start: 2024-01-01 | End: 2024-01-01

## 2024-01-01 RX ORDER — EPINEPHRINE 1 MG/ML
INJECTION, SOLUTION, CONCENTRATE INTRAVENOUS AS NEEDED
Status: COMPLETED | OUTPATIENT
Start: 2024-01-01 | End: 2024-01-01

## 2024-01-01 RX ORDER — LORAZEPAM 2 MG/ML
0.5 INJECTION INTRAMUSCULAR
Refills: 0
Start: 2024-01-01 | End: 2024-01-01

## 2024-01-01 RX ORDER — MORPHINE SULFATE 4 MG/ML
4 INJECTION, SOLUTION INTRAMUSCULAR; INTRAVENOUS EVERY 30 MIN PRN
Status: DISCONTINUED | OUTPATIENT
Start: 2024-01-01 | End: 2024-01-01 | Stop reason: HOSPADM

## 2024-01-01 RX ORDER — SODIUM BICARBONATE 1 MEQ/ML
50 SYRINGE (ML) INTRAVENOUS ONCE
Status: COMPLETED | OUTPATIENT
Start: 2024-01-01 | End: 2024-01-01

## 2024-01-01 RX ORDER — POLYETHYLENE GLYCOL 3350 17 G/17G
17 POWDER, FOR SOLUTION ORAL DAILY
Status: DISCONTINUED | OUTPATIENT
Start: 2024-01-01 | End: 2024-01-01

## 2024-01-01 RX ORDER — IPRATROPIUM BROMIDE AND ALBUTEROL SULFATE 2.5; .5 MG/3ML; MG/3ML
3 SOLUTION RESPIRATORY (INHALATION) EVERY 4 HOURS PRN
Status: DISCONTINUED | OUTPATIENT
Start: 2024-01-01 | End: 2024-01-01

## 2024-01-01 RX ORDER — LORAZEPAM 2 MG/ML
2 INJECTION INTRAMUSCULAR
Status: DISCONTINUED | OUTPATIENT
Start: 2024-01-01 | End: 2024-01-01 | Stop reason: HOSPADM

## 2024-01-01 RX ORDER — DEXTROSE 50 % IN WATER (D50W) INTRAVENOUS SYRINGE
25 ONCE
Status: COMPLETED | OUTPATIENT
Start: 2024-01-01 | End: 2024-01-01

## 2024-01-01 RX ORDER — PANTOPRAZOLE SODIUM 40 MG/10ML
40 INJECTION, POWDER, LYOPHILIZED, FOR SOLUTION INTRAVENOUS 2 TIMES DAILY
Status: DISCONTINUED | OUTPATIENT
Start: 2024-01-01 | End: 2024-01-01

## 2024-01-01 RX ORDER — NAPROXEN SODIUM 220 MG/1
81 TABLET, FILM COATED ORAL DAILY
Status: DISCONTINUED | OUTPATIENT
Start: 2024-01-01 | End: 2024-01-01

## 2024-01-01 RX ORDER — GABAPENTIN 300 MG/1
600 CAPSULE ORAL 2 TIMES DAILY
Status: DISCONTINUED | OUTPATIENT
Start: 2024-01-01 | End: 2024-01-01

## 2024-01-01 RX ORDER — HALOPERIDOL 5 MG/ML
1 INJECTION INTRAMUSCULAR EVERY 4 HOURS PRN
Qty: 1 ML
Start: 2024-01-01 | End: 2024-01-01

## 2024-01-01 RX ORDER — METHADONE HYDROCHLORIDE 5 MG/1
5 TABLET ORAL ONCE
Status: COMPLETED | OUTPATIENT
Start: 2024-01-01 | End: 2024-01-01

## 2024-01-01 RX ORDER — SILODOSIN 8 MG/1
8 CAPSULE ORAL
Status: DISCONTINUED | OUTPATIENT
Start: 2024-01-01 | End: 2024-01-01

## 2024-01-01 RX ORDER — SODIUM CHLORIDE 9 MG/ML
75 INJECTION, SOLUTION INTRAVENOUS CONTINUOUS
Status: ACTIVE | OUTPATIENT
Start: 2024-01-01 | End: 2024-01-01

## 2024-01-01 RX ORDER — MORPHINE SULFATE 2 MG/ML
2 INJECTION, SOLUTION INTRAMUSCULAR; INTRAVENOUS EVERY 4 HOURS PRN
Status: DISCONTINUED | OUTPATIENT
Start: 2024-01-01 | End: 2024-01-01

## 2024-01-01 RX ORDER — FENTANYL CITRATE 50 UG/ML
50 INJECTION, SOLUTION INTRAMUSCULAR; INTRAVENOUS
Status: DISCONTINUED | OUTPATIENT
Start: 2024-01-01 | End: 2024-01-01

## 2024-01-01 RX ORDER — HEPARIN SODIUM 1000 [USP'U]/ML
INJECTION, SOLUTION INTRAVENOUS; SUBCUTANEOUS AS NEEDED
Status: DISCONTINUED | OUTPATIENT
Start: 2024-01-01 | End: 2024-01-01 | Stop reason: HOSPADM

## 2024-01-01 RX ORDER — SODIUM CHLORIDE 9 MG/ML
75 INJECTION, SOLUTION INTRAVENOUS CONTINUOUS
Status: DISCONTINUED | OUTPATIENT
Start: 2024-01-01 | End: 2024-01-01

## 2024-01-01 RX ORDER — FENTANYL CITRATE 50 UG/ML
50 INJECTION, SOLUTION INTRAMUSCULAR; INTRAVENOUS ONCE
Status: COMPLETED | OUTPATIENT
Start: 2024-01-01 | End: 2024-01-01

## 2024-01-01 RX ORDER — BENZONATATE 100 MG/1
100 CAPSULE ORAL 3 TIMES DAILY PRN
Status: DISCONTINUED | OUTPATIENT
Start: 2024-01-01 | End: 2024-01-01

## 2024-01-01 RX ORDER — MIDAZOLAM HYDROCHLORIDE 1 MG/ML
INJECTION INTRAMUSCULAR; INTRAVENOUS AS NEEDED
Status: DISCONTINUED | OUTPATIENT
Start: 2024-01-01 | End: 2024-01-01 | Stop reason: HOSPADM

## 2024-01-01 RX ORDER — METOPROLOL SUCCINATE 50 MG/1
50 TABLET, EXTENDED RELEASE ORAL EVERY EVENING
Status: DISCONTINUED | OUTPATIENT
Start: 2024-01-01 | End: 2024-01-01

## 2024-01-01 RX ORDER — FENTANYL CITRATE 50 UG/ML
25 INJECTION, SOLUTION INTRAMUSCULAR; INTRAVENOUS ONCE
Status: COMPLETED | OUTPATIENT
Start: 2024-01-01 | End: 2024-01-01

## 2024-01-01 RX ORDER — ACETAMINOPHEN 650 MG/1
650 SUPPOSITORY RECTAL EVERY 6 HOURS PRN
Status: DISCONTINUED | OUTPATIENT
Start: 2024-01-01 | End: 2024-01-01 | Stop reason: HOSPADM

## 2024-01-01 RX ORDER — SODIUM CHLORIDE 9 MG/ML
100 INJECTION, SOLUTION INTRAVENOUS CONTINUOUS
Status: CANCELLED | OUTPATIENT
Start: 2024-01-01

## 2024-01-01 RX ORDER — CEFAZOLIN SODIUM 1 G/50ML
1 SOLUTION INTRAVENOUS EVERY 8 HOURS
Status: COMPLETED | OUTPATIENT
Start: 2024-01-01 | End: 2024-01-01

## 2024-01-01 RX ORDER — MORPHINE SULFATE 2 MG/ML
2 INJECTION, SOLUTION INTRAMUSCULAR; INTRAVENOUS EVERY 4 HOURS PRN
Refills: 0
Start: 2024-01-01 | End: 2024-01-01

## 2024-01-01 RX ORDER — HYDRALAZINE HYDROCHLORIDE 20 MG/ML
INJECTION INTRAMUSCULAR; INTRAVENOUS
Status: DISPENSED
Start: 2024-01-01 | End: 2024-01-01

## 2024-01-01 RX ORDER — FENTANYL CITRATE 50 UG/ML
INJECTION, SOLUTION INTRAMUSCULAR; INTRAVENOUS
Status: DISPENSED
Start: 2024-01-01 | End: 2024-01-01

## 2024-01-01 RX ORDER — LIDOCAINE HYDROCHLORIDE 20 MG/ML
INJECTION, SOLUTION INFILTRATION; PERINEURAL AS NEEDED
Status: DISCONTINUED | OUTPATIENT
Start: 2024-01-01 | End: 2024-01-01 | Stop reason: HOSPADM

## 2024-01-01 RX ORDER — GLYCOPYRROLATE 0.2 MG/ML
0.2 INJECTION INTRAMUSCULAR; INTRAVENOUS EVERY 4 HOURS PRN
Qty: 1 ML
Start: 2024-01-01 | End: 2024-01-01

## 2024-01-01 RX ORDER — MIDAZOLAM HYDROCHLORIDE 1 MG/ML
5 INJECTION, SOLUTION INTRAMUSCULAR; INTRAVENOUS
Status: DISCONTINUED | OUTPATIENT
Start: 2024-01-01 | End: 2024-01-01

## 2024-01-01 RX ORDER — NOREPINEPHRINE BITARTRATE/D5W 8 MG/250ML
PLASTIC BAG, INJECTION (ML) INTRAVENOUS
Status: COMPLETED
Start: 2024-01-01 | End: 2024-01-01

## 2024-01-01 RX ORDER — HEPARIN SODIUM 10000 [USP'U]/100ML
0-4500 INJECTION, SOLUTION INTRAVENOUS CONTINUOUS
Status: DISCONTINUED | OUTPATIENT
Start: 2024-01-01 | End: 2024-01-01

## 2024-01-01 RX ORDER — HEPARIN SODIUM 5000 [USP'U]/ML
5000 INJECTION, SOLUTION INTRAVENOUS; SUBCUTANEOUS EVERY 8 HOURS SCHEDULED
Status: DISCONTINUED | OUTPATIENT
Start: 2024-01-01 | End: 2024-01-01

## 2024-01-01 RX ORDER — ACETAMINOPHEN 325 MG/1
650 TABLET ORAL EVERY 4 HOURS PRN
Status: DISCONTINUED | OUTPATIENT
Start: 2024-01-01 | End: 2024-01-01

## 2024-01-01 RX ORDER — FUROSEMIDE 40 MG/1
40 TABLET ORAL DAILY
Status: DISCONTINUED | OUTPATIENT
Start: 2024-01-01 | End: 2024-01-01

## 2024-01-01 RX ORDER — MIDAZOLAM HYDROCHLORIDE 1 MG/ML
INJECTION, SOLUTION INTRAMUSCULAR; INTRAVENOUS CODE/TRAUMA/SEDATION MEDICATION
Status: COMPLETED | OUTPATIENT
Start: 2024-01-01 | End: 2024-01-01

## 2024-01-01 RX ORDER — NOREPINEPHRINE BITARTRATE/D5W 8 MG/250ML
.01-1 PLASTIC BAG, INJECTION (ML) INTRAVENOUS CONTINUOUS
Status: DISCONTINUED | OUTPATIENT
Start: 2024-01-01 | End: 2024-01-01

## 2024-01-01 RX ORDER — PROPOFOL 10 MG/ML
INJECTION, EMULSION INTRAVENOUS
Status: COMPLETED
Start: 2024-01-01 | End: 2024-01-01

## 2024-01-01 RX ORDER — ASPIRIN 325 MG
325 TABLET ORAL ONCE
Status: DISCONTINUED | OUTPATIENT
Start: 2024-01-01 | End: 2024-01-01

## 2024-01-01 RX ADMIN — DEXTROSE MONOHYDRATE 25 G: 25 INJECTION, SOLUTION INTRAVENOUS at 07:45

## 2024-01-01 RX ADMIN — DOCUSATE SODIUM 100 MG: 100 CAPSULE, LIQUID FILLED ORAL at 20:29

## 2024-01-01 RX ADMIN — MORPHINE SULFATE 4 MG: 4 INJECTION, SOLUTION INTRAMUSCULAR; INTRAVENOUS at 01:14

## 2024-01-01 RX ADMIN — HALOPERIDOL LACTATE 1 MG: 5 INJECTION, SOLUTION INTRAMUSCULAR at 16:17

## 2024-01-01 RX ADMIN — METHYLPREDNISOLONE SODIUM SUCCINATE 40 MG: 40 INJECTION, POWDER, LYOPHILIZED, FOR SOLUTION INTRAMUSCULAR; INTRAVENOUS at 07:44

## 2024-01-01 RX ADMIN — MORPHINE SULFATE 4 MG: 4 INJECTION, SOLUTION INTRAMUSCULAR; INTRAVENOUS at 10:01

## 2024-01-01 RX ADMIN — HEPARIN SODIUM 3450 UNITS: 5000 INJECTION INTRAVENOUS; SUBCUTANEOUS at 10:43

## 2024-01-01 RX ADMIN — MIDAZOLAM 2 MG: 1 INJECTION INTRAMUSCULAR; INTRAVENOUS at 09:47

## 2024-01-01 RX ADMIN — DEXMEDETOMIDINE HYDROCHLORIDE 1 MCG/KG/HR: 400 INJECTION INTRAVENOUS at 06:58

## 2024-01-01 RX ADMIN — LORAZEPAM 0.5 MG: 2 INJECTION INTRAMUSCULAR; INTRAVENOUS at 16:49

## 2024-01-01 RX ADMIN — Medication 40 PERCENT: at 07:53

## 2024-01-01 RX ADMIN — KETAMINE HYDROCHLORIDE 100 MG: 10 INJECTION INTRAMUSCULAR; INTRAVENOUS at 09:44

## 2024-01-01 RX ADMIN — IPRATROPIUM BROMIDE AND ALBUTEROL SULFATE 3 ML: 2.5; .5 SOLUTION RESPIRATORY (INHALATION) at 07:18

## 2024-01-01 RX ADMIN — FENTANYL CITRATE 25 MCG: 50 INJECTION, SOLUTION INTRAMUSCULAR; INTRAVENOUS at 16:49

## 2024-01-01 RX ADMIN — MORPHINE SULFATE 2 MG: 2 INJECTION, SOLUTION INTRAMUSCULAR; INTRAVENOUS at 15:42

## 2024-01-01 RX ADMIN — DOCUSATE SODIUM 100 MG: 100 CAPSULE, LIQUID FILLED ORAL at 12:24

## 2024-01-01 RX ADMIN — HEPARIN SODIUM 5000 UNITS: 5000 INJECTION INTRAVENOUS; SUBCUTANEOUS at 15:05

## 2024-01-01 RX ADMIN — LABETALOL HYDROCHLORIDE 20 MG: 5 INJECTION, SOLUTION INTRAVENOUS at 18:34

## 2024-01-01 RX ADMIN — DEXMEDETOMIDINE HYDROCHLORIDE 1.5 MCG/KG/HR: 400 INJECTION INTRAVENOUS at 03:28

## 2024-01-01 RX ADMIN — IPRATROPIUM BROMIDE AND ALBUTEROL SULFATE 3 ML: 2.5; .5 SOLUTION RESPIRATORY (INHALATION) at 20:55

## 2024-01-01 RX ADMIN — HYDRALAZINE HYDROCHLORIDE 10 MG: 20 INJECTION INTRAMUSCULAR; INTRAVENOUS at 17:14

## 2024-01-01 RX ADMIN — CEFAZOLIN SODIUM 1 G: 1 INJECTION, SOLUTION INTRAVENOUS at 23:42

## 2024-01-01 RX ADMIN — SODIUM CHLORIDE 50 ML/HR: 9 INJECTION, SOLUTION INTRAVENOUS at 09:47

## 2024-01-01 RX ADMIN — ROCURONIUM BROMIDE 80 MG: 10 INJECTION, SOLUTION INTRAVENOUS at 09:44

## 2024-01-01 RX ADMIN — GLYCOPYRROLATE 0.2 MG: 0.2 INJECTION, SOLUTION INTRAMUSCULAR; INTRAVENOUS at 16:23

## 2024-01-01 RX ADMIN — GABAPENTIN 600 MG: 300 CAPSULE ORAL at 12:24

## 2024-01-01 RX ADMIN — POLYETHYLENE GLYCOL 3350 17 G: 17 POWDER, FOR SOLUTION ORAL at 15:01

## 2024-01-01 RX ADMIN — SODIUM PHOSPHATE, MONOBASIC, MONOHYDRATE AND SODIUM PHOSPHATE, DIBASIC, ANHYDROUS 21 MMOL: 276; 142 INJECTION, SOLUTION INTRAVENOUS at 07:41

## 2024-01-01 RX ADMIN — DEXMEDETOMIDINE HYDROCHLORIDE 0.49 MCG/KG/HR: 400 INJECTION INTRAVENOUS at 23:03

## 2024-01-01 RX ADMIN — GLYCOPYRROLATE 0.2 MG: 0.2 INJECTION, SOLUTION INTRAMUSCULAR; INTRAVENOUS at 04:14

## 2024-01-01 RX ADMIN — FENTANYL CITRATE 75 MCG/HR: 0.05 INJECTION, SOLUTION INTRAMUSCULAR; INTRAVENOUS at 10:52

## 2024-01-01 RX ADMIN — DEXMEDETOMIDINE HYDROCHLORIDE 0.48 MCG/KG/HR: 400 INJECTION INTRAVENOUS at 13:12

## 2024-01-01 RX ADMIN — METHADONE HYDROCHLORIDE 5 MG: 5 TABLET ORAL at 06:57

## 2024-01-01 RX ADMIN — PROPOFOL 50 MCG/KG/MIN: 10 INJECTION, EMULSION INTRAVENOUS at 00:46

## 2024-01-01 RX ADMIN — DEXMEDETOMIDINE HYDROCHLORIDE 0.2 MCG/KG/HR: 400 INJECTION INTRAVENOUS at 21:30

## 2024-01-01 RX ADMIN — HEPARIN SODIUM 5000 UNITS: 5000 INJECTION INTRAVENOUS; SUBCUTANEOUS at 13:41

## 2024-01-01 RX ADMIN — CALCIUM GLUCONATE 2 G: 20 INJECTION, SOLUTION INTRAVENOUS at 07:52

## 2024-01-01 RX ADMIN — SODIUM BICARBONATE 50 MEQ: 84 INJECTION INTRAVENOUS at 11:18

## 2024-01-01 RX ADMIN — ACETAMINOPHEN 650 MG: 325 TABLET ORAL at 01:24

## 2024-01-01 RX ADMIN — MORPHINE SULFATE 2 MG: 2 INJECTION, SOLUTION INTRAMUSCULAR; INTRAVENOUS at 04:05

## 2024-01-01 RX ADMIN — IPRATROPIUM BROMIDE AND ALBUTEROL SULFATE 3 ML: 2.5; .5 SOLUTION RESPIRATORY (INHALATION) at 13:26

## 2024-01-01 RX ADMIN — METHADONE HYDROCHLORIDE 5 MG: 5 TABLET ORAL at 23:16

## 2024-01-01 RX ADMIN — PROPOFOL 5 MCG/KG/MIN: 10 INJECTION, EMULSION INTRAVENOUS at 10:39

## 2024-01-01 RX ADMIN — DEXMEDETOMIDINE HYDROCHLORIDE 1.25 MCG/KG/HR: 400 INJECTION INTRAVENOUS at 08:28

## 2024-01-01 RX ADMIN — METHYLPREDNISOLONE SODIUM SUCCINATE 40 MG: 40 INJECTION, POWDER, LYOPHILIZED, FOR SOLUTION INTRAMUSCULAR; INTRAVENOUS at 21:03

## 2024-01-01 RX ADMIN — SODIUM CHLORIDE 50 ML/HR: 9 INJECTION, SOLUTION INTRAVENOUS at 18:07

## 2024-01-01 RX ADMIN — GLYCOPYRROLATE 0.2 MG: 0.2 INJECTION, SOLUTION INTRAMUSCULAR; INTRAVENOUS at 04:57

## 2024-01-01 RX ADMIN — METHADONE HYDROCHLORIDE 5 MG: 5 TABLET ORAL at 17:00

## 2024-01-01 RX ADMIN — GLYCOPYRROLATE 0.2 MG: 0.2 INJECTION, SOLUTION INTRAMUSCULAR; INTRAVENOUS at 00:52

## 2024-01-01 RX ADMIN — FENTANYL CITRATE 25 MCG: 50 INJECTION, SOLUTION INTRAMUSCULAR; INTRAVENOUS at 13:51

## 2024-01-01 RX ADMIN — MORPHINE SULFATE 2 MG: 2 INJECTION, SOLUTION INTRAMUSCULAR; INTRAVENOUS at 23:00

## 2024-01-01 RX ADMIN — HEPARIN SODIUM 5000 UNITS: 5000 INJECTION INTRAVENOUS; SUBCUTANEOUS at 13:50

## 2024-01-01 RX ADMIN — SILODOSIN 8 MG: 8 CAPSULE ORAL at 08:16

## 2024-01-01 RX ADMIN — GLYCOPYRROLATE 0.2 MG: 0.2 INJECTION, SOLUTION INTRAMUSCULAR; INTRAVENOUS at 08:42

## 2024-01-01 RX ADMIN — IPRATROPIUM BROMIDE AND ALBUTEROL SULFATE 3 ML: 2.5; .5 SOLUTION RESPIRATORY (INHALATION) at 20:41

## 2024-01-01 RX ADMIN — MORPHINE SULFATE 2 MG: 2 INJECTION, SOLUTION INTRAMUSCULAR; INTRAVENOUS at 13:12

## 2024-01-01 RX ADMIN — LORAZEPAM 0.5 MG: 2 INJECTION INTRAMUSCULAR; INTRAVENOUS at 13:29

## 2024-01-01 RX ADMIN — Medication 8000 MCG: at 08:40

## 2024-01-01 RX ADMIN — FENTANYL CITRATE 50 MCG: 50 INJECTION, SOLUTION INTRAMUSCULAR; INTRAVENOUS at 16:17

## 2024-01-01 RX ADMIN — LORAZEPAM 0.5 MG: 2 INJECTION INTRAMUSCULAR; INTRAVENOUS at 22:34

## 2024-01-01 RX ADMIN — SODIUM CHLORIDE 50 ML/HR: 9 INJECTION, SOLUTION INTRAVENOUS at 23:25

## 2024-01-01 RX ADMIN — SODIUM CHLORIDE 50 ML/HR: 9 INJECTION, SOLUTION INTRAVENOUS at 04:04

## 2024-01-01 RX ADMIN — METHYLPREDNISOLONE SODIUM SUCCINATE 40 MG: 40 INJECTION, POWDER, LYOPHILIZED, FOR SOLUTION INTRAMUSCULAR; INTRAVENOUS at 10:18

## 2024-01-01 RX ADMIN — GABAPENTIN 600 MG: 300 CAPSULE ORAL at 08:05

## 2024-01-01 RX ADMIN — ALBUTEROL SULFATE 2 PUFF: 90 AEROSOL, METERED RESPIRATORY (INHALATION) at 02:00

## 2024-01-01 RX ADMIN — CALCIUM CHLORIDE 1 G: 100 INJECTION INTRAVENOUS; INTRAVENTRICULAR at 10:35

## 2024-01-01 RX ADMIN — GABAPENTIN 600 MG: 300 CAPSULE ORAL at 08:16

## 2024-01-01 RX ADMIN — BENZONATATE 100 MG: 100 CAPSULE ORAL at 11:21

## 2024-01-01 RX ADMIN — THIAMINE HYDROCHLORIDE 100 MG: 100 INJECTION, SOLUTION INTRAMUSCULAR; INTRAVENOUS at 08:27

## 2024-01-01 RX ADMIN — METHYLPREDNISOLONE SODIUM SUCCINATE 40 MG: 40 INJECTION, POWDER, LYOPHILIZED, FOR SOLUTION INTRAMUSCULAR; INTRAVENOUS at 20:55

## 2024-01-01 RX ADMIN — ATORVASTATIN CALCIUM 40 MG: 20 TABLET ORAL at 20:29

## 2024-01-01 RX ADMIN — METOPROLOL TARTRATE 25 MG: 25 TABLET, FILM COATED ORAL at 20:50

## 2024-01-01 RX ADMIN — MORPHINE SULFATE 2 MG: 2 INJECTION, SOLUTION INTRAMUSCULAR; INTRAVENOUS at 20:16

## 2024-01-01 RX ADMIN — DEXMEDETOMIDINE HYDROCHLORIDE 0.95 MCG/KG/HR: 4 INJECTION, SOLUTION INTRAVENOUS at 06:21

## 2024-01-01 RX ADMIN — ACETAMINOPHEN 650 MG: 650 SUPPOSITORY RECTAL at 18:28

## 2024-01-01 RX ADMIN — MAGNESIUM SULFATE HEPTAHYDRATE 2 G: 40 INJECTION, SOLUTION INTRAVENOUS at 06:14

## 2024-01-01 RX ADMIN — GLYCOPYRROLATE 0.2 MG: 0.2 INJECTION, SOLUTION INTRAMUSCULAR; INTRAVENOUS at 16:17

## 2024-01-01 RX ADMIN — LORAZEPAM 0.5 MG: 2 INJECTION INTRAMUSCULAR; INTRAVENOUS at 04:10

## 2024-01-01 RX ADMIN — MORPHINE SULFATE 2 MG: 2 INJECTION, SOLUTION INTRAMUSCULAR; INTRAVENOUS at 05:06

## 2024-01-01 RX ADMIN — METHADONE HYDROCHLORIDE 5 MG: 5 TABLET ORAL at 18:42

## 2024-01-01 RX ADMIN — LORAZEPAM 0.5 MG: 2 INJECTION INTRAMUSCULAR; INTRAVENOUS at 06:18

## 2024-01-01 RX ADMIN — METHADONE HYDROCHLORIDE 5 MG: 5 TABLET ORAL at 05:45

## 2024-01-01 RX ADMIN — GLYCOPYRROLATE 0.2 MG: 0.2 INJECTION, SOLUTION INTRAMUSCULAR; INTRAVENOUS at 20:24

## 2024-01-01 RX ADMIN — ATORVASTATIN CALCIUM 40 MG: 20 TABLET ORAL at 20:50

## 2024-01-01 RX ADMIN — METHADONE HYDROCHLORIDE 5 MG: 5 TABLET ORAL at 23:50

## 2024-01-01 RX ADMIN — POLYETHYLENE GLYCOL 3350 17 G: 17 POWDER, FOR SOLUTION ORAL at 08:04

## 2024-01-01 RX ADMIN — GLYCOPYRROLATE 0.2 MG: 0.2 INJECTION, SOLUTION INTRAMUSCULAR; INTRAVENOUS at 07:58

## 2024-01-01 RX ADMIN — PANTOPRAZOLE SODIUM 40 MG: 40 INJECTION, POWDER, FOR SOLUTION INTRAVENOUS at 21:03

## 2024-01-01 RX ADMIN — METHADONE HYDROCHLORIDE 5 MG: 5 TABLET ORAL at 05:24

## 2024-01-01 RX ADMIN — GLYCOPYRROLATE 0.2 MG: 0.2 INJECTION, SOLUTION INTRAMUSCULAR; INTRAVENOUS at 01:01

## 2024-01-01 RX ADMIN — ATORVASTATIN CALCIUM 40 MG: 20 TABLET ORAL at 21:30

## 2024-01-01 RX ADMIN — IPRATROPIUM BROMIDE AND ALBUTEROL SULFATE 3 ML: 2.5; .5 SOLUTION RESPIRATORY (INHALATION) at 13:35

## 2024-01-01 RX ADMIN — FENTANYL CITRATE 125 MCG/HR: 0.05 INJECTION, SOLUTION INTRAMUSCULAR; INTRAVENOUS at 02:07

## 2024-01-01 RX ADMIN — PANTOPRAZOLE SODIUM 40 MG: 40 INJECTION, POWDER, FOR SOLUTION INTRAVENOUS at 08:27

## 2024-01-01 RX ADMIN — Medication: at 00:16

## 2024-01-01 RX ADMIN — METHADONE HYDROCHLORIDE 5 MG: 5 TABLET ORAL at 16:51

## 2024-01-01 RX ADMIN — MORPHINE SULFATE 2 MG: 2 INJECTION, SOLUTION INTRAMUSCULAR; INTRAVENOUS at 16:45

## 2024-01-01 RX ADMIN — IPRATROPIUM BROMIDE AND ALBUTEROL SULFATE 3 ML: 2.5; .5 SOLUTION RESPIRATORY (INHALATION) at 13:16

## 2024-01-01 RX ADMIN — PANTOPRAZOLE SODIUM 40 MG: 40 INJECTION, POWDER, FOR SOLUTION INTRAVENOUS at 20:25

## 2024-01-01 RX ADMIN — METHADONE HYDROCHLORIDE 5 MG: 5 TABLET ORAL at 23:19

## 2024-01-01 RX ADMIN — THIAMINE HYDROCHLORIDE 100 MG: 100 INJECTION, SOLUTION INTRAMUSCULAR; INTRAVENOUS at 08:05

## 2024-01-01 RX ADMIN — ACETAMINOPHEN 1000 MG: 10 INJECTION, SOLUTION INTRAVENOUS at 00:46

## 2024-01-01 RX ADMIN — IPRATROPIUM BROMIDE AND ALBUTEROL SULFATE 3 ML: 2.5; .5 SOLUTION RESPIRATORY (INHALATION) at 08:11

## 2024-01-01 RX ADMIN — GLYCOPYRROLATE 0.2 MG: 0.2 INJECTION, SOLUTION INTRAMUSCULAR; INTRAVENOUS at 21:08

## 2024-01-01 RX ADMIN — CEFAZOLIN SODIUM 1 G: 1 INJECTION, SOLUTION INTRAVENOUS at 06:45

## 2024-01-01 RX ADMIN — METHADONE HYDROCHLORIDE 5 MG: 5 TABLET ORAL at 16:45

## 2024-01-01 RX ADMIN — METHADONE HYDROCHLORIDE 5 MG: 5 TABLET ORAL at 23:04

## 2024-01-01 RX ADMIN — GLYCOPYRROLATE 0.2 MG: 0.2 INJECTION, SOLUTION INTRAMUSCULAR; INTRAVENOUS at 16:45

## 2024-01-01 RX ADMIN — FENTANYL CITRATE 25 MCG/HR: 0.05 INJECTION, SOLUTION INTRAMUSCULAR; INTRAVENOUS at 08:28

## 2024-01-01 RX ADMIN — HEPARIN SODIUM 5000 UNITS: 5000 INJECTION INTRAVENOUS; SUBCUTANEOUS at 21:21

## 2024-01-01 RX ADMIN — IPRATROPIUM BROMIDE AND ALBUTEROL SULFATE 3 ML: 2.5; .5 SOLUTION RESPIRATORY (INHALATION) at 18:09

## 2024-01-01 RX ADMIN — METHADONE HYDROCHLORIDE 5 MG: 5 TABLET ORAL at 11:14

## 2024-01-01 RX ADMIN — MORPHINE SULFATE 4 MG: 4 INJECTION, SOLUTION INTRAMUSCULAR; INTRAVENOUS at 04:14

## 2024-01-01 RX ADMIN — DIBASIC SODIUM PHOSPHATE, MONOBASIC POTASSIUM PHOSPHATE AND MONOBASIC SODIUM PHOSPHATE 250 MG: 852; 155; 130 TABLET ORAL at 16:40

## 2024-01-01 RX ADMIN — METHADONE HYDROCHLORIDE 5 MG: 5 TABLET ORAL at 04:47

## 2024-01-01 RX ADMIN — ACETAMINOPHEN 650 MG: 325 TABLET ORAL at 20:28

## 2024-01-01 RX ADMIN — FUROSEMIDE 20 MG: 10 INJECTION, SOLUTION INTRAMUSCULAR; INTRAVENOUS at 18:24

## 2024-01-01 RX ADMIN — GLYCOPYRROLATE 0.2 MG: 0.2 INJECTION, SOLUTION INTRAMUSCULAR; INTRAVENOUS at 09:47

## 2024-01-01 RX ADMIN — PANTOPRAZOLE SODIUM 40 MG: 40 INJECTION, POWDER, FOR SOLUTION INTRAVENOUS at 07:44

## 2024-01-01 RX ADMIN — MORPHINE SULFATE 4 MG: 4 INJECTION, SOLUTION INTRAMUSCULAR; INTRAVENOUS at 23:21

## 2024-01-01 RX ADMIN — EPINEPHRINE 1 MG: 1 INJECTION, SOLUTION, CONCENTRATE INTRAVENOUS at 16:22

## 2024-01-01 RX ADMIN — MORPHINE SULFATE 4 MG: 4 INJECTION, SOLUTION INTRAMUSCULAR; INTRAVENOUS at 23:04

## 2024-01-01 RX ADMIN — ONDANSETRON 4 MG: 2 INJECTION INTRAMUSCULAR; INTRAVENOUS at 08:04

## 2024-01-01 RX ADMIN — CEFAZOLIN SODIUM 1 G: 1 INJECTION, SOLUTION INTRAVENOUS at 15:12

## 2024-01-01 RX ADMIN — METOPROLOL TARTRATE 25 MG: 25 TABLET, FILM COATED ORAL at 08:04

## 2024-01-01 RX ADMIN — Medication 30 PERCENT: at 19:55

## 2024-01-01 RX ADMIN — ASPIRIN 81 MG: 81 TABLET, CHEWABLE ORAL at 15:06

## 2024-01-01 RX ADMIN — GABAPENTIN 600 MG: 300 CAPSULE ORAL at 21:31

## 2024-01-01 RX ADMIN — METHYLPREDNISOLONE SODIUM SUCCINATE 40 MG: 40 INJECTION, POWDER, FOR SOLUTION INTRAMUSCULAR; INTRAVENOUS at 10:13

## 2024-01-01 RX ADMIN — NOREPINEPHRINE BITARTRATE 8000 MCG: 8 INJECTION, SOLUTION INTRAVENOUS at 08:40

## 2024-01-01 RX ADMIN — SODIUM ZIRCONIUM CYCLOSILICATE 10 G: 10 POWDER, FOR SUSPENSION ORAL at 07:58

## 2024-01-01 RX ADMIN — METHADONE HYDROCHLORIDE 5 MG: 5 TABLET ORAL at 11:57

## 2024-01-01 RX ADMIN — PANTOPRAZOLE SODIUM 40 MG: 40 INJECTION, POWDER, FOR SOLUTION INTRAVENOUS at 08:32

## 2024-01-01 RX ADMIN — ATORVASTATIN CALCIUM 40 MG: 20 TABLET ORAL at 20:36

## 2024-01-01 RX ADMIN — DEXMEDETOMIDINE HYDROCHLORIDE 1.5 MCG/KG/HR: 400 INJECTION INTRAVENOUS at 08:46

## 2024-01-01 RX ADMIN — LISINOPRIL 5 MG: 5 TABLET ORAL at 12:24

## 2024-01-01 RX ADMIN — METHYLPREDNISOLONE SODIUM SUCCINATE 40 MG: 40 INJECTION, POWDER, LYOPHILIZED, FOR SOLUTION INTRAMUSCULAR; INTRAVENOUS at 21:30

## 2024-01-01 RX ADMIN — GLYCOPYRROLATE 0.2 MG: 0.2 INJECTION, SOLUTION INTRAMUSCULAR; INTRAVENOUS at 16:22

## 2024-01-01 RX ADMIN — FENTANYL CITRATE 175 MCG/HR: 0.05 INJECTION, SOLUTION INTRAMUSCULAR; INTRAVENOUS at 01:20

## 2024-01-01 RX ADMIN — HYDROMORPHONE HYDROCHLORIDE 0.5 MG: 1 INJECTION, SOLUTION INTRAMUSCULAR; INTRAVENOUS; SUBCUTANEOUS at 08:04

## 2024-01-01 RX ADMIN — ALBUTEROL SULFATE 20 MG: 2.5 SOLUTION RESPIRATORY (INHALATION) at 07:43

## 2024-01-01 RX ADMIN — GLYCOPYRROLATE 0.2 MG: 0.2 INJECTION, SOLUTION INTRAMUSCULAR; INTRAVENOUS at 01:14

## 2024-01-01 RX ADMIN — MORPHINE SULFATE 2 MG: 2 INJECTION, SOLUTION INTRAMUSCULAR; INTRAVENOUS at 04:57

## 2024-01-01 RX ADMIN — ASPIRIN 81 MG: 81 TABLET, CHEWABLE ORAL at 08:04

## 2024-01-01 RX ADMIN — PANTOPRAZOLE SODIUM 40 MG: 40 INJECTION, POWDER, FOR SOLUTION INTRAVENOUS at 21:30

## 2024-01-01 RX ADMIN — DIBASIC SODIUM PHOSPHATE, MONOBASIC POTASSIUM PHOSPHATE AND MONOBASIC SODIUM PHOSPHATE 250 MG: 852; 155; 130 TABLET ORAL at 13:12

## 2024-01-01 RX ADMIN — METHADONE HYDROCHLORIDE 5 MG: 5 TABLET ORAL at 11:30

## 2024-01-01 RX ADMIN — METHYLPREDNISOLONE SODIUM SUCCINATE 40 MG: 40 INJECTION, POWDER, LYOPHILIZED, FOR SOLUTION INTRAMUSCULAR; INTRAVENOUS at 11:21

## 2024-01-01 RX ADMIN — GLYCOPYRROLATE 0.2 MG: 0.2 INJECTION, SOLUTION INTRAMUSCULAR; INTRAVENOUS at 05:33

## 2024-01-01 RX ADMIN — MORPHINE SULFATE 4 MG: 4 INJECTION, SOLUTION INTRAMUSCULAR; INTRAVENOUS at 05:33

## 2024-01-01 RX ADMIN — VASOPRESSIN 0.03 UNITS/MIN: 0.2 INJECTION INTRAVENOUS at 10:40

## 2024-01-01 RX ADMIN — CLOPIDOGREL 75 MG: 75 TABLET ORAL at 10:19

## 2024-01-01 RX ADMIN — SODIUM CHLORIDE 75 ML/HR: 9 INJECTION, SOLUTION INTRAVENOUS at 14:57

## 2024-01-01 RX ADMIN — METHADONE HYDROCHLORIDE 5 MG: 5 TABLET ORAL at 06:07

## 2024-01-01 RX ADMIN — MORPHINE SULFATE 2 MG: 2 INJECTION, SOLUTION INTRAMUSCULAR; INTRAVENOUS at 03:27

## 2024-01-01 RX ADMIN — HYDRALAZINE HYDROCHLORIDE 10 MG: 20 INJECTION INTRAMUSCULAR; INTRAVENOUS at 17:20

## 2024-01-01 RX ADMIN — SODIUM PHOSPHATE, MONOBASIC, MONOHYDRATE AND SODIUM PHOSPHATE, DIBASIC, ANHYDROUS 15 MMOL: 276; 142 INJECTION, SOLUTION INTRAVENOUS at 07:51

## 2024-01-01 RX ADMIN — DEXMEDETOMIDINE HYDROCHLORIDE 0.2 MCG/KG/HR: 400 INJECTION INTRAVENOUS at 17:18

## 2024-01-01 RX ADMIN — DEXMEDETOMIDINE HYDROCHLORIDE 0.2 MCG/KG/HR: 400 INJECTION INTRAVENOUS at 23:42

## 2024-01-01 RX ADMIN — METHADONE HYDROCHLORIDE 5 MG: 5 TABLET ORAL at 13:12

## 2024-01-01 RX ADMIN — MORPHINE SULFATE 2 MG: 2 INJECTION, SOLUTION INTRAMUSCULAR; INTRAVENOUS at 11:16

## 2024-01-01 RX ADMIN — GABAPENTIN 600 MG: 300 CAPSULE ORAL at 20:25

## 2024-01-01 RX ADMIN — ACETAMINOPHEN 1000 MG: 10 INJECTION, SOLUTION INTRAVENOUS at 12:12

## 2024-01-01 RX ADMIN — HYDRALAZINE HYDROCHLORIDE 10 MG: 20 INJECTION INTRAMUSCULAR; INTRAVENOUS at 17:37

## 2024-01-01 RX ADMIN — METOPROLOL TARTRATE 25 MG: 25 TABLET, FILM COATED ORAL at 11:14

## 2024-01-01 RX ADMIN — IPRATROPIUM BROMIDE AND ALBUTEROL SULFATE 3 ML: 2.5; .5 SOLUTION RESPIRATORY (INHALATION) at 11:20

## 2024-01-01 RX ADMIN — THIAMINE HYDROCHLORIDE 100 MG: 100 INJECTION, SOLUTION INTRAMUSCULAR; INTRAVENOUS at 12:00

## 2024-01-01 RX ADMIN — HEPARIN SODIUM 5000 UNITS: 5000 INJECTION INTRAVENOUS; SUBCUTANEOUS at 21:20

## 2024-01-01 RX ADMIN — POLYETHYLENE GLYCOL 3350 17 G: 17 POWDER, FOR SOLUTION ORAL at 08:15

## 2024-01-01 RX ADMIN — MORPHINE SULFATE 4 MG: 4 INJECTION, SOLUTION INTRAMUSCULAR; INTRAVENOUS at 15:07

## 2024-01-01 RX ADMIN — GLYCOPYRROLATE 0.2 MG: 0.2 INJECTION, SOLUTION INTRAMUSCULAR; INTRAVENOUS at 08:00

## 2024-01-01 RX ADMIN — HALOPERIDOL LACTATE 1 MG: 5 INJECTION, SOLUTION INTRAMUSCULAR at 09:39

## 2024-01-01 RX ADMIN — METHYLPREDNISOLONE SODIUM SUCCINATE 40 MG: 40 INJECTION, POWDER, LYOPHILIZED, FOR SOLUTION INTRAMUSCULAR; INTRAVENOUS at 08:49

## 2024-01-01 RX ADMIN — CEFAZOLIN SODIUM 1 G: 1 INJECTION, SOLUTION INTRAVENOUS at 06:18

## 2024-01-01 RX ADMIN — MAGNESIUM SULFATE HEPTAHYDRATE 2 G: 40 INJECTION, SOLUTION INTRAVENOUS at 13:12

## 2024-01-01 RX ADMIN — IOHEXOL 100 ML: 350 INJECTION, SOLUTION INTRAVENOUS at 12:29

## 2024-01-01 RX ADMIN — METHADONE HYDROCHLORIDE 5 MG: 5 TABLET ORAL at 23:51

## 2024-01-01 RX ADMIN — MIDAZOLAM 2 MG: 1 INJECTION INTRAMUSCULAR; INTRAVENOUS at 09:30

## 2024-01-01 RX ADMIN — IPRATROPIUM BROMIDE AND ALBUTEROL SULFATE 3 ML: 2.5; .5 SOLUTION RESPIRATORY (INHALATION) at 07:13

## 2024-01-01 RX ADMIN — SODIUM BICARBONATE 50 MEQ: 84 INJECTION INTRAVENOUS at 07:58

## 2024-01-01 RX ADMIN — METHYLPREDNISOLONE SODIUM SUCCINATE 40 MG: 40 INJECTION, POWDER, LYOPHILIZED, FOR SOLUTION INTRAMUSCULAR; INTRAVENOUS at 20:28

## 2024-01-01 RX ADMIN — DEXMEDETOMIDINE HYDROCHLORIDE 0.49 MCG/KG/HR: 400 INJECTION INTRAVENOUS at 18:49

## 2024-01-01 RX ADMIN — ALPRAZOLAM 0.5 MG: 0.5 TABLET ORAL at 03:07

## 2024-01-01 RX ADMIN — INSULIN HUMAN 10 UNITS: 100 INJECTION, SOLUTION PARENTERAL at 07:45

## 2024-01-01 RX ADMIN — GLYCOPYRROLATE 0.2 MG: 0.2 INJECTION, SOLUTION INTRAMUSCULAR; INTRAVENOUS at 11:48

## 2024-01-01 RX ADMIN — PROPOFOL 20 MCG/KG/MIN: 10 INJECTION, EMULSION INTRAVENOUS at 16:40

## 2024-01-01 RX ADMIN — GLYCOPYRROLATE 0.2 MG: 0.2 INJECTION, SOLUTION INTRAMUSCULAR; INTRAVENOUS at 12:04

## 2024-01-01 RX ADMIN — PANTOPRAZOLE SODIUM 40 MG: 40 INJECTION, POWDER, FOR SOLUTION INTRAVENOUS at 08:49

## 2024-01-01 RX ADMIN — MORPHINE SULFATE 4 MG: 4 INJECTION, SOLUTION INTRAMUSCULAR; INTRAVENOUS at 08:45

## 2024-01-01 RX ADMIN — Medication 40 PERCENT: at 11:00

## 2024-01-01 RX ADMIN — HEPARIN SODIUM 5000 UNITS: 5000 INJECTION INTRAVENOUS; SUBCUTANEOUS at 13:08

## 2024-01-01 RX ADMIN — SODIUM CHLORIDE 50 ML/HR: 9 INJECTION, SOLUTION INTRAVENOUS at 21:10

## 2024-01-01 RX ADMIN — FENTANYL CITRATE 50 MCG/HR: 0.05 INJECTION, SOLUTION INTRAMUSCULAR; INTRAVENOUS at 18:23

## 2024-01-01 RX ADMIN — PANTOPRAZOLE SODIUM 40 MG: 40 INJECTION, POWDER, FOR SOLUTION INTRAVENOUS at 08:04

## 2024-01-01 RX ADMIN — FUROSEMIDE 20 MG: 10 INJECTION, SOLUTION INTRAMUSCULAR; INTRAVENOUS at 08:15

## 2024-01-01 RX ADMIN — HEPARIN SODIUM 5000 UNITS: 5000 INJECTION INTRAVENOUS; SUBCUTANEOUS at 06:07

## 2024-01-01 RX ADMIN — IPRATROPIUM BROMIDE AND ALBUTEROL SULFATE 3 ML: 2.5; .5 SOLUTION RESPIRATORY (INHALATION) at 07:47

## 2024-01-01 RX ADMIN — BENZONATATE 100 MG: 100 CAPSULE ORAL at 20:29

## 2024-01-01 RX ADMIN — IPRATROPIUM BROMIDE AND ALBUTEROL SULFATE 3 ML: 2.5; .5 SOLUTION RESPIRATORY (INHALATION) at 07:38

## 2024-01-01 RX ADMIN — PANTOPRAZOLE SODIUM 40 MG: 40 INJECTION, POWDER, FOR SOLUTION INTRAVENOUS at 00:45

## 2024-01-01 RX ADMIN — FENTANYL CITRATE 50 MCG: 50 INJECTION, SOLUTION INTRAMUSCULAR; INTRAVENOUS at 22:34

## 2024-01-01 RX ADMIN — SILODOSIN 8 MG: 8 CAPSULE ORAL at 08:04

## 2024-01-01 RX ADMIN — GLYCOPYRROLATE 0.2 MG: 0.2 INJECTION, SOLUTION INTRAMUSCULAR; INTRAVENOUS at 04:04

## 2024-01-01 RX ADMIN — IPRATROPIUM BROMIDE AND ALBUTEROL SULFATE 3 ML: 2.5; .5 SOLUTION RESPIRATORY (INHALATION) at 20:38

## 2024-01-01 RX ADMIN — PROPOFOL 50 MCG/KG/MIN: 10 INJECTION, EMULSION INTRAVENOUS at 03:57

## 2024-01-01 RX ADMIN — FENTANYL CITRATE 25 MCG: 50 INJECTION, SOLUTION INTRAMUSCULAR; INTRAVENOUS at 11:25

## 2024-01-01 RX ADMIN — GLYCOPYRROLATE 0.2 MG: 0.2 INJECTION, SOLUTION INTRAMUSCULAR; INTRAVENOUS at 20:04

## 2024-01-01 RX ADMIN — DEXTROSE MONOHYDRATE 50 ML/HR: 100 INJECTION, SOLUTION INTRAVENOUS at 07:45

## 2024-01-01 RX ADMIN — METHYLPREDNISOLONE SODIUM SUCCINATE 40 MG: 40 INJECTION, POWDER, LYOPHILIZED, FOR SOLUTION INTRAMUSCULAR; INTRAVENOUS at 20:25

## 2024-01-01 RX ADMIN — IPRATROPIUM BROMIDE AND ALBUTEROL SULFATE 3 ML: 2.5; .5 SOLUTION RESPIRATORY (INHALATION) at 13:14

## 2024-01-01 RX ADMIN — Medication 100 MG: at 09:35

## 2024-01-01 RX ADMIN — HEPARIN SODIUM 5000 UNITS: 5000 INJECTION INTRAVENOUS; SUBCUTANEOUS at 20:28

## 2024-01-01 RX ADMIN — METOPROLOL SUCCINATE 50 MG: 50 TABLET, EXTENDED RELEASE ORAL at 20:29

## 2024-01-01 RX ADMIN — CLOPIDOGREL 75 MG: 75 TABLET ORAL at 12:24

## 2024-01-01 RX ADMIN — IPRATROPIUM BROMIDE AND ALBUTEROL SULFATE 3 ML: 2.5; .5 SOLUTION RESPIRATORY (INHALATION) at 19:55

## 2024-01-01 RX ADMIN — CEFAZOLIN SODIUM 1 G: 1 INJECTION, SOLUTION INTRAVENOUS at 07:00

## 2024-01-01 RX ADMIN — MORPHINE SULFATE 2 MG: 2 INJECTION, SOLUTION INTRAMUSCULAR; INTRAVENOUS at 00:54

## 2024-01-01 RX ADMIN — DEXMEDETOMIDINE HYDROCHLORIDE 1.25 MCG/KG/HR: 400 INJECTION INTRAVENOUS at 00:14

## 2024-01-01 RX ADMIN — FUROSEMIDE 20 MG: 10 INJECTION, SOLUTION INTRAMUSCULAR; INTRAVENOUS at 08:04

## 2024-01-01 RX ADMIN — Medication: at 20:28

## 2024-01-01 RX ADMIN — HYDRALAZINE HYDROCHLORIDE 10 MG: 20 INJECTION INTRAMUSCULAR; INTRAVENOUS at 16:41

## 2024-01-01 RX ADMIN — GLYCOPYRROLATE 0.2 MG: 0.2 INJECTION, SOLUTION INTRAMUSCULAR; INTRAVENOUS at 01:27

## 2024-01-01 RX ADMIN — LISINOPRIL 5 MG: 5 TABLET ORAL at 10:19

## 2024-01-01 RX ADMIN — IPRATROPIUM BROMIDE AND ALBUTEROL SULFATE 3 ML: 2.5; .5 SOLUTION RESPIRATORY (INHALATION) at 19:38

## 2024-01-01 RX ADMIN — LISINOPRIL 5 MG: 5 TABLET ORAL at 09:00

## 2024-01-01 RX ADMIN — LORAZEPAM 2 MG: 2 INJECTION INTRAMUSCULAR; INTRAVENOUS at 20:04

## 2024-01-01 RX ADMIN — HYDROMORPHONE HYDROCHLORIDE 0.2 MG: 0.2 INJECTION, SOLUTION INTRAMUSCULAR; INTRAVENOUS; SUBCUTANEOUS at 03:37

## 2024-01-01 RX ADMIN — LORAZEPAM 2 MG: 2 INJECTION INTRAMUSCULAR; INTRAVENOUS at 11:36

## 2024-01-01 RX ADMIN — ASPIRIN 81 MG: 81 TABLET, CHEWABLE ORAL at 10:19

## 2024-01-01 RX ADMIN — HEPARIN SODIUM 5000 UNITS: 5000 INJECTION INTRAVENOUS; SUBCUTANEOUS at 07:50

## 2024-01-01 RX ADMIN — MORPHINE SULFATE 2 MG: 2 INJECTION, SOLUTION INTRAMUSCULAR; INTRAVENOUS at 16:34

## 2024-01-01 RX ADMIN — DEXMEDETOMIDINE HYDROCHLORIDE 0.47 MCG/KG/HR: 400 INJECTION INTRAVENOUS at 10:18

## 2024-01-01 RX ADMIN — PROTAMINE SULFATE 30 MG: 10 INJECTION, SOLUTION INTRAVENOUS at 07:52

## 2024-01-01 RX ADMIN — MORPHINE SULFATE 4 MG: 4 INJECTION, SOLUTION INTRAMUSCULAR; INTRAVENOUS at 17:57

## 2024-01-01 RX ADMIN — MORPHINE SULFATE 2 MG: 2 INJECTION, SOLUTION INTRAMUSCULAR; INTRAVENOUS at 12:11

## 2024-01-01 RX ADMIN — DEXMEDETOMIDINE HYDROCHLORIDE 1.5 MCG/KG/HR: 400 INJECTION INTRAVENOUS at 04:23

## 2024-01-01 RX ADMIN — HYDRALAZINE HYDROCHLORIDE 10 MG: 20 INJECTION INTRAMUSCULAR; INTRAVENOUS at 03:56

## 2024-01-01 RX ADMIN — HYDRALAZINE HYDROCHLORIDE 10 MG: 20 INJECTION INTRAMUSCULAR; INTRAVENOUS at 15:37

## 2024-01-01 RX ADMIN — MORPHINE SULFATE 2 MG: 2 INJECTION, SOLUTION INTRAMUSCULAR; INTRAVENOUS at 08:42

## 2024-01-01 RX ADMIN — ACETAMINOPHEN 1000 MG: 10 INJECTION, SOLUTION INTRAVENOUS at 18:23

## 2024-01-01 RX ADMIN — IPRATROPIUM BROMIDE AND ALBUTEROL SULFATE 3 ML: 2.5; .5 SOLUTION RESPIRATORY (INHALATION) at 00:58

## 2024-01-01 RX ADMIN — PROPOFOL 50 MCG/KG/MIN: 10 INJECTION, EMULSION INTRAVENOUS at 17:43

## 2024-01-01 RX ADMIN — GLYCOPYRROLATE 0.2 MG: 0.2 INJECTION, SOLUTION INTRAMUSCULAR; INTRAVENOUS at 12:10

## 2024-01-01 RX ADMIN — Medication 40 PERCENT: at 18:07

## 2024-01-01 RX ADMIN — LORAZEPAM 0.5 MG: 2 INJECTION INTRAMUSCULAR; INTRAVENOUS at 11:05

## 2024-01-01 RX ADMIN — LORAZEPAM 2 MG: 2 INJECTION INTRAMUSCULAR; INTRAVENOUS at 15:26

## 2024-01-01 RX ADMIN — MORPHINE SULFATE 4 MG: 4 INJECTION, SOLUTION INTRAMUSCULAR; INTRAVENOUS at 04:39

## 2024-01-01 RX ADMIN — PANTOPRAZOLE SODIUM 80 MG: 40 INJECTION, POWDER, FOR SOLUTION INTRAVENOUS at 07:57

## 2024-01-01 RX ADMIN — PANTOPRAZOLE SODIUM 40 MG: 40 INJECTION, POWDER, FOR SOLUTION INTRAVENOUS at 08:18

## 2024-01-01 RX ADMIN — Medication 40 PERCENT: at 07:47

## 2024-01-01 RX ADMIN — HYDROMORPHONE HYDROCHLORIDE 0.2 MG: 0.2 INJECTION, SOLUTION INTRAMUSCULAR; INTRAVENOUS; SUBCUTANEOUS at 17:36

## 2024-01-01 RX ADMIN — IPRATROPIUM BROMIDE AND ALBUTEROL SULFATE 3 ML: 2.5; .5 SOLUTION RESPIRATORY (INHALATION) at 21:14

## 2024-01-01 RX ADMIN — MORPHINE SULFATE 2 MG: 2 INJECTION, SOLUTION INTRAMUSCULAR; INTRAVENOUS at 22:20

## 2024-01-01 RX ADMIN — SODIUM CHLORIDE 50 ML/HR: 9 INJECTION, SOLUTION INTRAVENOUS at 13:24

## 2024-01-01 RX ADMIN — IPRATROPIUM BROMIDE AND ALBUTEROL SULFATE 3 ML: 2.5; .5 SOLUTION RESPIRATORY (INHALATION) at 18:10

## 2024-01-01 RX ADMIN — LORAZEPAM 2 MG: 2 INJECTION INTRAMUSCULAR; INTRAVENOUS at 04:54

## 2024-01-01 RX ADMIN — MORPHINE SULFATE 2 MG: 2 INJECTION, SOLUTION INTRAMUSCULAR; INTRAVENOUS at 22:19

## 2024-01-01 RX ADMIN — MORPHINE SULFATE 4 MG: 4 INJECTION, SOLUTION INTRAMUSCULAR; INTRAVENOUS at 20:24

## 2024-01-01 RX ADMIN — MORPHINE SULFATE 2 MG: 2 INJECTION, SOLUTION INTRAMUSCULAR; INTRAVENOUS at 09:48

## 2024-01-01 RX ADMIN — SODIUM CHLORIDE 100 ML/HR: 9 INJECTION, SOLUTION INTRAVENOUS at 08:56

## 2024-01-01 RX ADMIN — LORAZEPAM 0.5 MG: 2 INJECTION INTRAMUSCULAR; INTRAVENOUS at 01:17

## 2024-01-01 RX ADMIN — DEXMEDETOMIDINE HYDROCHLORIDE 1.25 MCG/KG/HR: 400 INJECTION INTRAVENOUS at 15:06

## 2024-01-01 RX ADMIN — IPRATROPIUM BROMIDE AND ALBUTEROL SULFATE 3 ML: 2.5; .5 SOLUTION RESPIRATORY (INHALATION) at 09:16

## 2024-01-01 RX ADMIN — ATORVASTATIN CALCIUM 40 MG: 20 TABLET ORAL at 20:25

## 2024-01-01 RX ADMIN — GABAPENTIN 600 MG: 300 CAPSULE ORAL at 10:19

## 2024-01-01 RX ADMIN — MAGNESIUM SULFATE HEPTAHYDRATE 2 G: 40 INJECTION, SOLUTION INTRAVENOUS at 08:17

## 2024-01-01 RX ADMIN — GLYCOPYRROLATE 0.2 MG: 0.2 INJECTION, SOLUTION INTRAMUSCULAR; INTRAVENOUS at 20:16

## 2024-01-01 RX ADMIN — HEPARIN SODIUM 775 UNITS/HR: 10000 INJECTION, SOLUTION INTRAVENOUS at 10:41

## 2024-01-01 RX ADMIN — FENTANYL CITRATE 125 MCG/HR: 0.05 INJECTION, SOLUTION INTRAMUSCULAR; INTRAVENOUS at 07:40

## 2024-01-01 RX ADMIN — FENTANYL CITRATE 50 MCG: 50 INJECTION, SOLUTION INTRAMUSCULAR; INTRAVENOUS at 04:11

## 2024-01-01 RX ADMIN — CEFAZOLIN SODIUM 1 G: 1 INJECTION, SOLUTION INTRAVENOUS at 23:19

## 2024-01-01 RX ADMIN — GABAPENTIN 600 MG: 300 CAPSULE ORAL at 20:50

## 2024-01-01 RX ADMIN — MIDAZOLAM 5 MG: 1 INJECTION INTRAMUSCULAR; INTRAVENOUS at 14:49

## 2024-01-01 RX ADMIN — MORPHINE SULFATE 1 MG: 2 INJECTION, SOLUTION INTRAMUSCULAR; INTRAVENOUS at 10:01

## 2024-01-01 RX ADMIN — MIDAZOLAM HYDROCHLORIDE 2 MG: 1 INJECTION, SOLUTION INTRAMUSCULAR; INTRAVENOUS at 09:30

## 2024-01-01 RX ADMIN — PANTOPRAZOLE SODIUM 40 MG: 40 INJECTION, POWDER, FOR SOLUTION INTRAVENOUS at 14:03

## 2024-01-01 RX ADMIN — MORPHINE SULFATE 4 MG: 4 INJECTION, SOLUTION INTRAMUSCULAR; INTRAVENOUS at 09:18

## 2024-01-01 RX ADMIN — GLYCOPYRROLATE 0.2 MG: 0.2 INJECTION, SOLUTION INTRAMUSCULAR; INTRAVENOUS at 13:18

## 2024-01-01 RX ADMIN — IPRATROPIUM BROMIDE AND ALBUTEROL SULFATE 3 ML: 2.5; .5 SOLUTION RESPIRATORY (INHALATION) at 18:07

## 2024-01-01 RX ADMIN — FENTANYL CITRATE 50 MCG: 50 INJECTION, SOLUTION INTRAMUSCULAR; INTRAVENOUS at 20:37

## 2024-01-01 RX ADMIN — FENTANYL CITRATE 50 MCG: 50 INJECTION, SOLUTION INTRAMUSCULAR; INTRAVENOUS at 03:51

## 2024-01-01 RX ADMIN — MORPHINE SULFATE 2 MG: 2 INJECTION, SOLUTION INTRAMUSCULAR; INTRAVENOUS at 01:04

## 2024-01-01 RX ADMIN — POLYETHYLENE GLYCOL 3350 17 G: 17 POWDER, FOR SOLUTION ORAL at 09:00

## 2024-01-01 RX ADMIN — PANTOPRAZOLE SODIUM 40 MG: 40 INJECTION, POWDER, FOR SOLUTION INTRAVENOUS at 20:36

## 2024-01-01 RX ADMIN — LISINOPRIL 5 MG: 5 TABLET ORAL at 08:37

## 2024-01-01 RX ADMIN — AMLODIPINE BESYLATE 5 MG: 5 TABLET ORAL at 12:24

## 2024-01-01 RX ADMIN — Medication 40 PERCENT: at 13:00

## 2024-01-01 RX ADMIN — ASPIRIN 81 MG: 81 TABLET, CHEWABLE ORAL at 08:16

## 2024-01-01 RX ADMIN — IPRATROPIUM BROMIDE AND ALBUTEROL SULFATE 3 ML: 2.5; .5 SOLUTION RESPIRATORY (INHALATION) at 12:37

## 2024-01-01 RX ADMIN — FENTANYL CITRATE 175 MCG/HR: 0.05 INJECTION, SOLUTION INTRAMUSCULAR; INTRAVENOUS at 18:11

## 2024-01-01 RX ADMIN — ASPIRIN 81 MG: 81 TABLET, CHEWABLE ORAL at 12:24

## 2024-01-01 RX ADMIN — FENTANYL CITRATE 25 MCG/HR: 0.05 INJECTION, SOLUTION INTRAMUSCULAR; INTRAVENOUS at 10:43

## 2024-01-01 RX ADMIN — AMLODIPINE BESYLATE 5 MG: 5 TABLET ORAL at 10:19

## 2024-01-01 RX ADMIN — HYDRALAZINE HYDROCHLORIDE 10 MG: 20 INJECTION INTRAMUSCULAR; INTRAVENOUS at 23:19

## 2024-01-01 RX ADMIN — IPRATROPIUM BROMIDE AND ALBUTEROL SULFATE 3 ML: 2.5; .5 SOLUTION RESPIRATORY (INHALATION) at 07:15

## 2024-01-01 RX ADMIN — FUROSEMIDE 20 MG: 40 TABLET ORAL at 12:24

## 2024-01-01 RX ADMIN — HEPARIN SODIUM 5000 UNITS: 5000 INJECTION INTRAVENOUS; SUBCUTANEOUS at 21:31

## 2024-01-01 RX ADMIN — METOPROLOL TARTRATE 25 MG: 25 TABLET, FILM COATED ORAL at 20:36

## 2024-01-01 RX ADMIN — METOPROLOL TARTRATE 25 MG: 25 TABLET, FILM COATED ORAL at 08:16

## 2024-01-01 RX ADMIN — MORPHINE SULFATE 4 MG: 4 INJECTION, SOLUTION INTRAMUSCULAR; INTRAVENOUS at 18:43

## 2024-01-01 RX ADMIN — PROPOFOL 45 MCG/KG/MIN: 10 INJECTION, EMULSION INTRAVENOUS at 10:44

## 2024-01-01 RX ADMIN — CALCIUM CHLORIDE 1 G: 100 INJECTION INTRAVENOUS; INTRAVENTRICULAR at 09:30

## 2024-01-01 RX ADMIN — MORPHINE SULFATE 4 MG: 4 INJECTION, SOLUTION INTRAMUSCULAR; INTRAVENOUS at 17:32

## 2024-01-01 RX ADMIN — MORPHINE SULFATE 4 MG: 4 INJECTION, SOLUTION INTRAMUSCULAR; INTRAVENOUS at 13:41

## 2024-01-01 RX ADMIN — SIMETHICONE 40 MG: 20 EMULSION ORAL at 14:34

## 2024-01-01 RX ADMIN — MORPHINE SULFATE 4 MG: 4 INJECTION, SOLUTION INTRAMUSCULAR; INTRAVENOUS at 12:20

## 2024-01-01 RX ADMIN — CEFAZOLIN SODIUM 1 G: 1 INJECTION, SOLUTION INTRAVENOUS at 00:48

## 2024-01-01 RX ADMIN — GABAPENTIN 600 MG: 300 CAPSULE ORAL at 20:36

## 2024-01-01 RX ADMIN — SODIUM PHOSPHATE, MONOBASIC, MONOHYDRATE AND SODIUM PHOSPHATE, DIBASIC, ANHYDROUS 15 MMOL: 276; 142 INJECTION, SOLUTION INTRAVENOUS at 08:37

## 2024-01-01 RX ADMIN — CALCIUM CHLORIDE 1 G: 100 INJECTION INTRAVENOUS; INTRAVENTRICULAR at 10:34

## 2024-01-01 RX ADMIN — GLYCOPYRROLATE 0.2 MG: 0.2 INJECTION, SOLUTION INTRAMUSCULAR; INTRAVENOUS at 23:16

## 2024-01-01 RX ADMIN — FENTANYL CITRATE 175 MCG/HR: 0.05 INJECTION, SOLUTION INTRAMUSCULAR; INTRAVENOUS at 17:43

## 2024-01-01 RX ADMIN — IPRATROPIUM BROMIDE AND ALBUTEROL SULFATE 3 ML: 2.5; .5 SOLUTION RESPIRATORY (INHALATION) at 08:05

## 2024-01-01 RX ADMIN — PANTOPRAZOLE SODIUM 40 MG: 40 TABLET, DELAYED RELEASE ORAL at 12:24

## 2024-01-01 RX ADMIN — HEPARIN SODIUM 5000 UNITS: 5000 INJECTION INTRAVENOUS; SUBCUTANEOUS at 05:02

## 2024-01-01 RX ADMIN — HEPARIN SODIUM 500 UNITS/HR: 10000 INJECTION, SOLUTION INTRAVENOUS at 11:40

## 2024-01-01 RX ADMIN — AMLODIPINE BESYLATE 5 MG: 5 TABLET ORAL at 08:59

## 2024-01-01 RX ADMIN — GABAPENTIN 600 MG: 300 CAPSULE ORAL at 08:27

## 2024-01-01 RX ADMIN — IPRATROPIUM BROMIDE AND ALBUTEROL SULFATE 3 ML: 2.5; .5 SOLUTION RESPIRATORY (INHALATION) at 16:43

## 2024-01-01 RX ADMIN — PANTOPRAZOLE SODIUM 40 MG: 40 INJECTION, POWDER, FOR SOLUTION INTRAVENOUS at 20:50

## 2024-01-01 RX ADMIN — METOPROLOL TARTRATE 25 MG: 25 TABLET, FILM COATED ORAL at 21:30

## 2024-01-01 RX ADMIN — IPRATROPIUM BROMIDE AND ALBUTEROL SULFATE 3 ML: 2.5; .5 SOLUTION RESPIRATORY (INHALATION) at 14:16

## 2024-01-01 RX ADMIN — Medication: at 06:48

## 2024-01-01 RX ADMIN — CEFAZOLIN SODIUM 1 G: 1 INJECTION, SOLUTION INTRAVENOUS at 15:37

## 2024-01-01 RX ADMIN — METHYLPREDNISOLONE SODIUM SUCCINATE 40 MG: 40 INJECTION, POWDER, LYOPHILIZED, FOR SOLUTION INTRAMUSCULAR; INTRAVENOUS at 07:57

## 2024-01-01 RX ADMIN — HEPARIN SODIUM 5000 UNITS: 5000 INJECTION INTRAVENOUS; SUBCUTANEOUS at 11:22

## 2024-01-01 RX ADMIN — IPRATROPIUM BROMIDE AND ALBUTEROL SULFATE 3 ML: 2.5; .5 SOLUTION RESPIRATORY (INHALATION) at 14:10

## 2024-01-01 RX ADMIN — CEFAZOLIN SODIUM 1 G: 1 INJECTION, SOLUTION INTRAVENOUS at 16:24

## 2024-01-01 RX ADMIN — Medication 55 L/MIN: at 13:49

## 2024-01-01 RX ADMIN — FUROSEMIDE 40 MG: 40 TABLET ORAL at 10:19

## 2024-01-01 RX ADMIN — IPRATROPIUM BROMIDE AND ALBUTEROL SULFATE 3 ML: 2.5; .5 SOLUTION RESPIRATORY (INHALATION) at 20:28

## 2024-01-01 RX ADMIN — IPRATROPIUM BROMIDE AND ALBUTEROL SULFATE 3 ML: 2.5; .5 SOLUTION RESPIRATORY (INHALATION) at 13:30

## 2024-01-01 RX ADMIN — METOCLOPRAMIDE HYDROCHLORIDE 10 MG: 5 INJECTION INTRAMUSCULAR; INTRAVENOUS at 10:45

## 2024-01-01 RX ADMIN — GABAPENTIN 600 MG: 300 CAPSULE ORAL at 20:29

## 2024-01-01 RX ADMIN — DEXMEDETOMIDINE HYDROCHLORIDE 0.2 MCG/KG/HR: 4 INJECTION, SOLUTION INTRAVENOUS at 17:18

## 2024-01-01 SDOH — SOCIAL STABILITY: SOCIAL INSECURITY: HAS ANYONE EVER THREATENED TO HURT YOUR FAMILY OR YOUR PETS?: NO

## 2024-01-01 SDOH — SOCIAL STABILITY: SOCIAL INSECURITY: HAVE YOU HAD THOUGHTS OF HARMING ANYONE ELSE?: NO

## 2024-01-01 SDOH — SOCIAL STABILITY: SOCIAL INSECURITY: ARE YOU OR HAVE YOU BEEN THREATENED OR ABUSED PHYSICALLY, EMOTIONALLY, OR SEXUALLY BY ANYONE?: NO

## 2024-01-01 SDOH — SOCIAL STABILITY: SOCIAL INSECURITY: ABUSE: ADULT

## 2024-01-01 SDOH — SOCIAL STABILITY: SOCIAL INSECURITY: DOES ANYONE TRY TO KEEP YOU FROM HAVING/CONTACTING OTHER FRIENDS OR DOING THINGS OUTSIDE YOUR HOME?: NO

## 2024-01-01 SDOH — SOCIAL STABILITY: SOCIAL INSECURITY: ARE THERE ANY APPARENT SIGNS OF INJURIES/BEHAVIORS THAT COULD BE RELATED TO ABUSE/NEGLECT?: NO

## 2024-01-01 SDOH — SOCIAL STABILITY: SOCIAL INSECURITY: DO YOU FEEL UNSAFE GOING BACK TO THE PLACE WHERE YOU ARE LIVING?: NO

## 2024-01-01 SDOH — SOCIAL STABILITY: SOCIAL INSECURITY: DO YOU FEEL ANYONE HAS EXPLOITED OR TAKEN ADVANTAGE OF YOU FINANCIALLY OR OF YOUR PERSONAL PROPERTY?: NO

## 2024-01-01 SDOH — SOCIAL STABILITY: SOCIAL INSECURITY: WERE YOU ABLE TO COMPLETE ALL THE BEHAVIORAL HEALTH SCREENINGS?: YES

## 2024-01-01 ASSESSMENT — ENCOUNTER SYMPTOMS
WEAKNESS: 1
HEMATOLOGIC/LYMPHATIC NEGATIVE: 1
PALPITATIONS: 0
PSYCHIATRIC NEGATIVE: 1
GASTROINTESTINAL NEGATIVE: 1
MUSCULOSKELETAL NEGATIVE: 1
SHORTNESS OF BREATH: 1
LIGHT-HEADEDNESS: 0
ABDOMINAL PAIN: 1
EYES NEGATIVE: 1
PALPITATIONS: 0
WHEEZING: 1
FATIGUE: 1
NEUROLOGICAL NEGATIVE: 1
ALLERGIC/IMMUNOLOGIC NEGATIVE: 1
RESPIRATORY NEGATIVE: 1
PSYCHIATRIC NEGATIVE: 1
SHORTNESS OF BREATH: 0
CHEST TIGHTNESS: 0
EYES NEGATIVE: 1
ENDOCRINE NEGATIVE: 1
LIGHT-HEADEDNESS: 0
DIZZINESS: 0
CONSTITUTIONAL NEGATIVE: 1
CARDIOVASCULAR NEGATIVE: 1
CHEST TIGHTNESS: 1
ENDOCRINE NEGATIVE: 1
ALLERGIC/IMMUNOLOGIC NEGATIVE: 1
DIZZINESS: 0

## 2024-01-01 ASSESSMENT — PAIN - FUNCTIONAL ASSESSMENT
PAIN_FUNCTIONAL_ASSESSMENT: CPOT (CRITICAL CARE PAIN OBSERVATION TOOL)
PAIN_FUNCTIONAL_ASSESSMENT: 0-10
PAIN_FUNCTIONAL_ASSESSMENT: CPOT (CRITICAL CARE PAIN OBSERVATION TOOL)
PAIN_FUNCTIONAL_ASSESSMENT: CPOT (CRITICAL CARE PAIN OBSERVATION TOOL)
PAIN_FUNCTIONAL_ASSESSMENT: UNABLE TO SELF-REPORT
PAIN_FUNCTIONAL_ASSESSMENT: CPOT (CRITICAL CARE PAIN OBSERVATION TOOL)
PAIN_FUNCTIONAL_ASSESSMENT: 0-10
PAIN_FUNCTIONAL_ASSESSMENT: CPOT (CRITICAL CARE PAIN OBSERVATION TOOL)
PAIN_FUNCTIONAL_ASSESSMENT: 0-10
PAIN_FUNCTIONAL_ASSESSMENT: CPOT (CRITICAL CARE PAIN OBSERVATION TOOL)
PAIN_FUNCTIONAL_ASSESSMENT: 0-10
PAIN_FUNCTIONAL_ASSESSMENT: CPOT (CRITICAL CARE PAIN OBSERVATION TOOL)
PAIN_FUNCTIONAL_ASSESSMENT: CPOT (CRITICAL CARE PAIN OBSERVATION TOOL)
PAIN_FUNCTIONAL_ASSESSMENT: WONG-BAKER FACES
PAIN_FUNCTIONAL_ASSESSMENT: CPOT (CRITICAL CARE PAIN OBSERVATION TOOL)
PAIN_FUNCTIONAL_ASSESSMENT: CPOT (CRITICAL CARE PAIN OBSERVATION TOOL)
PAIN_FUNCTIONAL_ASSESSMENT: UNABLE TO SELF-REPORT
PAIN_FUNCTIONAL_ASSESSMENT: CPOT (CRITICAL CARE PAIN OBSERVATION TOOL)
PAIN_FUNCTIONAL_ASSESSMENT: 0-10
PAIN_FUNCTIONAL_ASSESSMENT: CPOT (CRITICAL CARE PAIN OBSERVATION TOOL)
PAIN_FUNCTIONAL_ASSESSMENT: 0-10
PAIN_FUNCTIONAL_ASSESSMENT: CPOT (CRITICAL CARE PAIN OBSERVATION TOOL)
PAIN_FUNCTIONAL_ASSESSMENT: 0-10
PAIN_FUNCTIONAL_ASSESSMENT: UNABLE TO SELF-REPORT
PAIN_FUNCTIONAL_ASSESSMENT: CPOT (CRITICAL CARE PAIN OBSERVATION TOOL)
PAIN_FUNCTIONAL_ASSESSMENT: WONG-BAKER FACES
PAIN_FUNCTIONAL_ASSESSMENT: UNABLE TO SELF-REPORT
PAIN_FUNCTIONAL_ASSESSMENT: CPOT (CRITICAL CARE PAIN OBSERVATION TOOL)

## 2024-01-01 ASSESSMENT — COGNITIVE AND FUNCTIONAL STATUS - GENERAL
EATING MEALS: TOTAL
EATING MEALS: TOTAL
TOILETING: TOTAL
DRESSING REGULAR LOWER BODY CLOTHING: TOTAL
PERSONAL GROOMING: TOTAL
STANDING UP FROM CHAIR USING ARMS: TOTAL
PERSONAL GROOMING: TOTAL
MOBILITY SCORE: 8
EATING MEALS: TOTAL
TURNING FROM BACK TO SIDE WHILE IN FLAT BAD: TOTAL
DAILY ACTIVITIY SCORE: 24
MOVING FROM LYING ON BACK TO SITTING ON SIDE OF FLAT BED WITH BEDRAILS: TOTAL
MOVING TO AND FROM BED TO CHAIR: TOTAL
DRESSING REGULAR LOWER BODY CLOTHING: TOTAL
MOVING FROM LYING ON BACK TO SITTING ON SIDE OF FLAT BED WITH BEDRAILS: A LOT
TURNING FROM BACK TO SIDE WHILE IN FLAT BAD: A LOT
WALKING IN HOSPITAL ROOM: TOTAL
MOBILITY SCORE: 8
PERSONAL GROOMING: TOTAL
DAILY ACTIVITIY SCORE: 6
PERSONAL GROOMING: TOTAL
TURNING FROM BACK TO SIDE WHILE IN FLAT BAD: A LOT
MOVING TO AND FROM BED TO CHAIR: TOTAL
EATING MEALS: TOTAL
MOVING TO AND FROM BED TO CHAIR: TOTAL
DRESSING REGULAR LOWER BODY CLOTHING: A LOT
HELP NEEDED FOR BATHING: TOTAL
DRESSING REGULAR LOWER BODY CLOTHING: TOTAL
TURNING FROM BACK TO SIDE WHILE IN FLAT BAD: A LOT
TOILETING: TOTAL
TOILETING: TOTAL
DRESSING REGULAR LOWER BODY CLOTHING: TOTAL
TURNING FROM BACK TO SIDE WHILE IN FLAT BAD: A LOT
PERSONAL GROOMING: TOTAL
TOILETING: TOTAL
DRESSING REGULAR LOWER BODY CLOTHING: TOTAL
TURNING FROM BACK TO SIDE WHILE IN FLAT BAD: A LOT
DAILY ACTIVITIY SCORE: 6
HELP NEEDED FOR BATHING: A LOT
CLIMB 3 TO 5 STEPS WITH RAILING: TOTAL
DAILY ACTIVITIY SCORE: 12
DRESSING REGULAR UPPER BODY CLOTHING: A LOT
DAILY ACTIVITIY SCORE: 6
CLIMB 3 TO 5 STEPS WITH RAILING: TOTAL
TOILETING: TOTAL
MOBILITY SCORE: 8
TURNING FROM BACK TO SIDE WHILE IN FLAT BAD: A LOT
DRESSING REGULAR UPPER BODY CLOTHING: TOTAL
EATING MEALS: TOTAL
DRESSING REGULAR UPPER BODY CLOTHING: TOTAL
MOVING FROM LYING ON BACK TO SITTING ON SIDE OF FLAT BED WITH BEDRAILS: A LOT
MOBILITY SCORE: 21
MOBILITY SCORE: 10
HELP NEEDED FOR BATHING: TOTAL
MOVING TO AND FROM BED TO CHAIR: TOTAL
CLIMB 3 TO 5 STEPS WITH RAILING: TOTAL
DAILY ACTIVITIY SCORE: 6
DAILY ACTIVITIY SCORE: 6
WALKING IN HOSPITAL ROOM: A LITTLE
CLIMB 3 TO 5 STEPS WITH RAILING: TOTAL
DAILY ACTIVITIY SCORE: 6
DAILY ACTIVITIY SCORE: 6
MOBILITY SCORE: 6
DRESSING REGULAR LOWER BODY CLOTHING: TOTAL
TURNING FROM BACK TO SIDE WHILE IN FLAT BAD: A LOT
MOVING FROM LYING ON BACK TO SITTING ON SIDE OF FLAT BED WITH BEDRAILS: A LOT
CLIMB 3 TO 5 STEPS WITH RAILING: A LOT
WALKING IN HOSPITAL ROOM: TOTAL
STANDING UP FROM CHAIR USING ARMS: TOTAL
DAILY ACTIVITIY SCORE: 6
EATING MEALS: TOTAL
DRESSING REGULAR LOWER BODY CLOTHING: TOTAL
CLIMB 3 TO 5 STEPS WITH RAILING: TOTAL
TOILETING: TOTAL
CLIMB 3 TO 5 STEPS WITH RAILING: TOTAL
HELP NEEDED FOR BATHING: TOTAL
PERSONAL GROOMING: TOTAL
WALKING IN HOSPITAL ROOM: A LITTLE
DRESSING REGULAR LOWER BODY CLOTHING: TOTAL
CLIMB 3 TO 5 STEPS WITH RAILING: TOTAL
MOVING FROM LYING ON BACK TO SITTING ON SIDE OF FLAT BED WITH BEDRAILS: TOTAL
MOVING FROM LYING ON BACK TO SITTING ON SIDE OF FLAT BED WITH BEDRAILS: A LOT
DRESSING REGULAR UPPER BODY CLOTHING: TOTAL
WALKING IN HOSPITAL ROOM: TOTAL
MOVING TO AND FROM BED TO CHAIR: TOTAL
TOILETING: TOTAL
PERSONAL GROOMING: TOTAL
CLIMB 3 TO 5 STEPS WITH RAILING: TOTAL
CLIMB 3 TO 5 STEPS WITH RAILING: TOTAL
TURNING FROM BACK TO SIDE WHILE IN FLAT BAD: A LOT
TOILETING: TOTAL
TURNING FROM BACK TO SIDE WHILE IN FLAT BAD: A LOT
HELP NEEDED FOR BATHING: TOTAL
EATING MEALS: TOTAL
DRESSING REGULAR UPPER BODY CLOTHING: TOTAL
EATING MEALS: TOTAL
WALKING IN HOSPITAL ROOM: TOTAL
CLIMB 3 TO 5 STEPS WITH RAILING: TOTAL
EATING MEALS: TOTAL
WALKING IN HOSPITAL ROOM: TOTAL
DRESSING REGULAR UPPER BODY CLOTHING: TOTAL
MOVING FROM LYING ON BACK TO SITTING ON SIDE OF FLAT BED WITH BEDRAILS: TOTAL
HELP NEEDED FOR BATHING: TOTAL
MOVING FROM LYING ON BACK TO SITTING ON SIDE OF FLAT BED WITH BEDRAILS: A LOT
DAILY ACTIVITIY SCORE: 6
MOVING TO AND FROM BED TO CHAIR: TOTAL
TOILETING: A LOT
WALKING IN HOSPITAL ROOM: TOTAL
MOVING FROM LYING ON BACK TO SITTING ON SIDE OF FLAT BED WITH BEDRAILS: A LOT
DRESSING REGULAR LOWER BODY CLOTHING: TOTAL
MOVING FROM LYING ON BACK TO SITTING ON SIDE OF FLAT BED WITH BEDRAILS: A LOT
HELP NEEDED FOR BATHING: TOTAL
WALKING IN HOSPITAL ROOM: TOTAL
MOBILITY SCORE: 8
CLIMB 3 TO 5 STEPS WITH RAILING: A LOT
STANDING UP FROM CHAIR USING ARMS: TOTAL
TURNING FROM BACK TO SIDE WHILE IN FLAT BAD: A LOT
DAILY ACTIVITIY SCORE: 6
CLIMB 3 TO 5 STEPS WITH RAILING: TOTAL
HELP NEEDED FOR BATHING: TOTAL
DRESSING REGULAR LOWER BODY CLOTHING: TOTAL
WALKING IN HOSPITAL ROOM: TOTAL
DRESSING REGULAR LOWER BODY CLOTHING: TOTAL
TOILETING: TOTAL
DRESSING REGULAR UPPER BODY CLOTHING: TOTAL
TOILETING: TOTAL
MOVING TO AND FROM BED TO CHAIR: TOTAL
MOBILITY SCORE: 8
HELP NEEDED FOR BATHING: TOTAL
MOVING TO AND FROM BED TO CHAIR: TOTAL
MOVING TO AND FROM BED TO CHAIR: TOTAL
WALKING IN HOSPITAL ROOM: TOTAL
DAILY ACTIVITIY SCORE: 6
MOBILITY SCORE: 6
PERSONAL GROOMING: A LOT
WALKING IN HOSPITAL ROOM: TOTAL
DAILY ACTIVITIY SCORE: 6
DRESSING REGULAR UPPER BODY CLOTHING: TOTAL
MOVING FROM LYING ON BACK TO SITTING ON SIDE OF FLAT BED WITH BEDRAILS: A LOT
WALKING IN HOSPITAL ROOM: TOTAL
HELP NEEDED FOR BATHING: TOTAL
DRESSING REGULAR LOWER BODY CLOTHING: TOTAL
HELP NEEDED FOR BATHING: TOTAL
MOVING FROM LYING ON BACK TO SITTING ON SIDE OF FLAT BED WITH BEDRAILS: A LOT
MOBILITY SCORE: 8
MOVING TO AND FROM BED TO CHAIR: TOTAL
EATING MEALS: A LOT
PERSONAL GROOMING: TOTAL
STANDING UP FROM CHAIR USING ARMS: TOTAL
MOVING FROM LYING ON BACK TO SITTING ON SIDE OF FLAT BED WITH BEDRAILS: A LOT
MOVING TO AND FROM BED TO CHAIR: TOTAL
TURNING FROM BACK TO SIDE WHILE IN FLAT BAD: TOTAL
CLIMB 3 TO 5 STEPS WITH RAILING: TOTAL
PERSONAL GROOMING: TOTAL
EATING MEALS: TOTAL
DRESSING REGULAR UPPER BODY CLOTHING: TOTAL
MOBILITY SCORE: 6
STANDING UP FROM CHAIR USING ARMS: TOTAL
STANDING UP FROM CHAIR USING ARMS: A LOT
STANDING UP FROM CHAIR USING ARMS: TOTAL
CLIMB 3 TO 5 STEPS WITH RAILING: TOTAL
DAILY ACTIVITIY SCORE: 24
MOBILITY SCORE: 8
PERSONAL GROOMING: TOTAL
MOVING TO AND FROM BED TO CHAIR: TOTAL
WALKING IN HOSPITAL ROOM: TOTAL
EATING MEALS: TOTAL
WALKING IN HOSPITAL ROOM: TOTAL
PERSONAL GROOMING: TOTAL
STANDING UP FROM CHAIR USING ARMS: TOTAL
TURNING FROM BACK TO SIDE WHILE IN FLAT BAD: TOTAL
DRESSING REGULAR UPPER BODY CLOTHING: TOTAL
MOBILITY SCORE: 21
STANDING UP FROM CHAIR USING ARMS: TOTAL
STANDING UP FROM CHAIR USING ARMS: TOTAL
HELP NEEDED FOR BATHING: TOTAL
PERSONAL GROOMING: TOTAL
TOILETING: TOTAL
PATIENT BASELINE BEDBOUND: NO
STANDING UP FROM CHAIR USING ARMS: TOTAL
TOILETING: TOTAL
MOVING TO AND FROM BED TO CHAIR: A LOT
MOBILITY SCORE: 8
DRESSING REGULAR UPPER BODY CLOTHING: TOTAL
HELP NEEDED FOR BATHING: TOTAL
MOBILITY SCORE: 8
DRESSING REGULAR UPPER BODY CLOTHING: TOTAL
EATING MEALS: TOTAL
DRESSING REGULAR UPPER BODY CLOTHING: TOTAL

## 2024-01-01 ASSESSMENT — PAIN SCALES - GENERAL
PAINLEVEL_OUTOF10: 0 - NO PAIN
PAINLEVEL_OUTOF10: 7
PAINLEVEL_OUTOF10: 4
PAINLEVEL_OUTOF10: 2
PAINLEVEL_OUTOF10: 8
PAINLEVEL_OUTOF10: 7
PAINLEVEL_OUTOF10: 8
PAINLEVEL_OUTOF10: 3
PAINLEVEL_OUTOF10: 7
PAINLEVEL_OUTOF10: 3
PAINLEVEL_OUTOF10: 8
PAINLEVEL_OUTOF10: 0 - NO PAIN
PAINLEVEL_OUTOF10: 0 - NO PAIN
PAINLEVEL_OUTOF10: 7
PAINLEVEL_OUTOF10: 0 - NO PAIN
PAINLEVEL_OUTOF10: 0 - NO PAIN
PAINLEVEL_OUTOF10: 7
PAINLEVEL_OUTOF10: 0 - NO PAIN
PAINLEVEL_OUTOF10: 0 - NO PAIN
PAINLEVEL_OUTOF10: 1
PAINLEVEL_OUTOF10: 0 - NO PAIN

## 2024-01-01 ASSESSMENT — ACTIVITIES OF DAILY LIVING (ADL)
LACK_OF_TRANSPORTATION: NO
JUDGMENT_ADEQUATE_SAFELY_COMPLETE_DAILY_ACTIVITIES: YES
ADEQUATE_TO_COMPLETE_ADL: YES
TOILETING: INDEPENDENT
DRESSING YOURSELF: INDEPENDENT
WALKS IN HOME: INDEPENDENT
ASSISTIVE_DEVICE: CRUTCHES
PATIENT'S MEMORY ADEQUATE TO SAFELY COMPLETE DAILY ACTIVITIES?: YES
GROOMING: INDEPENDENT
BATHING: INDEPENDENT
FEEDING YOURSELF: INDEPENDENT
HEARING - LEFT EAR: FUNCTIONAL
HEARING - RIGHT EAR: FUNCTIONAL

## 2024-01-01 ASSESSMENT — LIFESTYLE VARIABLES
HOW MANY STANDARD DRINKS CONTAINING ALCOHOL DO YOU HAVE ON A TYPICAL DAY: PATIENT DOES NOT DRINK
HAVE YOU EVER FELT YOU SHOULD CUT DOWN ON YOUR DRINKING: NO
HOW OFTEN DO YOU HAVE 6 OR MORE DRINKS ON ONE OCCASION: NEVER
EVER HAD A DRINK FIRST THING IN THE MORNING TO STEADY YOUR NERVES TO GET RID OF A HANGOVER: NO
SKIP TO QUESTIONS 9-10: 1
HOW OFTEN DO YOU HAVE A DRINK CONTAINING ALCOHOL: NEVER
AUDIT-C TOTAL SCORE: 0
HAVE PEOPLE ANNOYED YOU BY CRITICIZING YOUR DRINKING: NO
AUDIT-C TOTAL SCORE: 0
EVER FELT BAD OR GUILTY ABOUT YOUR DRINKING: NO

## 2024-01-01 ASSESSMENT — COLUMBIA-SUICIDE SEVERITY RATING SCALE - C-SSRS
1. IN THE PAST MONTH, HAVE YOU WISHED YOU WERE DEAD OR WISHED YOU COULD GO TO SLEEP AND NOT WAKE UP?: NO
2. HAVE YOU ACTUALLY HAD ANY THOUGHTS OF KILLING YOURSELF?: NO
2. HAVE YOU ACTUALLY HAD ANY THOUGHTS OF KILLING YOURSELF?: NO
1. IN THE PAST MONTH, HAVE YOU WISHED YOU WERE DEAD OR WISHED YOU COULD GO TO SLEEP AND NOT WAKE UP?: NO
6. HAVE YOU EVER DONE ANYTHING, STARTED TO DO ANYTHING, OR PREPARED TO DO ANYTHING TO END YOUR LIFE?: NO
6. HAVE YOU EVER DONE ANYTHING, STARTED TO DO ANYTHING, OR PREPARED TO DO ANYTHING TO END YOUR LIFE?: NO

## 2024-01-01 ASSESSMENT — PAIN DESCRIPTION - PAIN TYPE: TYPE: ACUTE PAIN

## 2024-01-01 ASSESSMENT — PATIENT HEALTH QUESTIONNAIRE - PHQ9
2. FEELING DOWN, DEPRESSED OR HOPELESS: NOT AT ALL
1. LITTLE INTEREST OR PLEASURE IN DOING THINGS: NOT AT ALL
SUM OF ALL RESPONSES TO PHQ9 QUESTIONS 1 & 2: 0

## 2024-01-01 ASSESSMENT — PAIN SCALES - WONG BAKER
WONGBAKER_NUMERICALRESPONSE: NO HURT
WONGBAKER_NUMERICALRESPONSE: HURTS LITTLE BIT

## 2024-01-01 ASSESSMENT — PAIN DESCRIPTION - LOCATION
LOCATION: ABDOMEN
LOCATION: GENERALIZED
LOCATION: ABDOMEN
LOCATION: ABDOMEN

## 2024-01-15 NOTE — H&P
History Of Present Illness  Luis Peacock Jr. is a 73 y.o. male with past medical history significant for CAD, s/p remote PCI's, ICM, s/p remote ICD, chronic systolic CHF, PVD, s/p remote AKA, HTN, HLD, COPD with recent complaints of feeling more tired, short of breath and noted to have episodes of ventricular tachycardia on device check.  Patient was seen by Dr. Suarez on 12/14/2023 and it was recommended he undergo LHC/possible PCI to further evaluate for progression of his ischemic heart disease.  Patient is at [facility] today for this procedure under the care of Dr. Suarez.    Patient denies recent complaints of illness, fevers, chills, sweats or exposure to COVID-19.  Denies complaints of lightheadedness, dizziness, headache, chest pain, shortness of breath or palpitations at the present time.  Denies complaints of nausea, vomiting, diarrhea or constipation.  Denies complaints of lower extremity edema or weakness.     Past Medical History  Past Medical History:   Diagnosis Date    Anesthesia of skin     Numbness    Angina, class IV (CMS/Formerly [facility])     CHF (congestive heart failure) (CMS/Formerly [facility])     Chronic obstructive pulmonary disease with (acute) exacerbation (CMS/Formerly [facility]) 12/08/2022    Acute exacerbation of chronic obstructive pulmonary disease (COPD)    Coronary artery disease     Cough, unspecified     Cough    Disorder of prostate, unspecified     Prostate disease    Encounter for general adult medical examination without abnormal findings 12/09/2021    Encounter for Medicare annual wellness exam    Encounter for immunization 10/16/2020    Encounter for immunization    Functional dyspepsia     Indigestion    Hypertension     Ischemic cardiomyopathy     Local infection of the skin and subcutaneous tissue, unspecified 09/08/2020    Staph skin infection    Mixed hyperlipidemia 12/09/2021    Mixed hyperlipidemia    Muscle weakness (generalized)     Localized muscle weakness    Nicotine dependence,  cigarettes, uncomplicated 10/16/2020    Heavy cigarette smoker    Other iron deficiency anemias 10/11/2021    Hypochromic erythrocytes    Other spondylosis, cervical region 03/16/2022    Other spondylosis, cervical region    Pain in leg, unspecified 03/16/2022    Leg pain    Personal history of other diseases of the circulatory system     History of cardiac disorder    Personal history of other diseases of the circulatory system     History of hypertension    Personal history of other diseases of the circulatory system 12/09/2021    History of class IV angina pectoris    Personal history of other diseases of the circulatory system 03/16/2022    History of vascular disorder    Personal history of other diseases of the musculoskeletal system and connective tissue     History of arthritis    Personal history of other diseases of the musculoskeletal system and connective tissue 03/16/2022    History of low back pain    Personal history of other diseases of the musculoskeletal system and connective tissue 03/16/2022    History of neck pain    Personal history of other specified conditions     History of nausea    Personal history of other specified conditions     History of chest pain    Personal history of other specified conditions     History of heartburn    Postnasal drip     Post-nasal drip    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    Pseudofolliculitis farrukh    Shortness of breath     Shortness of breath at rest    Snoring     Snoring       Surgical History  Past Surgical History:   Procedure Laterality Date    CORONARY ANGIOPLASTY WITH STENT PLACEMENT  04/27/2017    Cath Placement Of Stent 1    OTHER SURGICAL HISTORY  11/09/2021    Tooth extraction    OTHER SURGICAL HISTORY  11/09/2021    Leg surgery    OTHER SURGICAL HISTORY  11/09/2021    Cardioverter defibrillator insertion    OTHER SURGICAL HISTORY  11/09/2021    Amputation Of Leg Above Knee    OTHER SURGICAL HISTORY  04/03/2019    Cardiac catheterization with  "stent placement        Social History  Tobacco use  Denies alcohol use  Denies illicit drug use    Family History  No family history on file.     Allergies  Morphine and Simvastatin    Review of Systems   Constitutional: Negative.    HENT: Negative.     Eyes: Negative.    Respiratory: Negative.  Negative for chest tightness and shortness of breath.    Cardiovascular: Negative.  Negative for palpitations.   Gastrointestinal: Negative.    Endocrine: Negative.    Genitourinary: Negative.    Skin: Negative.    Allergic/Immunologic: Negative.    Neurological: Negative.  Negative for dizziness and light-headedness.   Psychiatric/Behavioral: Negative.     All other systems reviewed and are negative.    Physical Exam  Vitals reviewed.   Constitutional:       Appearance: Normal appearance.   HENT:      Head: Normocephalic and atraumatic.      Nose: Nose normal.      Mouth/Throat:      Mouth: Mucous membranes are moist.      Pharynx: Oropharynx is clear.   Eyes:      Pupils: Pupils are equal, round, and reactive to light.   Cardiovascular:      Rate and Rhythm: Normal rate and regular rhythm.      Pulses: Normal pulses.      Heart sounds: Normal heart sounds.   Pulmonary:      Effort: Pulmonary effort is normal.      Breath sounds: Normal breath sounds.   Abdominal:      General: Bowel sounds are normal.      Palpations: Abdomen is soft.   Musculoskeletal:         General: Normal range of motion.      Cervical back: Normal range of motion and neck supple.   Skin:     General: Skin is warm and dry.   Neurological:      General: No focal deficit present.      Mental Status: He is alert and oriented to person, place, and time.   Psychiatric:         Mood and Affect: Mood normal.         Behavior: Behavior normal.       Last Recorded Vitals  Blood pressure (!) 172/95, pulse 59, temperature 36.3 °C (97.3 °F), temperature source Temporal, resp. rate 16, height 1.651 m (5' 5\"), weight (!) 43.3 kg (95 lb 7.4 oz), SpO2 100 " %.    Relevant Results  Results for orders placed or performed during the hospital encounter of 01/15/24 (from the past 24 hour(s))   ECG 12 lead STAT   Result Value Ref Range    Ventricular Rate 60 BPM    Atrial Rate 60 BPM    KS Interval 150 ms    QRS Duration 90 ms    QT Interval 460 ms    QTC Calculation(Bazett) 460 ms    R Axis 76 degrees    T Axis 46 degrees    QRS Count 10 beats    Q Onset 219 ms    P Onset 143 ms    P Offset 177 ms    T Offset 449 ms    QTC Fredericia 460 ms   Basic Metabolic Panel   Result Value Ref Range    Glucose 89 74 - 99 mg/dL    Sodium 137 136 - 145 mmol/L    Potassium 3.9 3.5 - 5.3 mmol/L    Chloride 99 98 - 107 mmol/L    Bicarbonate 34 (H) 21 - 32 mmol/L    Anion Gap 8 (L) 10 - 20 mmol/L    Urea Nitrogen 12 6 - 23 mg/dL    Creatinine 0.77 0.50 - 1.30 mg/dL    eGFR >90 >60 mL/min/1.73m*2    Calcium 9.3 8.6 - 10.3 mg/dL   CBC   Result Value Ref Range    WBC 3.8 (L) 4.4 - 11.3 x10*3/uL    nRBC 0.0 0.0 - 0.0 /100 WBCs    RBC 3.62 (L) 4.50 - 5.90 x10*6/uL    Hemoglobin 13.1 (L) 13.5 - 17.5 g/dL    Hematocrit 39.4 (L) 41.0 - 52.0 %     (H) 80 - 100 fL    MCH 36.2 (H) 26.0 - 34.0 pg    MCHC 33.2 32.0 - 36.0 g/dL    RDW 13.3 11.5 - 14.5 %    Platelets 154 150 - 450 x10*3/uL        ECG 12 lead STAT  Result Date: 1/15/2024  Atrial-paced rhythm Abnormal ECG When compared with ECG of 14-JUN-2021 07:04, T wave inversion no longer evident in Inferior leads    Vascular US carotid artery duplex bilateral  Result Date: 1/3/2024  [facility] [address], [address] [address]          Tel [phone] Fax [phone]  Vascular Lab Report  VASC US CAROTID ARTERY DUPLEX BILATERAL Patient Name:      ALEX CLOUD JR.     Reading Physician:  [id number] Toni Campos MD Study Date:        1/2/2024             Ordering Provider:  [id number] AMARILIS RAMIREZ MRN/PID:           [id number]             Fellow: Accession#:         [id number]         Technologist:       Adriana Shabazz RDMS,                                                             RCS, RVS Date of Birth/Age: 1950 / 73 years Technologist 2: Gender:            M                    Encounter#:         [id number] Admission Status:  Outpatient           Location Performed: [facility]  Diagnosis/ICD: Anesthesia of skin-R20.0; Essential primary hypertension-I10 Indication:    Left arm numbness, HTN, Dizziness, HLD, PVD, Current smoker CPT Codes:     87919 Cerebrovascular Carotid Duplex scan complete  CONCLUSIONS: Right Carotid: Findings are consistent with less than 50% stenosis of the right proximal internal carotid artery. Laminar flow seen by color Doppler. Right external carotid artery appears patent with no evidence of stenosis. No evidence of hemodynamically significant stenosis of the right common carotid artery. The right vertebral artery is patent with antegrade flow. Left Carotid: Findings are consistent with less than 50% stenosis of the left proximal internal carotid artery. Laminar flow seen by color Doppler. Left external carotid artery appears patent with no evidence of stenosis. No evidence of hemodynamically significant stenosis of the left common carotid artery. The left vertebral artery is patent with antegrade flow.  Imaging & Doppler Findings: Right Plaque Morph: The distal right common carotid artery demonstrates calcified plaque. Left Plaque Morph: The proximal left internal carotid artery demonstrates calcified plaque. The distal left common carotid artery demonstrates intimal thickening plaque.       Assessment/Plan   Ventricular tachycardia//dyspnea/CAD/remote PCI   -C/possible PCI today under the care of Dr. Suarez.  2.    Ischemic cardiomyopathy/chronic systolic heart failure   -LVEF 30%   -S/p remote ICD 2/10/2020  3.    Benign essential hypertension   -Stable  4.    Dyslipidemia   -On statin therapy  5.    COPD/tobacco use  6.     Peripheral vascular disease   -S/p remote AKA for gangrene  7.    Underweight   -BMI 15.9        I spent 35 minutes in the professional and overall care of this patient.      Lauren Stack, MICAELA-CNP

## 2024-01-15 NOTE — PROGRESS NOTES
Patient is stable status post LHC under the care of Dr. Suarez.  Discussed results of procedure with patient and his family member.  Pictures provided.  Findings of the LHC revealed 50% stenosis in the LAD, patent stents in the circumflex, patent stents in the RCA with 50% stenosis in the proximal RCA and an LVEF of 35%.  Continued medical management is advised.  Smoking cessation strongly advised.  Patient is scheduled to follow-up with the electrophysiology service in 2 weeks.  Further recommendations will be made at that time.  Patient will be scheduled to follow-up with Dr. Suarez per routine.  All questions answered.  Both verbalized understanding.

## 2024-01-15 NOTE — DISCHARGE INSTRUCTIONS

## 2024-01-15 NOTE — Clinical Note
Multiple views of the right coronary artery obtained using power injection. Rate = 4 mL/sec. Total volume = 8 mL. [facility] -  PROGRESS NOTE               Author: Meenu Gilbert M.D. Date & Time: 8/14/2017  3:08 PM     HPI:  This is a 64-year-old  male with a past medical history significant for hypertension, hyperlipidemia, alcohol abuse who was initially admitted with severe back pain and was found to have a left upper lung mass with suspected bony and liver metastases and now noted to have acute hypernatremia and mental status changes within the past 24 hours.  The patient at this time is confused and lethargic and is unable to give me any history.  The majority of history was obtained through review of the medical records and discussion with the other physicians on the case.  The patient was initially admitted with complaints of severe back pain and he had imaging done as an outpatient that revealed liver and bony mets of unknown primary.  He subsequently underwent a CT scan of the chest that showed a left upper lobe lung mass and so he was admitted for further diagnostic evaluation as well as for pain control.  When patient was initially admitted, he was noted to have an elevated creatinine of 2.39.  It is unclear what his baseline creatinine is and it is unclear if the CT scan done as an outpatient involved IV contrast.  He was hydrated with normal saline with improvement in his renal function.  However, his sodium level started to increase, it was 132 on admission, it was 141 yesterday and this morning's labs revealed a sodium level of 157 and acute change within the past 24 hours and repeat labs later in the morning revealed a sodium level of 163, the patient's normal saline was discontinued.  He was started on D5W.  It should be noted that according to the primary service the patient's mental status was fairly stable on admission.  He was alert and oriented; however, starting yesterday, patient was noted to have increased tremulousness and some mental status changes that initially  were attributed to possible alcohol withdrawal given his heavy alcohol use; however, his symptoms have worsened today.  He is more lethargic and so he has been transferred to the intensive care unit for further evaluation.  It is unclear what his urine output is, he has been incontinent and Larios catheter is being placed.  His urine studies revealed a urine sodium of less than 10 and a urine osmolality of 199 and a serum osmolality of 349.  As stated above, his creatinine has improved throughout his hospital stay, although it is slightly higher this afternoon at 1.59.      DAILY NEPHROLOGY SUMMARY:  8/11/17--Remains confused, evidence of EtOH withdrawal, urine studies consistent with central DI as urine osm did increase after DDAVP, after larios placed pt having large urinary output, Ca improved, BP stable  8/12/17 - Sodium normal, agree on decreasing fluid, D/C calcitonin   8/14/17 - NAEO per RN staff, overall unchanged mental status      Review of Systems   Unable to perform ROS: patient nonverbal         Physical Exam   Constitutional: He appears well-developed and well-nourished.   HENT:   Head: Normocephalic and atraumatic.   Eyes: Conjunctivae are normal. No scleral icterus.   Neck: Neck supple. No tracheal deviation present.   Cardiovascular: Normal rate and regular rhythm.    Pulmonary/Chest: Effort normal and breath sounds normal.   Abdominal: Soft. Bowel sounds are normal.   Musculoskeletal: He exhibits no edema or tenderness.   Neurological: He is alert. He displays no atrophy. He exhibits normal muscle tone.   Skin: Skin is warm and dry.   Psychiatric: He is agitated. He is noncommunicative.   Nursing note and vitals reviewed.        PAST FAMILY HISTORY: Reviewed and Unchanged  SOCIAL HISTORY: Reviewed and Unchanged  CURRENT MEDICATIONS: Reviewed  LABORATORY RESULTS: Reviewed  IMAGING STUDIES: Reviewed      Fluids:    Date 08/14/17 0700 - 08/15/17 0659   Shift 5317-4912 9425-7592 5557-4289 24 Hour Total    I  N  T  A  K  E   I.V. 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 acid base disturbance  - Electrolytes: stable  - Anemia: Goal Hgb 10-11  - MBD: Ca normal, PO4 normal  - HD Access: None    PLAN:  - No compelling indication for RRT at this time  - Continue ddAVP 2 mcg BID  - Monitor I's and O's  - Daily labs  - [facility] protocol  - Pamidronate given 8/10/17

## 2024-01-15 NOTE — POST-PROCEDURE NOTE
Physician Transition of Care Summary  Invasive Cardiovascular Lab    Procedure Date: 1/15/2024  Attending:    * Zen Suarez - Primary  Resident/Fellow/Other Assistant: Surgeon(s) and Role:    Indications:   Pre-op Diagnosis     * CAD S/P percutaneous coronary angioplasty [I25.10, Z98.61]     * Inappropriate discharge of implantable cardioverter-defibrillator (ICD), subsequent encounter [T82.198D]     * Benign essential hypertension [I10]    Post-procedure diagnosis:   Post-op Diagnosis     * CAD S/P percutaneous coronary angioplasty [I25.10, Z98.61]     * Inappropriate discharge of implantable cardioverter-defibrillator (ICD), subsequent encounter [T82.198D]     * Benign essential hypertension [I10]    Procedure(s):   Left Heart Cath  55516 - [place] CATH PLMT L HRT & ARTS W/NJX & ANGIO IMG S&I        Procedure Findings:   50% stenosis of LAD, widely patent previous circumflex stent, patent previous right coronary artery stent with 50% stenosis of proximal right coronary artery and mild diffuse disease of the post lateral ventricular branch, ejection fraction with 35%    Description of the Procedure:   Left heart catheterization coronary angiography left ventriculogram    Complications:   None    Stents/Implants:       Anticoagulation/Antiplatelet Plan:   Intravenous heparin    Estimated Blood Loss:   * No values recorded between 1/15/2024 10:07 AM and 1/15/2024 10:37 AM *    Anesthesia: Moderate Sedation Anesthesia Staff: No anesthesia staff entered.    Any Specimen(s) Removed:   Order Name Source Comment Collection Info Order Time   BASIC METABOLIC PANEL Blood, Venous  Collected By: Brooke Taylor RN 1/15/2024  6:35 AM     [facility]   Immediate        CBC Blood, Venous  Collected By: Brooke Taylor RN 1/15/2024  6:35 AM     Release result to MyChart   Immediate            Disposition:   Medical therapy      Electronically signed by: Zen Suarez MD, 1/15/2024 10:37 AM

## 2024-02-01 PROBLEM — T82.198A INAPPROPRIATE SHOCKS FROM ICD (IMPLANTABLE CARDIOVERTER-DEFIBRILLATOR): Status: RESOLVED | Noted: 2023-01-01 | Resolved: 2024-01-01

## 2024-02-01 NOTE — PROGRESS NOTES
"CARDIOLOGY OFFICE VISIT      CHIEF COMPLAINT  Chief Complaint   Patient presents with    Device Check     Pt here today for 8 week PMC.   Chief complaint: \"I am doing about the same.\"    HISTORY OF PRESENT ILLNESS  HPI  History: The patient is a 73-year-old -American male who is followed for ischemic cardiomyopathy with a left ventricular ejection fraction of 30 to 35% per 2D echocardiogram dated August 2, 2023, [place] Heart Association class II-III, stage C heart failure, coronary artery disease status post remote multivessel angioplasty with stent deployment.  He underwent a left heart catheterization on January 15, 2024 for evaluation with complaints of increasing fatigue, shortness of breath, and nonsustained ventricular tachycardia.  Left heart catheterization revealed 10 to 30% left main, 50% proximal LAD, patent circumflex and RCA stents, 50% proximal RCA stenosis, and 75% PLV B diffuse stenosis with recommendation for medical management.  The patient denies chest pain, palpitations, dizziness or lightheadedness.  He states that he is slightly more fatigued and short of breath with exertion.  He does have a history of chronic obstructive pulmonary disease with CT of the chest revealing new 6 mm right apical nodular density with recommendation for repeat CT scan in 6 months.  Review of the medical records reveals the device interrogation dated September, 2023 revealed 1 episode of sustained ventricular tachycardia on September 9, 2023 at 11:32 PM which was successfully terminated with ATP x 1.  The patient states when he lays on his right side the room spins.  He states typically after several minutes the sensation will stop or he will reposition himself.  He denies near or kash syncope.  Past Medical History  Past Medical History:   Diagnosis Date    Anesthesia of skin     Numbness    Angina, class IV (CMS/[facility])     CHF (congestive heart failure) (CMS/HCC)     Chronic obstructive pulmonary disease " with (acute) exacerbation (CMS/[facility]) 12/08/2022    Acute exacerbation of chronic obstructive pulmonary disease (COPD)    Coronary artery disease     Cough, unspecified     Cough    Disorder of prostate, unspecified     Prostate disease    Encounter for general adult medical examination without abnormal findings 12/09/2021    Encounter for Medicare annual wellness exam    Encounter for immunization 10/16/2020    Encounter for immunization    Functional dyspepsia     Indigestion    Hypertension     Ischemic cardiomyopathy     Local infection of the skin and subcutaneous tissue, unspecified 09/08/2020    Staph skin infection    Mixed hyperlipidemia 12/09/2021    Mixed hyperlipidemia    Muscle weakness (generalized)     Localized muscle weakness    Nicotine dependence, cigarettes, uncomplicated 10/16/2020    Heavy cigarette smoker    Other iron deficiency anemias 10/11/2021    Hypochromic erythrocytes    Other spondylosis, cervical region 03/16/2022    Other spondylosis, cervical region    Pain in leg, unspecified 03/16/2022    Leg pain    Personal history of other diseases of the circulatory system     History of cardiac disorder    Personal history of other diseases of the circulatory system     History of hypertension    Personal history of other diseases of the circulatory system 12/09/2021    History of class IV angina pectoris    Personal history of other diseases of the circulatory system 03/16/2022    History of vascular disorder    Personal history of other diseases of the musculoskeletal system and connective tissue     History of arthritis    Personal history of other diseases of the musculoskeletal system and connective tissue 03/16/2022    History of low back pain    Personal history of other diseases of the musculoskeletal system and connective tissue 03/16/2022    History of neck pain    Personal history of other specified conditions     History of nausea    Personal history of other specified conditions      History of chest pain    Personal history of other specified conditions     History of heartburn    Postnasal drip     Post-nasal drip    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    Shortness of breath     Shortness of breath at rest    Snoring     Snoring       Social History  Social History     Tobacco Use    Smoking status: Every Day     Packs/day: .2     Types: Cigarettes    Smokeless tobacco: Never   Substance Use Topics    Alcohol use: Not Currently    Drug use: Never       Family History     Family History   Problem Relation Name Age of Onset    Alzheimer's disease Mother  74    Other (old age) Father  81        Allergies:  Allergies   Allergen Reactions    Morphine Itching    Simvastatin Itching        Outpatient Medications:  Current Outpatient Medications   Medication Instructions    albuterol 2.5 mg /3 mL (0.083 %) nebulizer solution USE 1 UNIT DOSE EVERY 4-6 HOURS AS NEEDED FOR WHEEZING .    albuterol 90 mcg/actuation inhaler Inhale 2 puffs by mouth up to every 4 hours as needed for shortness of breath, wheezing, or prior to exercise.    amLODIPine (NORVASC) 5 mg, oral, Daily    aspirin 81 mg chewable tablet 1 tablet, oral, Daily    atorvastatin (LIPITOR) 40 mg, oral, Nightly    cholecalciferol (Vitamin D-3) 25 MCG (1000 UT) tablet TAKE AS DIRECTED.    clopidogrel (PLAVIX) 75 mg, oral, Daily    furosemide (LASIX) 20 mg, oral, Daily    gabapentin (Neurontin) 300 mg capsule take 2-4 capsules BY MOUTH THREE TIMES DAILY AS DIRECTED    lisinopril 5 mg, oral, Daily    methadone (DOLOPHINE) 5 mg, oral, Every 6 hours    methadone (DOLOPHINE) 5 mg, oral, Every 6 hours PRN    [START ON 2/11/2024] methadone (DOLOPHINE) 5 mg, oral, Every 6 hours PRN    metoprolol succinate XL (TOPROL-XL) 50 mg, oral, Every evening    naloxone (Narcan) 4 mg/0.1 mL nasal spray USE 1 SPRAY IN ONE NOSTRILONCE AS NEEDED FOR OVERDOSE SYMPTOMS. MAY REPEAT DOSE IF NEEDED IN 2-3 MINUTES IN YOUR OTHER NOSTRIL.     nitroglycerin (Nitrostat) 0.4 mg SL tablet DISSOLVE 1 TABLET UNDER THE TONGUE AS NEEDED FOR CHEST PAIN- MAY REPEA    pantoprazole (ProtoNix) 40 mg EC tablet TAKE ONE TABLET BY MOUTH DAILY    sildenafil (Viagra) 100 mg tablet TAKE 1/2 to 1 whole TABLET 1 HOUR BEFORE NEEDED          REVIEW OF SYSTEMS  Review of Systems   All other systems reviewed and are negative.        VITALS  Vitals:    02/01/24 1224   BP: 130/78   Pulse: 73       PHYSICAL EXAM  Vitals and nursing note reviewed.   Constitutional:       Appearance: Normal appearance.   HENT:      Head: Normocephalic.   Neck:      Vascular: No JVD.   Cardiovascular:      Rate and Rhythm: Normal rate and regular rhythm.      Pulses: Normal pulses.      Heart sounds: Normal heart sounds.   Pulmonary:      Effort: Pulmonary effort is normal.      Breath sounds: Normal breath sounds.   Abdominal:      General: Bowel sounds are normal.      Palpations: Abdomen is soft.   Musculoskeletal:         General: Normal range of motion.      Cervical back: Normal range of motion.      Right above the knee amputation.  Skin:     General: Skin is warm and dry. Left subclavian ICD pocket is well-healed without redness swelling or drainage.  Neurological:      General: No focal deficit present.      Mental Status: She is alert and oriented to person, place, and time.      Motor: Motor function is intact.   Psychiatric:         Attention and Perception: Attention and perception normal.         Mood and Affect: Mood and affect normal.         Speech: Speech normal.         Behavior: Behavior normal. Behavior is cooperative.         Thought Content: Thought content normal.         Cognition and Memory: Cognition and memory normal.     Labs and testing: Twelve-lead EKG reveals sinus rhythm with 2 PVCs.  QRS durations 92 ms,  ms, QTc 475 ms.  No acute ischemic changes or infarct patterns are noted.  2D echocardiogram dated August 2, 2023 reveals an ejection fraction of 30 to 35%,  moderate mitral valve thickening, mild to moderate MR, 2-3+ TR, pulmonary hypertension with right ventricular systolic pressure 71 mmHg, mild AR, and moderate IVC dilation.  Device interrogation dated February 1, 2024 reveals atrial pacing 17% and ventricular pacing 2%.  No atrial or ventricular arrhythmic events are noted.  Estimated battery longevity is 10 years and 6 months.  Good sensing and capture thresholds are noted.      ASSESSMENT AND PLAN       Clinical impressions:  1. Ischemic cardiomyopathy with left ventricular ejection fraction of 30%, New [place] Heart Association class II, stage C heart failure.  2. Coronary artery disease status post multivessel angioplasty with stent deployment with left heart catheterization dated January 15, 2024 revealing 10 to 30% left main, 50% mid LAD, patent stents in the circumflex and RCA, 50% proximal RCA, 75% diffuse PLV B with recommendation for medical management.    3. Dual-chamber ICD implant (Sabre timi RESENDEZ) on February 10, 2020 for primary prevention of sudden cardiac death.  4. Sinus node dysfunction status post dual-chamber ICD as above.  5. Dyslipidemia on statin.  6. Hypertension, controlled with a blood pressure today of 130/78.  7. Status post right above-the-knee amputation for gangrene.  8. COPD with ongoing tobacco use.  9. Underweight with BMI of 15.81.  10. Tobacco dependence.  11.  Valvular heart disease consisting of moderate mitral valve thickening, mild to moderate MR, 2-3+ TR, mild AR per 2D echocardiogram dated August 2, 2023.  12.  Pulmonary hypertension with right ventricular systolic pressure 71 mmHg per 2D echocardiogram dated August 2, 2023.  13.  New 6 mm right apical nodular density with recommendation for repeat CT scan in 6 months per CT of the chest.  Repeat CT scheduled for May 15, 2024.  14.  Benign positional vertigo.    Recommendations:  1.  Continue current medications as prescribed.  2.  Obtain a remote device  interrogation on March 20, 2024.  Patient will undergo an in clinic check in 6 months prior to the office visit with Dr. Pearson.  3.  Follow-up in office with Dr. Pearson in 6 months or sooner if needed.  4.  Follow-up in office with Dr. Suarez on April 16, 2024 at 9:30 AM as scheduled.  5.  Obtain the CT of the chest on May 15, 2024 at 9 AM as scheduled.  6.  Continue lifestyle modifications as discussed.      Evaluation and note by Beth Lynne CNP  **Please excuse any errors in grammar or translation related to this dictation.  Voice recognition software was utilized to prepare this document.**

## 2024-02-29 NOTE — PROGRESS NOTES
Subjective   Patient ID: Luis Peacock Jr. is a 73 y.o. male who presents for Labs Only (Pt in today for lab draw).    HPI     Review of Systems    Objective   There were no vitals taken for this visit.    Physical Exam    Assessment/Plan

## 2024-03-03 PROBLEM — J21.0 RSV (ACUTE BRONCHIOLITIS DUE TO RESPIRATORY SYNCYTIAL VIRUS): Status: ACTIVE | Noted: 2024-01-01

## 2024-03-03 NOTE — H&P
History Of Present Illness  Luis Peacock Jr. is a 73 y.o. male, from home with a past medical history of CAD status post PCI, ischemic cardiomyopathy status post ICD, systolic CHF, PVD status post Rt AKA, HTN, HLD, COPD, who presented with worsening shortness of breath after he woke up. He tried to use his home breathing treatment but no improvement. Called EMS and o their arrival he was saturating 80% on RA. They gave him an albuterol which did not improve him so they placed him on 3 DuoNeb treatments, given 125 of IV Solu-Medrol and 2 g of IV magnesium. Pt also is complaining of abdominal pain, and chest discomfort and is asking for relief of his distress. Pt denies LOC, bleeding, nausea, vomiting, diarrhea, constipation, urinary symptoms    Patient is from home and ambulates with crutches and wheelchair, lives with his family  Social: Active smoker, no alcohol or drug  PSH: Cardiac stent, PCI, leg surgery, AICD, AKA, cardiac cath  FH: Noncontributory    ED course: /108, HR 85, RR 22, afebrile, O2 sat 90% on supplemental oxygen  Labs: Hyponatremia 132, positive for RSV, CXR positive for chronic COPD  EKG: Sinus rhythm with occasional PVC, nonspecific ST abnormalities  Was continued on oxygen in the ER however had respiratory distress and desatting even with exertion.  He was given Zofran and Dilaudid in the ER.    Patient was admitted for management of acute hypoxic respiratory failure in the setting of COPD exacerbation due to RSV upper respiratory tract infection     Past Medical History  He has a past medical history of Anesthesia of skin, Angina, class IV (CMS/[facility]), CHF (congestive heart failure) (CMS/[facility]), Chronic obstructive pulmonary disease with (acute) exacerbation (CMS/[facility]) (12/08/2022), Coronary artery disease, Cough, unspecified, Disorder of prostate, unspecified, Encounter for general adult medical examination without abnormal findings (12/09/2021), Encounter for immunization (10/16/2020),  Functional dyspepsia, Hypertension, Ischemic cardiomyopathy, Local infection of the skin and subcutaneous tissue, unspecified (09/08/2020), Mixed hyperlipidemia (12/09/2021), Muscle weakness (generalized), Nicotine dependence, cigarettes, uncomplicated (10/16/2020), Other iron deficiency anemias (10/11/2021), Other spondylosis, cervical region (03/16/2022), Pain in leg, unspecified (03/16/2022), Personal history of other diseases of the circulatory system, Personal history of other diseases of the circulatory system, Personal history of other diseases of the circulatory system (12/09/2021), Personal history of other diseases of the circulatory system (03/16/2022), Personal history of other diseases of the musculoskeletal system and connective tissue, Personal history of other diseases of the musculoskeletal system and connective tissue (03/16/2022), Personal history of other diseases of the musculoskeletal system and connective tissue (03/16/2022), Personal history of other specified conditions, Personal history of other specified conditions, Personal history of other specified conditions, Postnasal drip, 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    Surgical History  He has a past surgical history that includes Coronary angioplasty with stent (04/27/2017); Other surgical history (11/09/2021); Other surgical history (11/09/2021); Other surgical history (11/09/2021); Other surgical history (11/09/2021); Other surgical history (04/03/2019); and Cardiac catheterization (Left, 1/15/2024).     Social History  He reports that he has been smoking cigarettes. He has been smoking an average of .2 packs per day. He has never used smokeless tobacco. He reports that he does not currently use alcohol. He reports that he does not use drugs.    Family History  Family History   Problem Relation Name Age of Onset    Alzheimer's disease Mother  74    Other (old age) Father  81        Allergies  Morphine  and Simvastatin    Review of Systems  10 points ROS was negative except as mentioned per HPI     Physical Exam  General: Well-developed thin male, has respiratory distress, on facemask  HEENT: AT, NC, no JVD, no lymphadenopathy, neck supple  Lungs: Generalized wheezing and coarse breath sounds  Cardiac: Normal S1-S2, no murmur, no gallop  Abdomen: Soft, nontender, no distention, positive bowel sound  Extremities: Right AKA, no edema, pulses intact, ROM intact  Neurological: Alert awake oriented x3, sensation intact, clear speech       Last Recorded Vitals  BP (!) 183/96   Pulse 76   Temp 37 °C (98.6 °F)   Resp 20   Wt (!) 43.1 kg (95 lb)   SpO2 99%        Assessment/Plan   Principal Problem:    COPD (chronic obstructive pulmonary disease) (CMS/[facility])    Luis Peacock Jr. is a 73 y.o. male, from home with a past medical history of CAD status post PCI, ischemic cardiomyopathy status post ICD, systolic CHF, PVD status post KRYSTLE, HTN, HLD, COPD, who was admitted for management of acute hypoxic respiratory failure in the setting of COPD exacerbation due to RSV upper respiratory tract infection    Acute hypoxic respiratory failure due to RSV upper respiratory infection  COPD exacerbation  Abdominal pain    Droplet precaution  Continue with oxygen therapy via facemasks, will place the patient on telemetry monitor  Solu-Medrol, DuoNeb, Tylenol  Patient is methadone dependent and follow-up with pain management as outpatient for right leg status post AKA.  He does not want to be on methadone currently but he will aspirate if he feels so  Aspiration precaution  We will provide abdominal CAT scan for pain  Will place the patient on Protonix and antiemetic  Continue with home meds for other comorbidities  VTE prophylaxis heparin subcu   Disposition: Home once hemodynamically stable    Warner Rodas MD

## 2024-03-03 NOTE — ED PROVIDER NOTES
HPI   Chief Complaint   Patient presents with    Shortness of Breath     80% in RA, hx of CPOD, smoker, HF. Squad gave 2g Mg, 125 solumedrol and 3 duonebs       73-year-old male presents emergency department with complaints of shortness of breath.  Patient states history of COPD and CHF, describes himself as an ex-smoker.  States woke up this morning feeling very short of breath, tried to give himself a treatment but was unable to get under control, states he usually can.  Called EMS.  Per EMS he was saturating 80% on room air on their arrival.  They gave him an albuterol which did not improve him so they placed him on 3 DuoNeb treatments, given 125 of IV Solu-Medrol and 2 g of IV magnesium.    Patient states he does have intermittent shortness of breath, nothing new or different today.    Complained of some associated chest discomfort, CT now has a headache after all medications as well.    Notes that he did recently have a cardiac catheterization performed      History provided by:  Patient   used: No                        Vanduser Coma Scale Score: 15                     Patient History   Past Medical History:   Diagnosis Date    Anesthesia of skin     Numbness    Angina, class IV (CMS/[facility])     CHF (congestive heart failure) (CMS/[facility])     Chronic obstructive pulmonary disease with (acute) exacerbation (CMS/[facility]) 12/08/2022    Acute exacerbation of chronic obstructive pulmonary disease (COPD)    Coronary artery disease     Cough, unspecified     Cough    Disorder of prostate, unspecified     Prostate disease    Encounter for general adult medical examination without abnormal findings 12/09/2021    Encounter for Medicare annual wellness exam    Encounter for immunization 10/16/2020    Encounter for immunization    Functional dyspepsia     Indigestion    Hypertension     Ischemic cardiomyopathy     Local infection of the skin and subcutaneous tissue, unspecified 09/08/2020    Staph skin infection     Mixed hyperlipidemia 12/09/2021    Mixed hyperlipidemia    Muscle weakness (generalized)     Localized muscle weakness    Nicotine dependence, cigarettes, uncomplicated 10/16/2020    Heavy cigarette smoker    Other iron deficiency anemias 10/11/2021    Hypochromic erythrocytes    Other spondylosis, cervical region 03/16/2022    Other spondylosis, cervical region    Pain in leg, unspecified 03/16/2022    Leg pain    Personal history of other diseases of the circulatory system     History of cardiac disorder    Personal history of other diseases of the circulatory system     History of hypertension    Personal history of other diseases of the circulatory system 12/09/2021    History of class IV angina pectoris    Personal history of other diseases of the circulatory system 03/16/2022    History of vascular disorder    Personal history of other diseases of the musculoskeletal system and connective tissue     History of arthritis    Personal history of other diseases of the musculoskeletal system and connective tissue 03/16/2022    History of low back pain    Personal history of other diseases of the musculoskeletal system and connective tissue 03/16/2022    History of neck pain    Personal history of other specified conditions     History of nausea    Personal history of other specified conditions     History of chest pain    Personal history of other specified conditions     History of heartburn    Postnasal drip     Post-nasal drip    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 AKA on right side    CORONARY ANGIOPLASTY WITH STENT PLACEMENT  04/27/2017    Cath Placement  Of Stent 1    OTHER SURGICAL HISTORY  11/09/2021    Tooth extraction    OTHER SURGICAL HISTORY  11/09/2021    Leg surgery    OTHER SURGICAL HISTORY  11/09/2021    Cardioverter defibrillator insertion    OTHER SURGICAL HISTORY  11/09/2021    Amputation Of Leg Above Knee    OTHER SURGICAL HISTORY  04/03/2019    Cardiac catheterization with stent placement     Family History   Problem Relation Name Age of Onset    Alzheimer's disease Mother  74    Other (old age) Father  81     Social History     Tobacco Use    Smoking status: Every Day     Packs/day: .2     Types: Cigarettes    Smokeless tobacco: Never   Substance Use Topics    Alcohol use: Not Currently    Drug use: Never       Physical Exam   ED Triage Vitals [03/03/24 0641]   Temperature Heart Rate Respirations BP   37 °C (98.6 °F) 85 (!) 22 (!) 164/108      Pulse Ox Temp Source Heart Rate Source Patient Position   (!) 90 % Temporal Monitor Lying      BP Location FiO2 (%)     Left arm 50 %       Physical Exam  Physical Exam:  Constitutional: Vitals noted, no distress. Afebrile.   EENT: TMs clear. Posterior oropharynx unremarkable.   Cardiovascular: Regular, rate, rhythm, no murmur.   Pulmonary: Lungs moderately diminished throughout without significant adventitious breath sounds.  Positive increased respiratory effort, mild distress  Gastrointestinal: Soft, nonsurgical. Nontender. No peritoneal signs. Normoactive bowel sounds.   Musculoskeletal: No peripheral edema. Negative Homans bilaterally, no cords.   Skin: No rash.   Neuro: No focal neurologic deficits, NIH score of 0.    ED Course & MDM   Diagnoses as of 03/03/24 0903   RSV (acute bronchiolitis due to respiratory syncytial virus)   COPD exacerbation (CMS/[facility])   Acute hypoxic respiratory failure (CMS/[facility])     Labs Reviewed   CBC WITH AUTO DIFFERENTIAL - Abnormal       Result Value    WBC 6.8      nRBC 0.0      RBC 3.87 (*)     Hemoglobin 14.2      Hematocrit 41.9       (*)     MCH 36.7 (*)     MCHC  33.9      RDW 12.8      Platelets 145 (*)     Neutrophils % 78.8      Immature Granulocytes %, Automated 0.3      Lymphocytes % 12.3      Monocytes % 8.1      Eosinophils % 0.1      Basophils % 0.4      Neutrophils Absolute 5.31      Immature Granulocytes Absolute, Automated 0.02      Lymphocytes Absolute 0.83      Monocytes Absolute 0.55      Eosinophils Absolute 0.01      Basophils Absolute 0.03     COMPREHENSIVE METABOLIC PANEL - Abnormal    Glucose 124 (*)     Sodium 132 (*)     Potassium 4.3      Chloride 93 (*)     Bicarbonate 32      Anion Gap 11      Urea Nitrogen 9      Creatinine 0.72      eGFR >90      Calcium 9.4      Albumin 4.8      Alkaline Phosphatase 59      Total Protein 7.3      AST 25      Bilirubin, Total 0.5      ALT 17     B-TYPE NATRIURETIC PEPTIDE - Abnormal     (*)     Narrative:        <100 pg/mL - Heart failure unlikely  100-299 pg/mL - Intermediate probability of acute heart                  failure exacerbation. Correlate with clinical                  context and patient history.    >=300 pg/mL - Heart Failure likely. Correlate with clinical                  context and patient history.    BNP testing is performed using different testing methodology at [facility] than at other [facility]. Direct result comparisons should only be made within the same method.      BLOOD GAS ARTERIAL FULL PANEL - Abnormal    POCT pH, Arterial 7.31 (*)     POCT pCO2, Arterial 63 (*)     POCT pO2, Arterial 206 (*)     POCT SO2, Arterial 100      POCT Oxy Hemoglobin, Arterial 96.7      POCT Hematocrit Calculated, Arterial 41.0      POCT Sodium, Arterial 127 (*)     POCT Potassium, Arterial 4.1      POCT Chloride, Arterial 95 (*)     POCT Ionized Calcium, Arterial 1.18      POCT Glucose, Arterial 123 (*)     POCT Lactate, Arterial 0.7      POCT Base Excess, Arterial 3.6 (*)     POCT HCO3 Calculated, Arterial 31.7 (*)     POCT Hemoglobin, Arterial 13.6      POCT Anion Gap, Arterial 4  (*)     Patient Temperature        FiO2 50     RSV PCR - Abnormal    RSV PCR Detected (*)     Narrative:     This assay is an FDA-cleared, in vitro diagnostic nucleic acid amplification test for the detection of RSV from nasopharyngeal specimens, and has been validated for use at [facility]. Negative results do not preclude RSV infections, and should not be used as the sole basis for diagnosis, treatment, or other management decisions. If Influenza A/B and RSV PCR results are negative, testing for Parainfluenza virus, Adenovirus and Metapneumovirus is routinely performed for pediatric oncology and intensive care inpatients at [facility] and is available on other patients by placing an add-on request.       SARS-COV-2 PCR - Normal    Coronavirus 2019, PCR Not Detected      Narrative:     This assay has received FDA Emergency Use Authorization (EUA) and is only authorized for the duration of time that circumstances exist to justify the authorization of the emergency use of in vitro diagnostic tests for the detection of SARS-CoV-2 virus and/or diagnosis of COVID-19 infection under section 564(b)(1) of the Act, 21 U.S.C. 360bbb-3(b)(1). This assay is an in vitro diagnostic nucleic acid amplification test for the qualitative detection of SARS-CoV-2 from nasopharyngeal specimens and has been validated for use at [facility]. Negative results do not preclude COVID-19 infections and should not be used as the sole basis for diagnosis, treatment, or other management decisions.     INFLUENZA A AND B PCR - Normal    Flu A Result Not Detected      Flu B Result Not Detected      Narrative:     This assay is an in vitro diagnostic multiplex nucleic acid amplification test for the detection and discrimination of Influenza A & B from nasopharyngeal specimens, and has been validated for use at [facility]. Negative results do not preclude Influenza A/B infections, and  should not be used as the sole basis for diagnosis, treatment, or other management decisions. If Influenza A/B and RSV PCR results are negative, testing for Parainfluenza virus, Adenovirus and Metapneumovirus is routinely performed for [facility] pediatric oncology and intensive care inpatients, and is available on other patients by placing an add-on request.   SERIAL TROPONIN-INITIAL - Normal    Troponin I, High Sensitivity 11      Narrative:     Less than 99th percentile of normal range cutoff-  Female and children under 18 years old <14 ng/L; Male <21 ng/L: Negative  Repeat testing should be performed if clinically indicated.     Female and children under 18 years old 14-50 ng/L; Male 21-50 ng/L:  Consistent with possible cardiac damage and possible increased clinical   risk. Serial measurements may help to assess extent of myocardial damage.     >50 ng/L: Consistent with cardiac damage, increased clinical risk and  myocardial infarction. Serial measurements may help assess extent of   myocardial damage.      NOTE: Children less than 1 year old may have higher baseline troponin   levels and results should be interpreted in conjunction with the overall   clinical context.     NOTE: Troponin I testing is performed using a different   testing methodology at [facility] than at other   [facility]. Direct result comparisons should only   be made within the same method.   SERIAL TROPONIN, 1 HOUR - Normal    Troponin I, High Sensitivity 11      Narrative:     Less than 99th percentile of normal range cutoff-  Female and children under 18 years old <14 ng/L; Male <21 ng/L: Negative  Repeat testing should be performed if clinically indicated.     Female and children under 18 years old 14-50 ng/L; Male 21-50 ng/L:  Consistent with possible cardiac damage and possible increased clinical   risk. Serial measurements may help to assess extent of myocardial damage.     >50 ng/L: Consistent with cardiac damage,  increased clinical risk and  myocardial infarction. Serial measurements may help assess extent of   myocardial damage.      NOTE: Children less than 1 year old may have higher baseline troponin   levels and results should be interpreted in conjunction with the overall   clinical context.     NOTE: Troponin I testing is performed using a different   testing methodology at [facility] than at other   [facility] hospitals. Direct result comparisons should only   be made within the same method.   TROPONIN SERIES- (INITIAL, 1 HR)    Narrative:     The following orders were created for panel order Troponin I Series, High Sensitivity (0, 1 HR).  Procedure                               Abnormality         Status                     ---------                               -----------         ------                     Troponin I, High Sensiti...[[id number]]  Normal              Final result               Troponin, High Sensitivi...[[id number]]  Normal              Final result                 Please view results for these tests on the individual orders.   GRAY TOP    Extra Tube Hold for add-ons.          XR chest 1 view   Final Result   No acute cardiopulmonary disease.  Chronic changes suggesting COPD.   Signed by Drake Scales             Medical Decision Making  Reviewed recent Cath:   CONCLUSIONS:   1. Distal Left Main: 10-30% stenosis.   2. Left Main Coronary Artery: This artery is irregular.   3. Left Anterior Descending Artery: presents luminal irregularities.   4. Mid LAD Lesion: The percent stenosis is 50%.   5. Circumflex Coronary Artery: contains patent previously placed stents and presents luminal irregularities.   6. Right Coronary Artery: presents luminal irregularities and contains patent previously placed stents.   7. Proximal RCA Lesion: The percent stenosis is 50%.   8. Posterior Lateral Branch RCA Lesion: The percent stenosis is 75%.   9. The Left Ventricular Ejection Fraction is 35%.    Patient  received medications by EMS, given 1 albuterol followed by 3 DuoNebs, 125 IV Solu-Medrol and 2 g of IV magnesium.    Saturations improved after the above-mentioned medications, patient on a 50% Venturi mask.    EKG at 6:43 AM with ventricular rate of 84, as interpreted by me, shows a sinus rhythm with occasional PVC and PACs.  Nonspecific ST abnormality, no evidence of acute ischemia or other acute findings.    Patient's respiratory exam did improve.  ABG was obtained with pH of 7.31, pCO2 of 63, pO2 of 206 on the 50% Venturi mask.  Patient was titrated down to 3 L via nasal cannula.  Monitored in the department for some time.  We did attempt to remove oxygen as the patient does not wear oxygen at home, although he quickly desatted to 85% on room air while in the bed.  Was placed back on oxygen.    Ultimately will require hospitalization    Procedure  Procedures     JOSE Reed  03/03/24 0649       JOSE Reed  03/03/24 0903

## 2024-03-03 NOTE — Clinical Note
Huddle and Timeout completed together with team. Patient wristband and [employer] information verified.

## 2024-03-04 NOTE — CONSULTS
[facility] Nephrology  Consult Note           Reason for Consult:  hyponatremia   Requesting Physician:  Dr. Rodas    Chief Complaint:  SOB  History Obtained From:  patient, electronic medical record    History of Present Ilness:    73 y.o. male with history s/f CAD s/p PCI, ICM s/p ICD placement,PVD s/p RT AKA, HLD, HTN, COPD who presented w/ SOB for 1 day. No improvement w/ breathing rxs. On arrival of EMS was satting 80% on RA. Associated w/ chest pain. On presentation pt hypertensive, CXR showing chronic COPD and pt positive for RSV. Na 132 on presentation however now down to 126. Function wnl. Got lasix 20 mg yesterday up to 40 mg today. Also on lisinopril as outpatient.     Past Medical History:    Past Medical History:   Diagnosis Date    Anesthesia of skin     Numbness    Angina, class IV (CMS/[facility])     CHF (congestive heart failure) (CMS/[facility])     Chronic obstructive pulmonary disease with (acute) exacerbation (CMS/[facility]) 12/08/2022    Acute exacerbation of chronic obstructive pulmonary disease (COPD)    Coronary artery disease     Cough, unspecified     Cough    Disorder of prostate, unspecified     Prostate disease    Encounter for general adult medical examination without abnormal findings 12/09/2021    Encounter for Medicare annual wellness exam    Encounter for immunization 10/16/2020    Encounter for immunization    Functional dyspepsia     Indigestion    Hypertension     Ischemic cardiomyopathy     Local infection of the skin and subcutaneous tissue, unspecified 09/08/2020    Staph skin infection    Mixed hyperlipidemia 12/09/2021    Mixed hyperlipidemia    Muscle weakness (generalized)     Localized muscle weakness    Nicotine dependence, cigarettes, uncomplicated 10/16/2020    Heavy cigarette smoker    Other iron deficiency anemias 10/11/2021    Hypochromic erythrocytes    Other spondylosis, cervical region 03/16/2022    Other spondylosis, cervical region    Pain in leg, unspecified 03/16/2022    Leg  pain    Personal history of other diseases of the circulatory system     History of cardiac disorder    Personal history of other diseases of the circulatory system     History of hypertension    Personal history of other diseases of the circulatory system 12/09/2021    History of class IV angina pectoris    Personal history of other diseases of the circulatory system 03/16/2022    History of vascular disorder    Personal history of other diseases of the musculoskeletal system and connective tissue     History of arthritis    Personal history of other diseases of the musculoskeletal system and connective tissue 03/16/2022    History of low back pain    Personal history of other diseases of the musculoskeletal system and connective tissue 03/16/2022    History of neck pain    Personal history of other specified conditions     History of nausea    Personal history of other specified conditions     History of chest pain    Personal history of other specified conditions     History of heartburn    Postnasal drip     Post-nasal drip    Pseudofolliculitis barbae 02/27/2018    Pseudofolliculitis farrukh    Shortness of breath     Shortness of breath at rest    Snoring     Snoring       Past Surgical History:    Past Surgical History:   Procedure Laterality Date    CARDIAC CATHETERIZATION Left 1/15/2024    Procedure: Left Heart Cath;  Surgeon: Zen Suarez MD;  Location: [facility];  Service: Cardiovascular;  Laterality: Left;  [facility] possible. AKA on right side    CORONARY ANGIOPLASTY WITH STENT PLACEMENT  04/27/2017    Cath Placement Of Stent 1    OTHER SURGICAL HISTORY  11/09/2021    Tooth extraction    OTHER SURGICAL HISTORY  11/09/2021    Leg surgery    OTHER SURGICAL HISTORY  11/09/2021    Cardioverter defibrillator insertion    OTHER SURGICAL HISTORY  11/09/2021    Amputation Of Leg Above Knee    OTHER SURGICAL HISTORY  04/03/2019    Cardiac catheterization with stent placement       Home Medications:    No  current facility-administered medications on file prior to encounter.     Current Outpatient Medications on File Prior to Encounter   Medication Sig Dispense Refill    albuterol 2.5 mg /3 mL (0.083 %) nebulizer solution USE 1 UNIT DOSE EVERY 4-6 HOURS AS NEEDED FOR WHEEZING .      albuterol 90 mcg/actuation inhaler Inhale 2 puffs by mouth up to every 4 hours as needed for shortness of breath, wheezing, or prior to exercise.      amLODIPine (Norvasc) 5 mg tablet Take 1 tablet (5 mg) by mouth once daily.      aspirin 81 mg chewable tablet Chew 1 tablet (81 mg) once daily.      atorvastatin (Lipitor) 40 mg tablet Take 1 tablet (40 mg) by mouth once daily at bedtime.      cholecalciferol (Vitamin D-3) 25 MCG (1000 UT) tablet TAKE AS DIRECTED.      clopidogrel (Plavix) 75 mg tablet Take 1 tablet (75 mg) by mouth once daily.      furosemide (Lasix) 20 mg tablet Take 1 tablet (20 mg) by mouth once daily.      gabapentin (Neurontin) 300 mg capsule take 2-4 capsules BY MOUTH THREE TIMES DAILY AS DIRECTED 1080 capsule 3    lisinopril 5 mg tablet Take 1 tablet (5 mg) by mouth once daily.      methadone (Dolophine) 5 mg tablet Take 1 tablet (5 mg) by mouth every 6 hours.      methadone (Dolophine) 5 mg tablet Take 1 tablet (5 mg) by mouth every 6 hours if needed for severe pain (7 - 10). Do not start before February 11, 2024. 120 tablet 0    metoprolol succinate XL (Toprol-XL) 50 mg 24 hr tablet TAKE 1 TABLET BY MOUTH DAILY IN THE EVENING 90 tablet 3    naloxone (Narcan) 4 mg/0.1 mL nasal spray USE 1 SPRAY IN ONE NOSTRILONCE AS NEEDED FOR OVERDOSE SYMPTOMS. MAY REPEAT DOSE IF NEEDED IN 2-3 MINUTES IN YOUR OTHER NOSTRIL.      nitroglycerin (Nitrostat) 0.4 mg SL tablet DISSOLVE 1 TABLET UNDER THE TONGUE AS NEEDED FOR CHEST PAIN- MAY REPEA      pantoprazole (ProtoNix) 40 mg EC tablet TAKE ONE TABLET BY MOUTH DAILY 90 tablet 3    [DISCONTINUED] sildenafil (Viagra) 100 mg tablet TAKE 1/2 to 1 whole TABLET 1 HOUR BEFORE NEEDED          Allergies:  Morphine and Simvastatin    Social History:    Social History     Socioeconomic History    Marital status:      Spouse name: Not on file    Number of children: Not on file    Years of education: Not on file    Highest education level: Not on file   Occupational History    Not on file   Tobacco Use    Smoking status: Every Day     Packs/day: .2     Types: Cigarettes    Smokeless tobacco: Never   Substance and Sexual Activity    Alcohol use: Not Currently    Drug use: Never    Sexual activity: Defer   Other Topics Concern    Not on file   Social History Narrative    Not on file     Social Determinants of Health     Financial Resource Strain: Low Risk  (3/3/2024)    Overall Financial Resource Strain (CARDIA)     Difficulty of Paying Living Expenses: Not hard at all   Food Insecurity: Not on file   Transportation Needs: No Transportation Needs (3/3/2024)    PRAPARE - Transportation     Lack of Transportation (Medical): No     Lack of Transportation (Non-Medical): No   Physical Activity: Not on file   Stress: Not on file   Social Connections: Not on file   Intimate Partner Violence: Not on file   Housing Stability: Low Risk  (3/3/2024)    Housing Stability Vital Sign     Unable to Pay for Housing in the Last Year: No     Number of Places Lived in the Last Year: 1     Unstable Housing in the Last Year: No       Family History:   Family History   Problem Relation Name Age of Onset    Alzheimer's disease Mother  74    Other (old age) Father  81       Review of Systems:   Positives in bold  Constitutional: fever, chills, fatigue, malaise   HENT:  rhinorrhea, sinus pain, sore throat, epistaxis    Eyes:  photophobia, visual disturbance, eye redness  Respiratory: shortness of breath, cough, hemoptysis    Cardiovascular: chest pain, palpitations, orthopnea, leg swelling   Gastrointestinal: abdominal pain, nausea, vomiting, diarrhea, constipation   Endocrine: polydipsia, polyphagia, cold intolerance,  "heat intolerance  Genitourinary: dysuria, flank pain, frequency, urgency   Hematologic: easy bruising, easy bleeding  Musculoskeletal: back pain, neck pain, myalgias, arthalgias  Neurological: syncope, lightheadedness, dizziness, seizures, tremors, weakness  Psychiatric/Behavioral: anxiety, depression, hallucinations  Skin: rash, itching    Physical exam:   Constitutional:  VITALS:  /79   Pulse 75   Temp 36.8 °C (98.2 °F)   Resp 17   Ht 1.626 m (5' 4\")   Wt (!) 43.1 kg (95 lb)   SpO2 97%   BMI 16.31 kg/m²     General: lethargic, in no apparent distress  HEENT: normocephalic, atraumatic, anicteric  Neck: supple, no mass  Lungs: non-labored respirations, clear to auscultation bilaterally  Heart: regular rate and rhythm, no murmurs or rubs  Abdomen: soft, non-tender, non-distended  MSK: no joint swelling or tenderness  Ext: no cyanosis, no peripheral edema, RT AKA  Neuro: lethargic  Psych: unable to assess     Data/  CBC:   Lab Results   Component Value Date    WBC 8.9 03/04/2024    RBC 3.74 (L) 03/04/2024    HGB 13.7 03/04/2024    HCT 40.8 (L) 03/04/2024     (H) 03/04/2024    MCH 36.6 (H) 03/04/2024    MCHC 33.6 03/04/2024    RDW 12.7 03/04/2024     (L) 03/04/2024     BMP:    Lab Results   Component Value Date     (L) 03/04/2024    K 5.2 03/04/2024    CL 86 (L) 03/04/2024    CO2 34 (H) 03/04/2024    BUN 16 03/04/2024    CREATININE 0.61 03/04/2024    CALCIUM 9.7 03/04/2024    GLUCOSE 106 (H) 03/04/2024       Assessment:  73 y.o. male with history s/f CAD s/p PCI, ICM s/p ICD placement,PVD s/p RT AKA, HLD, HTN, COPD who presented w/ SOB for 1 day.     Hyponatremia:L 2/2 volume depletion, Na trending down since admission, was getting dose of lasix   Hypertensive urgency  RSV infection   Large infrarenal AAA w/ mural thrombus: on AC   Encephalopathy     Plan:  - keep off lasix  - if no improvement give IVFs  - possibly going to ICU     Thank you for the consultation. Will continue to " follow  Please do not hesitate to call with questions.    Kit Adkins MD

## 2024-03-04 NOTE — CONSULTS
[facility] Critical Care Medicine       Date:  3/4/2024  Patient:  Luis Peacock Jr.  YOB: 1950  MRN:  [id number]   Admit Date:  3/3/2024  ========================================================================================================    Chief Complaint   Patient presents with    Shortness of Breath     80% in RA, hx of CPOD, smoker, HF. Squad gave 2g Mg, 125 solumedrol and 3 duonebs         History of Present Illness:  Luis Peacock Jr. is a 73 y.o. male, from home with a past medical history of CAD status post PCI, ischemic cardiomyopathy status post ICD, systolic CHF, PVD status post Rt AKA, HTN, HLD, COPD, who presented with worsening shortness of breath after he woke up. He tried to use his home breathing treatment but no improvement. Called EMS and o their arrival he was saturating 80% on RA. They gave him an albuterol which did not improve him so they placed him on 3 DuoNeb treatments, given 125 of IV Solu-Medrol and 2 g of IV magnesium. Pt also is complaining of abdominal pain, and chest discomfort and is asking for relief of his distress. Pt denies LOC, bleeding, nausea, vomiting, diarrhea, constipation, urinary symptoms. Patient is from home and ambulates with crutches and wheelchair, lives with his family     ED course: /108, HR 85, RR 22, afebrile, O2 sat 90% on supplemental oxygen  Labs: Hyponatremia 132, positive for RSV, CXR positive for chronic COPD  EKG: Sinus rhythm with occasional PVC, nonspecific ST abnormalities  Was continued on oxygen in the ER however had respiratory distress and desatting even with exertion.  He was given Zofran and Dilaudid in the ER.     Patient was admitted 3/3/24 for management of acute hypoxic respiratory failure in the setting of COPD exacerbation due to RSV upper respiratory tract infection.  He was initially maintained/treated on the floor for the first 24 hours of his admission.  A CT scan was obtained which showed an abdominal aortic  aneurysm.  He then underwent a CT angio of his abdomen and pelvis which confirmed the abdominal aneurysm with concern for intramural thrombus.  Vascular surgery was consulted and the patient was started on a heparin drip.  His PTT was noted to be supratherapeutic and this was held.  However upon return from CAT scan the patient was noted to be more lethargic.  Arterial blood gas was obtained which showed acute respiratory acidosis and the patient was initiated on BiPAP.  ICU was contacted to evaluate the patient for transfer given his worsening respiratory status and need for noninvasive ventilation.      Interval ICU Events:    3/4/2024: Patient transferred to the ICU for worsening hypercapnia and somnolence.    Medical History:  Past Medical History:   Diagnosis Date    Anesthesia of skin     Numbness    Angina, class IV (CMS/[facility])     CHF (congestive heart failure) (CMS/[facility])     Chronic obstructive pulmonary disease with (acute) exacerbation (CMS/[facility]) 12/08/2022    Acute exacerbation of chronic obstructive pulmonary disease (COPD)    Coronary artery disease     Cough, unspecified     Cough    Disorder of prostate, unspecified     Prostate disease    Encounter for general adult medical examination without abnormal findings 12/09/2021    Encounter for Medicare annual wellness exam    Encounter for immunization 10/16/2020    Encounter for immunization    Functional dyspepsia     Indigestion    Hypertension     Ischemic cardiomyopathy     Local infection of the skin and subcutaneous tissue, unspecified 09/08/2020    Staph skin infection    Mixed hyperlipidemia 12/09/2021    Mixed hyperlipidemia    Muscle weakness (generalized)     Localized muscle weakness    Nicotine dependence, cigarettes, uncomplicated 10/16/2020    Heavy cigarette smoker    Other iron deficiency anemias 10/11/2021    Hypochromic erythrocytes    Other spondylosis, cervical region 03/16/2022    Other spondylosis, cervical region    Pain in leg,  unspecified 03/16/2022    Leg pain    Personal history of other diseases of the circulatory system     History of cardiac disorder    Personal history of other diseases of the circulatory system     History of hypertension    Personal history of other diseases of the circulatory system 12/09/2021    History of class IV angina pectoris    Personal history of other diseases of the circulatory system 03/16/2022    History of vascular disorder    Personal history of other diseases of the musculoskeletal system and connective tissue     History of arthritis    Personal history of other diseases of the musculoskeletal system and connective tissue 03/16/2022    History of low back pain    Personal history of other diseases of the musculoskeletal system and connective tissue 03/16/2022    History of neck pain    Personal history of other specified conditions     History of nausea    Personal history of other specified conditions     History of chest pain    Personal history of other specified conditions     History of heartburn    Postnasal drip     Post-nasal drip    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 AKA on right side    CORONARY ANGIOPLASTY WITH STENT PLACEMENT  04/27/2017    Cath Placement Of Stent 1    OTHER SURGICAL HISTORY  11/09/2021    Tooth extraction    OTHER SURGICAL HISTORY  11/09/2021    Leg surgery    OTHER SURGICAL HISTORY  11/09/2021    Cardioverter defibrillator insertion    OTHER SURGICAL HISTORY  11/09/2021    Amputation Of Leg Above Knee    OTHER SURGICAL HISTORY  04/03/2019    Cardiac catheterization with stent placement     Medications Prior to  Admission   Medication Sig Dispense Refill Last Dose    albuterol 2.5 mg /3 mL (0.083 %) nebulizer solution USE 1 UNIT DOSE EVERY 4-6 HOURS AS NEEDED FOR WHEEZING .   3/2/2024    albuterol 90 mcg/actuation inhaler Inhale 2 puffs by mouth up to every 4 hours as needed for shortness of breath, wheezing, or prior to exercise.   3/2/2024    amLODIPine (Norvasc) 5 mg tablet Take 1 tablet (5 mg) by mouth once daily.   3/2/2024    aspirin 81 mg chewable tablet Chew 1 tablet (81 mg) once daily.   3/2/2024    atorvastatin (Lipitor) 40 mg tablet Take 1 tablet (40 mg) by mouth once daily at bedtime.   3/2/2024    cholecalciferol (Vitamin D-3) 25 MCG (1000 UT) tablet TAKE AS DIRECTED.   3/2/2024    clopidogrel (Plavix) 75 mg tablet Take 1 tablet (75 mg) by mouth once daily.   3/2/2024    furosemide (Lasix) 20 mg tablet Take 1 tablet (20 mg) by mouth once daily.   3/2/2024    gabapentin (Neurontin) 300 mg capsule take 2-4 capsules BY MOUTH THREE TIMES DAILY AS DIRECTED 1080 capsule 3 3/2/2024    lisinopril 5 mg tablet Take 1 tablet (5 mg) by mouth once daily.   3/2/2024    methadone (Dolophine) 5 mg tablet Take 1 tablet (5 mg) by mouth every 6 hours.   3/2/2024    methadone (Dolophine) 5 mg tablet Take 1 tablet (5 mg) by mouth every 6 hours if needed for severe pain (7 - 10). Do not start before February 11, 2024. 120 tablet 0     metoprolol succinate XL (Toprol-XL) 50 mg 24 hr tablet TAKE 1 TABLET BY MOUTH DAILY IN THE EVENING 90 tablet 3 3/2/2024    naloxone (Narcan) 4 mg/0.1 mL nasal spray USE 1 SPRAY IN ONE NOSTRILONCE AS NEEDED FOR OVERDOSE SYMPTOMS. MAY REPEAT DOSE IF NEEDED IN 2-3 MINUTES IN YOUR OTHER NOSTRIL.   Unknown    nitroglycerin (Nitrostat) 0.4 mg SL tablet DISSOLVE 1 TABLET UNDER THE TONGUE AS NEEDED FOR CHEST PAIN- MAY REPEA   3/2/2024    pantoprazole (ProtoNix) 40 mg EC tablet TAKE ONE TABLET BY MOUTH DAILY 90 tablet 3 3/2/2024     Morphine and Simvastatin  Social History     Tobacco Use    Smoking status:  Every Day     Packs/day: .2     Types: Cigarettes    Smokeless tobacco: Never   Substance Use Topics    Alcohol use: Not Currently    Drug use: Never     Family History   Problem Relation Name Age of Onset    Alzheimer's disease Mother  74    Other (old age) Father  81       Review of Systems:  14 point review of systems was completed and negative except for those specially mention in my HPI    Physical Exam:    Heart Rate:  [75-91]   Temp:  [36.7 °C (98.1 °F)-37.1 °C (98.8 °F)]   Resp:  [17-20]   BP: (136-183)/(71-98)   SpO2:  [85 %-100 %]     Physical Exam  Vitals and nursing note reviewed.   Constitutional:       Appearance: He is ill-appearing.      Comments: Somnolent, arousable to voice and pain but quickly falls back asleep   HENT:      Head: Normocephalic and atraumatic.      Nose: Nose normal. No congestion.   Eyes:      Extraocular Movements: Extraocular movements intact.      Conjunctiva/sclera: Conjunctivae normal.   Cardiovascular:      Rate and Rhythm: Normal rate and regular rhythm.   Pulmonary:      Breath sounds: Wheezing present.      Comments: Tachypnea, poor air movement   Abdominal:      General: Abdomen is flat. There is no distension.      Tenderness: There is no abdominal tenderness.   Musculoskeletal:         General: No swelling or tenderness.      Cervical back: Neck supple.   Skin:     General: Skin is warm and dry.      Capillary Refill: Capillary refill takes less than 2 seconds.   Neurological:      Mental Status: He is disoriented.         Objective:    I have reviewed all medications, laboratory results, and imaging pertinent for today's encounter    Assessment/Plan:    I am currently managing this critically ill patient for the following problems:    Acute hypercapnic respiratory failure 2/2 COPD the exacerbation in the setting of RSV infection  Acute encephalopathy 2/2 CO2 narcosis  History of neuropathy  History of chronic pain on outpatient methadone  Acute COPD  exacerbation  Acute hypercapnia  Coronary artery disease last heart cath in feb 2024 showing 10 to 30% left main, 50% proximal LAD, patent circumflex and RCA stents, 50% proximal RCA stenosis, and 75% PLV B diffuse stenosis with recommendation for medical management   Ischemic cardiopathy s/p ICD placement  Systolic congestive heart failure (EF 30%) not in acute exacerbation  Severe peripheral vascular disease s/p right AKA  History of hypertension/hyperlipidemia  Hyponatremia  Abdominal AAA with intramural thrombus  Concern for left leg ischemia with findings of left iliac/femoral occlusion    Discussed with vascular surgery: Recommendation for heparin drip at this time.  Neurovascular checks of the left lower extremity    EP recommendations reviewed: Device interrogation with no abnormalities    ACS consulted for ? Abdominal wall hematoma but not apparent on recent CTA     Neuro/Psych/Pain Ctrl/Sedation:  Patient with acute encephalopathy 2/2 CO2 narcosis: Make n.p.o., hold gabapentin and other narcotics at this time.  Did receive Xanax today which may be contributing to his CO2 narcosis/somnolence.  Hold home methadone: Patient apparently does not wish to be on this medication, will need to elucidate further when he is more awake  CTH pending     Respiratory/ENT:  Continue BiPAP: Repeat ABG in 2 hours.  Titrate settings to obtain an adequate minute ventilation  3 stacked DuoNebs now and continue DuoNebs every 4 hours  Solu-Medrol 40 mg twice daily through 3/8    Cardiovascular:  Maintain MAP greater than 65, not currently on any vasoactive agents  Hold home aspirin/Plavix while n.p.o.  Hold home Lasix while n.p.o., can consider IV dosing however does not examine volume overloaded  Hold home statin, Norvasc, lisinopril, metoprolol while NPO.  As needed hydralazine available  Does not appear decompensated from heart failure standpoint, no need for repeat echocardiogram    GI:  N.p.o., can resume diet when more  awake  PPI daily while on anticoagulation    Renal/Volume Status (Intra & Extravascular):  Urine output/electrolytes acceptable, no need for RRT    Endocrine  Glycemic control adequate, sliding scale available    Infectious Disease:  Patient RSV positive, no evidence of superimposed bacterial infection at this time.  Monitor off antibiotics    Heme/Onc:  Resume heparin drip for mural thrombus/possible left iliac/femoral thrombus  Every hour neurovascular checks, close monitoring for acute limb ischemia      OBGYN/MSK:  N/A    Ethics/Code Status:  Full code    :  DVT Prophylaxis: Heparin drip  GI Prophylaxis: PPI  Diet: N.p.o.  CVC: N/A  Dee: N/A  [place]: N/A  Restraints: N/A  Dispo: ICU, critically ill    Critical Care Time: 45 minutes    Torres Feldman DO

## 2024-03-04 NOTE — CONSULTS
Inpatient consult to Cardiology  Consult performed by: JOSE Spear  Consult ordered by: Warner Rodas MD  Reason for consult: Evaluate pacemaker  Assessment/Recommendations: Patient was seen, chart reviewed.    Patient was admitted for shortness of breath.  Worsening COPD  RSV positive  Device interrogation.  No significant atrial or ventricular arrhythmias.  Device functioning well  Continue with cardiac medications including aspirin, statins, beta-blocker and hydralazine therapy  Continue telemetry  Please excuse any errors in grammar or translation related to this dictation.  Voice recognition software was utilized to prepare this document.        Electrophysiology Consult Note  Patient: Luis Peacock Jr.  Unit/Bed: 925/925-B  YOB: 1950  MRN: [id number]  Acct: [id number]   Admitting Diagnosis: COPD (chronic obstructive pulmonary disease) (CMS/[facility]) [J44.9]  RSV (acute bronchiolitis due to respiratory syncytial virus) [J21.0]  COPD exacerbation (CMS/[facility]) [J44.1]  Acute hypoxic respiratory failure (CMS/[facility]) [J96.01]  Date:  3/3/2024  Hospital Day: 1  Attending: Warner Rodas MD    Rounding Cardiologist:  JOSE Spear, Luis Fernando Pearson MD     Complaint:  Chief Complaint   Patient presents with    Shortness of Breath     80% in RA, hx of CPOD, smoker, HF. Squad gave 2g Mg, 125 solumedrol and 3 duonebs      History of Present Illness:  Patient is a 73-year-old male with past medical history significant for CAD, s/p remote PCI, ICM, chronic systolic heart failure, s/p remote DC ICD, COPD, PAD, s/p right AKA, methadone dependent, follows with pain management, HTN, HLD who presented to [facility] [place] emergency department via EMS on 3/3/2024 with a chief complaint of worsening shortness of breath.  Patient states he gets intermittent shortness of breath on a regular basis, however his breathing did not improve with his home breathing treatment.  Patient was  noted to have an O2 saturation of 80% on RA when EMS arrived.  Patient was given albuterol nebulizer with no improvement followed by 3 DuoNeb treatments, IV Solu-Medrol and IV magnesium.  Patient also complaining of abdominal pain and chest discomfort and was in distress upon arrival to the emergency department.    ED Course:   Vitals: /108, HR 85, RR 22, afebrile, O2 sat 90% on supplemental oxygen  Labs: Hyponatremia 132, positive for RSV, CXR positive for chronic COPD  EKG: Sinus rhythm with occasional PVC, nonspecific ST abnormalities  Was continued on oxygen in the ER however had respiratory distress and desatting even with exertion.  He was given Zofran and Dilaudid in the ER.     Patient was admitted for management of acute hypoxic respiratory failure in the setting of COPD exacerbation due to RSV upper respiratory tract infection.  Further work-up with abdominal CT reveals a large 7 cm infrarenal abdominal aortic aneurysm with mural thrombus for which vascular surgery is consulted.  Nephrology is consulted for management of hyponatremia.  The EP service is asked to evaluate patient's ICD.  Patient is maintaining a NSR 70-80's.  Device check has been ordered.    Allergies:  Allergies   Allergen Reactions    Morphine Itching    Simvastatin Itching      PMHx:  Past Medical History:   Diagnosis Date    Anesthesia of skin     Numbness    Angina, class IV (CMS/[facility])     CHF (congestive heart failure) (CMS/HCC)     Chronic obstructive pulmonary disease with (acute) exacerbation (CMS/[facility]) 12/08/2022    Acute exacerbation of chronic obstructive pulmonary disease (COPD)    Coronary artery disease     Cough, unspecified     Cough    Disorder of prostate, unspecified     Prostate disease    Encounter for general adult medical examination without abnormal findings 12/09/2021    Encounter for Medicare annual wellness exam    Encounter for immunization 10/16/2020    Encounter for immunization    Functional dyspepsia      Indigestion    Hypertension     Ischemic cardiomyopathy     Local infection of the skin and subcutaneous tissue, unspecified 09/08/2020    Staph skin infection    Mixed hyperlipidemia 12/09/2021    Mixed hyperlipidemia    Muscle weakness (generalized)     Localized muscle weakness    Nicotine dependence, cigarettes, uncomplicated 10/16/2020    Heavy cigarette smoker    Other iron deficiency anemias 10/11/2021    Hypochromic erythrocytes    Other spondylosis, cervical region 03/16/2022    Other spondylosis, cervical region    Pain in leg, unspecified 03/16/2022    Leg pain    Personal history of other diseases of the circulatory system     History of cardiac disorder    Personal history of other diseases of the circulatory system     History of hypertension    Personal history of other diseases of the circulatory system 12/09/2021    History of class IV angina pectoris    Personal history of other diseases of the circulatory system 03/16/2022    History of vascular disorder    Personal history of other diseases of the musculoskeletal system and connective tissue     History of arthritis    Personal history of other diseases of the musculoskeletal system and connective tissue 03/16/2022    History of low back pain    Personal history of other diseases of the musculoskeletal system and connective tissue 03/16/2022    History of neck pain    Personal history of other specified conditions     History of nausea    Personal history of other specified conditions     History of chest pain    Personal history of other specified conditions     History of heartburn    Postnasal drip     Post-nasal drip    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 AKA on right side    CORONARY ANGIOPLASTY WITH STENT PLACEMENT  04/27/2017    Cath Placement Of Stent 1    OTHER SURGICAL HISTORY  11/09/2021    Tooth extraction    OTHER SURGICAL HISTORY  11/09/2021    Leg surgery    OTHER SURGICAL HISTORY  11/09/2021    Cardioverter defibrillator insertion    OTHER SURGICAL HISTORY  11/09/2021    Amputation Of Leg Above Knee    OTHER SURGICAL HISTORY  04/03/2019    Cardiac catheterization with stent placement     Social Hx:  Current tobacco use  Denies alcohol use  Denies drug use    Family Hx:  Family History   Problem Relation Name Age of Onset    Alzheimer's disease Mother  74    Other (old age) Father  81     Review of Systems:   Review of Systems   Constitutional:  Positive for fatigue.   HENT: Negative.     Eyes: Negative.    Respiratory:  Positive for chest tightness, shortness of breath and wheezing.    Cardiovascular:  Positive for chest pain. Negative for palpitations.   Gastrointestinal:  Positive for abdominal pain.   Endocrine: Negative.    Genitourinary: Negative.    Musculoskeletal: Negative.         Chronic phantom pain/neuropathy   Skin: Negative.    Allergic/Immunologic: Negative.    Neurological:  Positive for weakness. Negative for dizziness and light-headedness.   Hematological: Negative.    Psychiatric/Behavioral: Negative.       Physical Examination:    Visit Vitals  /79   Pulse 75   Temp 36.8 °C (98.2 °F)   Resp 17      Physical Exam  Vitals reviewed.   Constitutional:       Appearance: Normal appearance.   HENT:      Head: Normocephalic and atraumatic.      Nose: Nose normal.      Mouth/Throat:      Mouth: Mucous membranes are moist.      Pharynx: Oropharynx is clear.   Eyes:      Pupils: Pupils are equal, round, and reactive to light.   Cardiovascular:      Rate and Rhythm: Normal rate and regular rhythm.      Pulses: Normal pulses.      Heart sounds: Normal heart sounds.    Pulmonary:      Effort: Respiratory distress present.      Breath sounds: Wheezing and rales present.   Abdominal:      General: Bowel sounds are normal.      Palpations: Abdomen is soft.   Musculoskeletal:         General: Normal range of motion.      Cervical back: Normal range of motion and neck supple.      Comments: Right AKA   Skin:     General: Skin is warm and dry.   Neurological:      General: No focal deficit present.      Mental Status: He is alert and oriented to person, place, and time.   Psychiatric:         Mood and Affect: Mood normal.         Behavior: Behavior normal.       LABS:  CBC:   Results from last 7 days   Lab Units 03/04/24  0539 03/03/24  0647 02/29/24  0818   WBC AUTO x10*3/uL 8.9 6.8 4.6   RBC AUTO x10*6/uL 3.74* 3.87* 3.97*   HEMOGLOBIN g/dL 13.7 14.2 14.5   HEMATOCRIT % 40.8* 41.9 44.1   MCV fL 109* 108* 111*   MCH pg 36.6* 36.7* 36.5*   MCHC g/dL 33.6 33.9 32.9   RDW % 12.7 12.8 13.3   PLATELETS AUTO x10*3/uL 126* 145* 168     CMP:    Results from last 7 days   Lab Units 03/04/24  0539 03/03/24  0647 02/29/24  0818   SODIUM mmol/L 126* 132* 138   POTASSIUM mmol/L 5.2 4.3 4.5   CHLORIDE mmol/L 86* 93* 96*   CO2 mmol/L 34* 32 33*   BUN mg/dL 16 9 10   CREATININE mg/dL 0.61 0.72 0.76   GLUCOSE mg/dL 106* 124* 111*   PROTEIN TOTAL g/dL  --  7.3 6.9   CALCIUM mg/dL 9.7 9.4 9.7   BILIRUBIN TOTAL mg/dL  --  0.5 0.5   ALK PHOS U/L  --  59 57   AST U/L  --  25 15   ALT U/L  --  17 11     Magnesium:  Results from last 7 days   Lab Units 02/29/24  0818   MAGNESIUM mg/dL 2.13     Troponin:    Results from last 7 days   Lab Units 03/03/24  0811 03/03/24  0647   TROPHS ng/L 11 11     BNP:   Results from last 7 days   Lab Units 03/03/24  0647   BNP pg/mL 376*     Lipid Panel:  Results from last 7 days   Lab Units 02/29/24  0818   HDL mg/dL 52.3   CHOLESTEROL/HDL RATIO  2.2   VLDL mg/dL 11   TRIGLYCERIDES mg/dL 53   NON HDL CHOL. mg/dL 64      Current Medications:    Current Facility-Administered  Medications:     acetaminophen (Tylenol) oral liquid 650 mg, 650 mg, oral, q4h PRN **OR** acetaminophen (Tylenol) tablet 650 mg, 650 mg, oral, q4h PRN, Warner Rodas MD, 650 mg at 03/04/24 0124    albuterol 90 mcg/actuation inhaler 2 puff, 2 puff, inhalation, q4h PRN, Warner Rodas MD, 2 puff at 03/04/24 0200    amLODIPine (Norvasc) tablet 5 mg, 5 mg, oral, Daily, Warner Rodas MD, 5 mg at 03/04/24 1019    aspirin chewable tablet 81 mg, 81 mg, oral, Daily, Warner Rodas MD, 81 mg at 03/04/24 1019    atorvastatin (Lipitor) tablet 40 mg, 40 mg, oral, Nightly, Warner Rodas MD, 40 mg at 03/03/24 2029    benzonatate (Tessalon) capsule 100 mg, 100 mg, oral, TID PRN, Warner Rodas MD, 100 mg at 03/03/24 2029    clopidogrel (Plavix) tablet 75 mg, 75 mg, oral, Daily, Warner Rodas MD, 75 mg at 03/04/24 1019    docusate sodium (Colace) capsule 100 mg, 100 mg, oral, BID, Warner Rodsa MD, 100 mg at 03/03/24 2029    furosemide (Lasix) tablet 40 mg, 40 mg, oral, Daily, Warner Rodas MD, 40 mg at 03/04/24 1019    gabapentin (Neurontin) capsule 600 mg, 600 mg, oral, BID, Warner Rodas MD, 600 mg at 03/04/24 1019    heparin (porcine) injection 2,000-4,000 Units, 2,000-4,000 Units, intravenous, q4h PRN, Warner Rodas MD    heparin 25,000 Units in dextrose 5% 250 mL (100 Units/mL) infusion (premix), 0-4,500 Units/hr, intravenous, Continuous, Warner Rodas MD, Last Rate: 7.8 mL/hr at 03/04/24 1041, 775 Units/hr at 03/04/24 1041    hydrALAZINE (Apresoline) injection 10 mg, 10 mg, intravenous, q6h PRN, Warner Rodas MD, 10 mg at 03/03/24 1714    ipratropium-albuteroL (Duo-Neb) 0.5-2.5 mg/3 mL nebulizer solution 3 mL, 3 mL, nebulization, q6h, Warner Rodas MD, 3 mL at 03/04/24 0811    lisinopril tablet 5 mg, 5 mg, oral, Daily, Warner Rodas MD, 5 mg at 03/04/24 1019    [Held by provider] methadone (Dolophine) tablet 5  mg, 5 mg, oral, q6h, Warner Rodas MD    methylPREDNISolone sod succinate (PF) (SOLU-Medrol) 40 mg/mL injection 40 mg, 40 mg, intravenous, q12h, Warner Rodas MD, 40 mg at 03/04/24 1018    metoprolol succinate XL (Toprol-XL) 24 hr tablet 50 mg, 50 mg, oral, q PM, Warner Rodas MD, 50 mg at 03/03/24 2029    morphine injection 1 mg, 1 mg, intravenous, q4h PRN, Warner Rodas MD, 1 mg at 03/04/24 1001    nitroglycerin (Nitrostat) SL tablet 0.4 mg, 0.4 mg, sublingual, q5 min PRN, Warner Rodas MD    oxygen (O2) therapy, , inhalation, Continuous PRN - O2/gases, MICAELA Reed-CNP, Start at 03/03/24 0648    oxygen (O2) therapy, , inhalation, Continuous PRN - O2/gases, Warner Rodas MD    pantoprazole (ProtoNix) EC tablet 40 mg, 40 mg, oral, Daily, Warner Rodas MD, 40 mg at 03/03/24 1224    polyethylene glycol (Glycolax, Miralax) packet 17 g, 17 g, oral, Daily, Warner Rodas MD, 17 g at 03/03/24 1501    ECG 12 Lead  Result Date: 3/4/2024  Normal sinus rhythm Normal ECG When compared with ECG of 04-MAR-2024 08:54, (unconfirmed) Premature supraventricular complexes are no longer Present Confirmed by Liset Sparks ([id number]) on 3/4/2024 9:11:25 AM    CT abdomen pelvis wo IV contrast  Result Date: 3/4/2024  Mid infrarenal abdominal aortic aneurysm measures 39 x 39 mm, involves the infrarenal aorta from 7 cm proximal to its bifurcation all the way to the bifurcation. There was no abdominal aortic aneurysm on CT abdomen and pelvis 25 July 2012, the most recent pertinent comparison exam. There is definitely mural thrombus within the abdominal aortic aneurysm on today's exam, some of which may be recent, although the attenuation does not suggest hyperacute blood. There are no acute blood products or inflammatory changes around the abdominal aortic aneurysm. Follow-up three-phase CTA without and with contrast in arterial and venous phases recommended for further  evaluation of the abdominal aortic aneurysm. It is for this finding I have activated the Yellow Alert associated with this exam   4.5 x 1.5 x 4.5 cm probable hematoma in the subcutaneous fat superficial to and separate from the left rectus bundle, left paramidline ventral abdominal wall, which I have annotated with a [place] on axial image 71 and sagittal image 83. Was this a subcutaneous injection site?   No other acute/subacute hemorrhage/hematoma anywhere else in the abdominal wall and none at all in the abdomen or pelvis proper   No acute appendicitis, diverticulitis, other colitis, bowel obstruction, perforation, abscess or free fluid   No acute pancreatitis, hydronephrosis/urinary stone or any other acute solid organ findings   No gallbladder or biliary duct dilation   MACRO: Critical Finding:  See findings. Notification was initiated on 3/4/2024 at 8:55 am by  Favian Christopher.  (**-YCF-**) Instructions:   Signed by: Favian Christopher 3/4/2024 8:56 AM Dictation workstation:   [id number]    ECG 12 Lead  Result Date: 3/4/2024  Normal sinus rhythm Normal ECG When compared with ECG of 03-MAR-2024 06:43, Premature ventricular complexes are no longer Present Premature atrial complexes are no longer Present Confirmed by Greg Moulton ([id number]) on 3/4/2024 7:53:15 AM    ECG 12 Lead  Result Date: 3/3/2024  Sinus rhythm with occasional Premature ventricular complexes and Premature atrial complexes Nonspecific ST abnormality Abnormal ECG When compared with ECG of 15-KELLY-2024 06:41, Sinus rhythm has replaced Electronic atrial pacemaker ST now depressed in Inferior leads See ED provider note for full interpretation and clinical correlation Confirmed by Maribel Bernal ([id number]) on 3/3/2024 1:02:58 PM    XR chest 1 view  Result Date: 3/3/2024  No acute cardiopulmonary disease.  Chronic changes suggesting COPD. Signed by Drake Scales     Encounter Date: 03/03/24   ECG 12 Lead   Result Value    Ventricular Rate 74    Atrial Rate 74     NH Interval 124    QRS Duration 86    QT Interval 396    QTC Calculation(Bazett) 439    P Axis 85    R Axis 76    T Axis 29    QRS Count 12    Q Onset 220    P Onset 158    P Offset 204    T Offset 418    QTC Fredericia 425    Narrative    Normal sinus rhythm  Normal ECG  When compared with ECG of 04-MAR-2024 08:54, (unconfirmed)  Premature supraventricular complexes are no longer Present  Confirmed by Liset Sparks ([id number]) on 3/4/2024 9:11:25 AM      Cardiac device check:  Good pacer sense, capture and impedances  No ventricular arrhythmias  No AT/AF noted    Tele monitoring:  SR 70s to 80s    Assessment/Plan:  Acute hypoxic respiratory failure/RSV/COPD exacerbation   -On droplet precautions   -Requiring increased oxygen needs via facemask   -Receiving Solu-Medrol/DuoNebs  2.   Large infrarenal abdominal aortic aneurysm with mural thrombus   -Vascular surgery consulted   -On IV heparin  3.   Chronic systolic heart failure/ischemic cardiomyopathy/history of VT   -LVEF 30 to 35% per echo 8/2/2023   -S/p remote DC ICD   -Device interrogation revealed pacer sense, capture and impedances   WNL.  No ventricular arrhythmias noted.  No AT/AF noted.   4.    Coronary artery disease   -S/p remote PCI  5.    Peripheral arterial disease   -S/p remote right AKA  6.    Methadone dependent    -For management of phantom pain   -Follows with pain management  7.    Benign essential hypertension  8.    Hyperlipidemia     Further recommendations per Dr. Pearson  Continue to monitor on telemetry.    Electronically signed by MICAELA Spear-CNP on 3/4/2024 at 10:58 AM

## 2024-03-04 NOTE — PROGRESS NOTES
03/04/24 1002   Discharge Planning   Living Arrangements Spouse/significant other   Support Systems Spouse/significant other   Assistance Needed none   Type of Residence Private residence   Patient expects to be discharged to: TBD     Chart reviewed, writer completed interdisciplinary rounds with with attending, bedside nurse. He is on a non-rebreather mask moaning. Not conversational at this time. Per nurse patient has not been able to sleep last evening. Attending reports patient has a abdominal aneurysm which she consulted vascular surgery and started him on heparin. He is a possible transfer to ICU monitoring closeley. Per nurse patient lives with significant other/ girlfriend. He uses crutches and wheelchair independently. Hx of right AKA. Needs pending medical plan of treatment. Care Transition team to monitor care progression and address needs/ concerns as identified. MARK Cowan

## 2024-03-04 NOTE — CONSULTS
Inpatient consult to Vascular Surgery  Consult performed by: Reyna Peacock, APRN-CNP  Consult ordered by: Warner Rodas MD  Reason for consult: New abdominal aorta aneurysm, pt has persistent abdominal pain.          Reason For Consult  New abdominal aorta aneurysm, pt has persistent abdominal pain.     History Of Present Illness  Luis Peacock Jr. is a 73 y.o. male with PMHx of HTN, HLD, CAD with hx of PCI, ICM (EF 30-35%) s/p AICD (2020), COPD, PAD s/p R AKA and multiple LLE interventions, and ongoing nicotine dependence who presented to [facility] 3/3 via EMS c/o progressive SOB and persistent abdominal pain. Per ED note, pt satting 80% on RA on EMS arrival. He was give Albuterol without improvement and then given Duoneb and Solumedrol en route to ED. On arrival, SPO2 90% on VM. Labs notable for no leukocytosis, mild hyponatremia (132), normal renal function, , and Troponin 11. Tested + for RSV. ABG with pH 7.31, pCO2 63, pO2 206, HCO3 31.7, and Lactate 0.7. CXR without acute cardiopulmonary process. CT abd/pelvis revealed a mid infrarenal AAA measuring 3.9 x 3.9 cm (new from 2012) with mural thrombus; CTA advised. Pt medicated for pain and nausea and was admitted to telemetry for further management COPD exacerbation 2/2 RSV. Vascular surgery has been asked to evaluate AAA.     Pt seen lying in bed. He is afebrile and HDS. However, now obtunded on BiPap; recent ABG showing acute respiratory acidosis: Ph 7.20, pCO2 91, pO2 151, HCO3 35.6. Pt transferred to ICU for further management. CTA C/A/P was obtained today to further evaluate infrarenal AAA. This revealed a 3.8cm infrarenal AAA with circumferential mural thrombus; no abnormal enhancement within the mural thrombus; no extravasation of contrast. Incidentally noted is severe stenosis v occlusion of L EIA and femoral artery. Unfortunately, unable to assess symptoms due to altered mental status. On exam, LLE is cool from distal shin to foot. Unable to  assess ROM or sensation. No wounds. Heparin drip is currently on hold due to high PTT. Findings reviewed with Dr. Benoit. Recommend resuming Heparin drip for possible LLE ischemia as labs allow; will reassess as mentation improves. Will obtain PVR/SENAIT. Thank you for this consult; we will continue to follow.      Past Medical History  He has a past medical history of Anesthesia of skin, Angina, class IV (CMS/[facility]), CHF (congestive heart failure) (CMS/HCC), Chronic obstructive pulmonary disease with (acute) exacerbation (CMS/[facility]) (12/08/2022), Coronary artery disease, Cough, unspecified, Disorder of prostate, unspecified, Encounter for general adult medical examination without abnormal findings (12/09/2021), Encounter for immunization (10/16/2020), Functional dyspepsia, Hypertension, Ischemic cardiomyopathy, Local infection of the skin and subcutaneous tissue, unspecified (09/08/2020), Mixed hyperlipidemia (12/09/2021), Muscle weakness (generalized), Nicotine dependence, cigarettes, uncomplicated (10/16/2020), Other iron deficiency anemias (10/11/2021), Other spondylosis, cervical region (03/16/2022), Pain in leg, unspecified (03/16/2022), Personal history of other diseases of the circulatory system, Personal history of other diseases of the circulatory system, Personal history of other diseases of the circulatory system (12/09/2021), Personal history of other diseases of the circulatory system (03/16/2022), Personal history of other diseases of the musculoskeletal system and connective tissue, Personal history of other diseases of the musculoskeletal system and connective tissue (03/16/2022), Personal history of other diseases of the musculoskeletal system and connective tissue (03/16/2022), Personal history of other specified conditions, Personal history of other specified conditions, Personal history of other specified conditions, Postnasal drip, 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    Surgical History  He has a past surgical history that includes Coronary angioplasty with stent (04/27/2017); Other surgical history (11/09/2021); Other surgical history (11/09/2021); Other surgical history (11/09/2021); Other surgical history (11/09/2021); Other surgical history (04/03/2019); and Cardiac catheterization (Left, 1/15/2024).     Social History  He reports that he has been smoking cigarettes. He has been smoking an average of .2 packs per day. He has never used smokeless tobacco. He reports that he does not currently use alcohol. He reports that he does not use drugs.    Family History  Family History   Problem Relation Name Age of Onset    Alzheimer's disease Mother  74    Other (old age) Father  81        Allergies  Morphine and Simvastatin    Review of Systems   Reason unable to perform ROS: altered mental status.        Physical Exam  Vitals and nursing note reviewed.   Constitutional:       Appearance: He is ill-appearing.      Comments: Obtunded. Mild distress.    HENT:      Head: Normocephalic and atraumatic.      Right Ear: External ear normal.      Left Ear: External ear normal.      Nose: Nose normal.      Mouth/Throat:      Mouth: Mucous membranes are dry.      Pharynx: Oropharynx is clear.   Eyes:      Extraocular Movements: Extraocular movements intact.      Pupils: Pupils are equal, round, and reactive to light.   Neck:      Comments: No JVD  Cardiovascular:      Rate and Rhythm: Normal rate and regular rhythm.   Pulmonary:      Comments: Mild respiratory distress on BiPap.   Diminished throughout.   Abdominal:      General: Abdomen is flat. Bowel sounds are normal.      Palpations: Abdomen is soft.   Musculoskeletal:      Cervical back: Normal range of motion and neck supple.      Comments: R AKA      Skin:     Comments: LLE cool from mid shin to foot. No wounds.    Neurological:      Comments: Obtunded. Unable to assess orientation. Minimal response to loud verbal stimuli and  "tactile stimuli.    Psychiatric:      Comments: Unable to assess           Last Recorded Vitals  Blood pressure 136/71, pulse 82, temperature 36.8 °C (98.2 °F), resp. rate 20, height 1.626 m (5' 4\"), weight (!) 43.1 kg (95 lb), SpO2 (!) 85 %.    Relevant Results  Scheduled medications  [Held by provider] amLODIPine, 5 mg, oral, Daily  [Held by provider] aspirin, 81 mg, oral, Daily  [Held by provider] atorvastatin, 40 mg, oral, Nightly  [Held by provider] clopidogrel, 75 mg, oral, Daily  [Held by provider] docusate sodium, 100 mg, oral, BID  [Held by provider] furosemide, 40 mg, oral, Daily  [Held by provider] gabapentin, 600 mg, oral, BID  ipratropium-albuteroL, 3 mL, nebulization, q4h  [Held by provider] lisinopril, 5 mg, oral, Daily  [Held by provider] methadone, 5 mg, oral, q6h  methylPREDNISolone sodium succinate (PF), 40 mg, intravenous, q12h  [Held by provider] metoprolol succinate XL, 50 mg, oral, q PM  pantoprazole, 40 mg, intravenous, Daily  [Held by provider] polyethylene glycol, 17 g, oral, Daily      Continuous medications  heparin, 0-4,500 Units/hr, Last Rate: 775 Units/hr (03/04/24 1041)  sodium chloride 0.9%, 75 mL/hr      PRN medications  PRN medications: [Held by provider] acetaminophen **OR** [Held by provider] acetaminophen, [Held by provider] benzonatate, heparin, hydrALAZINE, naloxone, [Held by provider] nitroglycerin, oxygen    Results for orders placed or performed during the hospital encounter of 03/03/24 (from the past 24 hour(s))   ECG 12 Lead   Result Value Ref Range    Ventricular Rate 82 BPM    Atrial Rate 82 BPM    IN Interval 126 ms    QRS Duration 76 ms    QT Interval 382 ms    QTC Calculation(Bazett) 446 ms    P Axis 85 degrees    R Axis 73 degrees    T Axis 5 degrees    QRS Count 13 beats    Q Onset 222 ms    P Onset 159 ms    P Offset 206 ms    T Offset 413 ms    QTC Fredericia 424 ms   CBC   Result Value Ref Range    WBC 8.9 4.4 - 11.3 x10*3/uL    nRBC 0.0 0.0 - 0.0 /100 WBCs    " RBC 3.74 (L) 4.50 - 5.90 x10*6/uL    Hemoglobin 13.7 13.5 - 17.5 g/dL    Hematocrit 40.8 (L) 41.0 - 52.0 %     (H) 80 - 100 fL    MCH 36.6 (H) 26.0 - 34.0 pg    MCHC 33.6 32.0 - 36.0 g/dL    RDW 12.7 11.5 - 14.5 %    Platelets 126 (L) 150 - 450 x10*3/uL   Basic Metabolic Panel   Result Value Ref Range    Glucose 106 (H) 74 - 99 mg/dL    Sodium 126 (L) 136 - 145 mmol/L    Potassium 5.2 3.5 - 5.3 mmol/L    Chloride 86 (L) 98 - 107 mmol/L    Bicarbonate 34 (H) 21 - 32 mmol/L    Anion Gap 11 10 - 20 mmol/L    Urea Nitrogen 16 6 - 23 mg/dL    Creatinine 0.61 0.50 - 1.30 mg/dL    eGFR >90 >60 mL/min/1.73m*2    Calcium 9.7 8.6 - 10.3 mg/dL   SST TOP   Result Value Ref Range    Extra Tube Hold for add-ons.    TSH   Result Value Ref Range    Thyroid Stimulating Hormone 0.32 (L) 0.44 - 3.98 mIU/L   ECG 12 Lead   Result Value Ref Range    Ventricular Rate 74 BPM    Atrial Rate 74 BPM    WV Interval 124 ms    QRS Duration 86 ms    QT Interval 396 ms    QTC Calculation(Bazett) 439 ms    P Axis 85 degrees    R Axis 76 degrees    T Axis 29 degrees    QRS Count 12 beats    Q Onset 220 ms    P Onset 158 ms    P Offset 204 ms    T Offset 418 ms    QTC Fredericia 425 ms   aPTT - baseline   Result Value Ref Range    aPTT 160 (HH) 27 - 38 seconds   Protime-INR   Result Value Ref Range    Protime 11.6 9.8 - 12.8 seconds    INR 1.0 0.9 - 1.1   Blood Gas Arterial Full Panel   Result Value Ref Range    POCT pH, Arterial 7.20 (LL) 7.38 - 7.42 pH    POCT pCO2, Arterial 91 (HH) 38 - 42 mm Hg    POCT pO2, Arterial 151 (H) 85 - 95 mm Hg    POCT SO2, Arterial 100 94 - 100 %    POCT Oxy Hemoglobin, Arterial 97.3 94.0 - 98.0 %    POCT Hematocrit Calculated, Arterial 41.0 41.0 - 52.0 %    POCT Sodium, Arterial 125 (L) 136 - 145 mmol/L    POCT Potassium, Arterial 5.1 3.5 - 5.3 mmol/L    POCT Chloride, Arterial 86 (L) 98 - 107 mmol/L    POCT Ionized Calcium, Arterial 1.21 1.10 - 1.33 mmol/L    POCT Glucose, Arterial 101 (H) 74 - 99 mg/dL     POCT Lactate, Arterial 0.9 0.4 - 2.0 mmol/L    POCT Base Excess, Arterial 4.4 (H) -2.0 - 3.0 mmol/L    POCT HCO3 Calculated, Arterial 35.6 (H) 22.0 - 26.0 mmol/L    POCT Hemoglobin, Arterial 13.5 13.5 - 17.5 g/dL    POCT Anion Gap, Arterial 9 (L) 10 - 25 mmo/L    Patient Temperature      FiO2 4 %    Apparatus CANNULA     Critical Called By LEANN     Critical Called To TAVARES     Critical Call Time 1319.0000     Critical Read Back Y     Site of Arterial Puncture Radial Left     Dejan's Test Positive    Ammonia   Result Value Ref Range    Ammonia 49 16 - 53 umol/L     CT angio chest abdomen pelvis    Result Date: 3/4/2024  Interpreted By:  Schoenberger, Joseph, STUDY: CT ANGIO CHEST ABDOMEN PELVIS; ;  3/4/2024 12:28 pm   INDICATION: Signs/Symptoms:infrarenal AAA`.   COMPARISON: None.   ACCESSION NUMBER(S): [id number]   ORDERING CLINICIAN: CIELO CLOUD   TECHNIQUE: CT examination was obtained from the thoracic inlet to the lesser trochanters before intravenous contrast and during the systemic arterial phase of intravenous contrast injection. 100 cc Omnipaque 350 were injected for the exam. Sagittal axial and coronal reformatted images were obtained. 3D volume rendered and MIP reconstructions of the contrast-enhanced images for CT angiogram protocol. Additionally, delayed images through the abdomen and pelvis were obtained.   FINDINGS: The thoracic aorta is normal in course and caliber with moderate atherosclerotic calcifications. Pulmonary arteries appear normal in caliber. There are moderate coronary atherosclerotic calcifications. There is no mural thickening of the thoracic aorta. There is infrarenal abdominal aortic aneurysm. It measures 3.7 cm in greatest diameter. It does have mural thrombus with some heterogeneous attenuation and there are a few areas of disruption of the rim calcification but there is no large adjacent hematoma. There are considerable atherosclerotic calcifications in both common and  external iliac vessels.   The there is normal opacification of the lumen of the thoracic aorta. There is no evidence for dissection. The visualized segments of the great vessel appear patent with no stenotic disease and mild atherosclerotic plaque.   The pulmonary arteries opacify normally within the limits of this exam. There is no filling defect in the main or lobar segmental pulmonary arteries to suggest a pulmonary embolus.   There is an infrarenal abdominal aortic aneurysm. There is circumferential mural thrombus about the aneurysm. There is no abnormal enhancement within the mural thrombus. No extravasation of contrast or adjacent hematoma is seen. The aneurysm extends to the aortic bifurcation but does not involve either common iliac artery. There is a outpatient from the right posterolateral wall of the aneurysm at the level of the neck below the level of the renal vessels. It measures approximately 1.5 cm. It is at the level of disrupted mural calcifications in the aneurysm. It is well-defined no adjacent hematoma but does not opacify on the arterial phase or delayed images.   The celiac trunk is patent. The there is atherosclerotic plaque in likely stenotic disease in its splenic artery branch. The a patent artery is replaced to the superior mesenteric artery with some atherosclerotic disease. The superior mesenteric artery is otherwise patent. The inferior mesenteric artery proximally is not seen. Single bilateral renal arteries appear patent.   The right common and external iliac arteries are occluded on arterial phase exam. On delayed phase images, there does appear to be opacification of the external iliac artery likely due to internal iliac branches reconstituting   There is extensive atherosclerotic plaque in the left common iliac artery. There is severe stenosis at the bifurcation resulting in severe stenosis of the proximal external iliac artery which is small diseased vessel which apparent  contiguous but severely stenotic disease along its course.   There is no pathologic enlargement of thoracic lymph nodes. There is severe findings of centrilobular emphysema. Chest wall soft tissue unremarkable other than an implanted ICD/pacemaker. There is no aggressive osteolytic or osteoblastic lesion in the thoracic spine or bony structures of the thorax. There are some degenerative changes.   The liver and spleen and adrenal glands and pancreas and kidneys are grossly unremarkable. There is no distention or mural thickening of bowel loops. There is no hydroureteral nephrosis or ureter stone. The prostate is markedly enlarged. There appear to be large bilateral hydroceles in the visualized parts of the scrotum.   No mass or adenopathy or focal fluid collection is noted in the abdomen or pelvis.   Abdominal wall structures appear grossly intact. There are degenerative changes in the lumbar spine. No aggressive osteolytic or osteoblastic lesion is apparent.       No evidence of acute pulmonary embolus or thoracic aortic dissection or thoracic stenotic disease. There is significant coronary atherosclerosis elbow exam parameters are not optimized to assess coronary vessels.   There is no abdominal aortic aneurysm. There is considerable mural thrombus. Near the top of the aneurysm below the level of the renal vessels there is a small right posterolateral outpouching which appears thrombosis on both delayed and arterial phase image.   There is occlusion of the right common iliac artery. There is severe stenotic disease of the left external iliac artery.   Extensive findings of centrilobular emphysema.   See detailed discussion above.     MACRO: None   Signed by: Joseph Schoenberger 3/4/2024 1:11 PM Dictation workstation:   [id number]    ECG 12 Lead    Result Date: 3/4/2024  Normal sinus rhythm Normal ECG When compared with ECG of 04-MAR-2024 08:54, (unconfirmed) Premature supraventricular complexes are no longer  Present Confirmed by Liset Sparks ([id number]) on 3/4/2024 9:11:25 AM    CT abdomen pelvis wo IV contrast    Result Date: 3/4/2024  Interpreted By:  Favian Christopher, STUDY: CT ABDOMEN PELVIS WO IV CONTRAST;  3/3/2024 1:01 pm   INDICATION: Signs/Symptoms:Abdominal pain.   COMPARISON: None.   ACCESSION NUMBER(S): [id number]   ORDERING CLINICIAN: JOSE DUARTE   TECHNIQUE: CT of the abdomen and pelvis from the lung bases through the symphysis pubis without oral or IV contrast   FINDINGS: LOWER CHEST: Emphysema. No large airways wall thickening. Pattern of distal airways tiny nodules suggesting some element of distal small airways infectious or inflammatory disease such as bronchiolitis   BONES: No acute skeletal findings.   LIVER: Normal. No enlargement or evidence of cirrhosis or fatty change. No gross evidence of a mass, noting low sensitivity and specificity without IV contrast.   SPLEEN: Normal. No enlargement.   PANCREAS: Normal. No CT evidence of acute or chronic pancreatitis. No obvious  duct dilation. No gross evidence of a mass, noting low sensitivity and specificity without IV contrast.   GALLBLADDER: Normal CT appearance. No dilation, calcified, or gas-containing stones.  Other types of gallstones could be occult on CT and detectable only by ultrasound.   BILE DUCTS: Normal. No biliary duct dilation.   ADRENAL GLANDS: Normal. No nodule or mass.   KIDNEYS AND URETERS: Normal.  No hydronephrosis on either side.  No radiodense stone.  Radiolucent stones could be occult on CT.  No gross evidence of a solid mass, noting low sensitivity and specificity without IV contrast.   LYMPH NODES: No adenopathy, intraperitoneal, retroperitoneal, pelvic, inguinal or otherwise.   APPENDIX: Normal.  Not dilated, thick walled or in any other way inflamed in appearance.  No inflammatory change about the appendix.   COLON: Normal. No sign of acute diverticulitis or other colitis. No annular constricting mass.   SMALL BOWEL: Normal.  No small bowel dilation or any other sign of small bowel obstruction.   STOMACH / DUODENUM: Grossly normal by CT which has limited sensitivity and specificity for the stomach and duodenum.   RETROPERITONEUM: Normal.  No acute hemorrhage or inflammatory change. Lymph nodes in a separate dedicated section.   OMENTUM, MESENTERY AND PERITONEAL SPACES: Free intraperitoneal air: Negative Free intraperitoneal fluid: Negative Abscess: Negative Other: n/a   URINARY BLADDER: Normal. No wall thickening, large diverticula, radiodense stone or surrounding inflammatory change.   PELVIS: Mild enlargement of the prostate. No pelvic mass, adenopathy or free fluid.   VASCULATURE: Infrarenal abdominal aortic aneurysm measures 39 mm transverse (coronal 40), 39 mm anteroposterior (sagittal 59)   ABDOMINAL WALL: Hernia: Negative Other: As I have annotated with a [place] on sagittal image 83 and axial series 201, image 71, there is an ellipsoid, 4.5 cm craniocaudal, 1.5 cm anteroposterior, 4.5 cm transverse probable hematoma in the subcutaneous fat superficial to and outside the left rectus. No large skin defect, subcutaneous gas or radiopaque foreign body. There is overlying skin thickening. Was this and injection site?       Mid infrarenal abdominal aortic aneurysm measures 39 x 39 mm, involves the infrarenal aorta from 7 cm proximal to its bifurcation all the way to the bifurcation. There was no abdominal aortic aneurysm on CT abdomen and pelvis 25 July 2012, the most recent pertinent comparison exam. There is definitely mural thrombus within the abdominal aortic aneurysm on today's exam, some of which may be recent, although the attenuation does not suggest hyperacute blood. There are no acute blood products or inflammatory changes around the abdominal aortic aneurysm. Follow-up three-phase CTA without and with contrast in arterial and venous phases recommended for further evaluation of the abdominal aortic aneurysm. It is for this  finding I have activated the Yellow Alert associated with this exam   4.5 x 1.5 x 4.5 cm probable hematoma in the subcutaneous fat superficial to and separate from the left rectus bundle, left paramidline ventral abdominal wall, which I have annotated with a [place] on axial image 71 and sagittal image 83. Was this a subcutaneous injection site?   No other acute/subacute hemorrhage/hematoma anywhere else in the abdominal wall and none at all in the abdomen or pelvis proper   No acute appendicitis, diverticulitis, other colitis, bowel obstruction, perforation, abscess or free fluid   No acute pancreatitis, hydronephrosis/urinary stone or any other acute solid organ findings   No gallbladder or biliary duct dilation   MACRO: Critical Finding:  See findings. Notification was initiated on 3/4/2024 at 8:55 am by  Favian Christopher.  (**-YCF-**) Instructions:   Signed by: Favian Christopher 3/4/2024 8:56 AM Dictation workstation:   [id number]    ECG 12 Lead    Result Date: 3/4/2024  Normal sinus rhythm Normal ECG When compared with ECG of 03-MAR-2024 06:43, Premature ventricular complexes are no longer Present Premature atrial complexes are no longer Present Confirmed by Greg Moulton ([id number]) on 3/4/2024 7:53:15 AM    ECG 12 Lead    Result Date: 3/3/2024  Sinus rhythm with occasional Premature ventricular complexes and Premature atrial complexes Nonspecific ST abnormality Abnormal ECG When compared with ECG of 15-KELLY-2024 06:41, Sinus rhythm has replaced Electronic atrial pacemaker ST now depressed in Inferior leads See ED provider note for full interpretation and clinical correlation Confirmed by Maribel Bernal ([id number]) on 3/3/2024 1:02:58 PM    XR chest 1 view    Result Date: 3/3/2024  STUDY: Chest Radiograph;  03/03/2024 07:11AM INDICATION: Shortness of breath. COMPARISON: 6/11/2021 XR Chest. ACCESSION NUMBER(S): [id number] ORDERING CLINICIAN: MIRYAM BERRIOS TECHNIQUE:  Frontal chest was obtained at 07:10 hours. FINDINGS:  CARDIOMEDIASTINAL SILHOUETTE: Cardiomediastinal silhouette is normal in size and configuration. Left subclavian approach AICD is seen with leads overlying the right atrium and right ventricle.  LUNGS: Lungs are clear.  Lungs are slightly hyperexpanded suggesting chronic changes of COPD with flattening of hemidiaphragms.  ABDOMEN: No remarkable upper abdominal findings.  BONES: No acute osseous changes.  Thoracic spinal stimulator is noted with leads overlying the mid to upper mediastinum.    No acute cardiopulmonary disease.  Chronic changes suggesting COPD. Signed by Drake Scales        Assessment/Plan     Infrarenal AAA  Incidental finding on CT a/p obtained for evaluation of abdominal pain. Infrarenal AAA measuring 3.9 x 3.9 cm with mural thrombus. Started on Heparin drip per primary. CTA c/a/p confirmed 3.8cm infrarenal AAA with circumferential mural thrombus w/o abnormal enhancement; no extravasation.   -Discussed with Dr. Benoit and imaging reviewed  -No indication for surgical intervention at this time  -No need to anticoagulate mural thrombus  -Strict BP control; Keep SBP<140    PAD  S/p R AKA and multiple LLE interventions  CTA incidentally notable for severe stenosis v occlusion of L EIA and CFA. Unable to assess symptoms due to altered mental status.  -Discussed with Dr. Benoit and imaging reviewed  -resume Heparin drip for possible acute limb ischemia as labs allow (held due to elevated PTT)  -obtain PVR/SENAIT   -Continue DAPT and Statin  -smoking cessation   -further recommendations to follow    Acute Respiratory Acidosis; Acute Exacerbation of COPD 2/2 RSV; Nicotine Dependence  Noted to be lethargic upon return from CT. ABG c/w acute respiratory acidosis: ph 7.2, pCO2 91, pO2 151, HCO3 35.6  -Started on BiPap  -transfer to ICU  -management per primary     HTN; HLD; ICM  S/p AICD 2020  Echo (8/2023) revealed moderately to severely decreased LV function with EF 30-35%, global hypokinesis of LV, diastolic  dysfunction, 2-3+ TV regurgitation with RVSP 71mmHg   on admit   -management per primary     I spent 60 minutes in the professional and overall care of this patient.

## 2024-03-04 NOTE — PROGRESS NOTES
Luis Peacock Jr. is a 73 y.o. male on day 1 of admission presenting with COPD (chronic obstructive pulmonary disease) (CMS/HCC).      Subjective   Pt is ill, complaining of stomach pain and no appetite, CT AP was positive for new AAA and thrombosis, CTA AP was ordered per radiologist monique, consulted vascular surgery, started on Heparin infusion   Pt is on pain management   The pacemaker overnight had firing rate, EP was consulted      Objective     Last Recorded Vitals  /79   Pulse 75   Temp 36.8 °C (98.2 °F)   Resp 17   Wt (!) 43.1 kg (95 lb)   SpO2 97%   Intake/Output last 3 Shifts:  No intake or output data in the 24 hours ending 03/04/24 1155    Admission Weight  Weight: (!) 43.1 kg (95 lb) (03/03/24 0641)    Daily Weight  03/03/24 : (!) 43.1 kg (95 lb)    Image Results  ECG 12 Lead  Normal sinus rhythm  Normal ECG  When compared with ECG of 04-MAR-2024 08:54, (unconfirmed)  Premature supraventricular complexes are no longer Present  Confirmed by Liset Sparks ([id number]) on 3/4/2024 9:11:25 AM  CT abdomen pelvis wo IV contrast  Narrative: Interpreted By:  Favian Christopher,   STUDY:  CT ABDOMEN PELVIS WO IV CONTRAST;  3/3/2024 1:01 pm      INDICATION:  Signs/Symptoms:Abdominal pain.      COMPARISON:  None.      ACCESSION NUMBER(S):  [id number]      ORDERING CLINICIAN:  JOSE DUARTE      TECHNIQUE:  CT of the abdomen and pelvis from the lung bases through the  symphysis pubis without oral or IV contrast      FINDINGS:  LOWER CHEST: Emphysema. No large airways wall thickening. Pattern of  distal airways tiny nodules suggesting some element of distal small  airways infectious or inflammatory disease such as bronchiolitis      BONES: No acute skeletal findings.      LIVER: Normal. No enlargement or evidence of cirrhosis or fatty  change. No gross evidence of a mass, noting low sensitivity and  specificity without IV contrast.      SPLEEN: Normal. No enlargement.      PANCREAS: Normal. No CT evidence of  acute or chronic pancreatitis. No  obvious  duct dilation. No gross evidence of a mass, noting low  sensitivity and specificity without IV contrast.      GALLBLADDER: Normal CT appearance. No dilation, calcified, or  gas-containing stones.  Other types of gallstones could be occult on  CT and detectable only by ultrasound.      BILE DUCTS: Normal. No biliary duct dilation.      ADRENAL GLANDS: Normal. No nodule or mass.      KIDNEYS AND URETERS: Normal.  No hydronephrosis on either side.  No  radiodense stone.  Radiolucent stones could be occult on CT.  No  gross evidence of a solid mass, noting low sensitivity and  specificity without IV contrast.      LYMPH NODES: No adenopathy, intraperitoneal, retroperitoneal, pelvic,  inguinal or otherwise.      APPENDIX: Normal.  Not dilated, thick walled or in any other way  inflamed in appearance.  No inflammatory change about the appendix.      COLON: Normal. No sign of acute diverticulitis or other colitis. No  annular constricting mass.      SMALL BOWEL: Normal. No small bowel dilation or any other sign of  small bowel obstruction.      STOMACH / DUODENUM: Grossly normal by CT which has limited  sensitivity and specificity for the stomach and duodenum.      RETROPERITONEUM: Normal.  No acute hemorrhage or inflammatory change.  Lymph nodes in a separate dedicated section.      OMENTUM, MESENTERY AND PERITONEAL SPACES:  Free intraperitoneal air: Negative  Free intraperitoneal fluid: Negative  Abscess: Negative  Other: n/a      URINARY BLADDER: Normal. No wall thickening, large diverticula,  radiodense stone or surrounding inflammatory change.      PELVIS: Mild enlargement of the prostate. No pelvic mass, adenopathy  or free fluid.      VASCULATURE: Infrarenal abdominal aortic aneurysm measures 39 mm  transverse (coronal 40), 39 mm anteroposterior (sagittal 59)      ABDOMINAL WALL:  Hernia: Negative  Other: As I have annotated with a [place] on sagittal image 83 and  axial  series 201, image 71, there is an ellipsoid, 4.5 cm  craniocaudal, 1.5 cm anteroposterior, 4.5 cm transverse probable  hematoma in the subcutaneous fat superficial to and outside the left  rectus. No large skin defect, subcutaneous gas or radiopaque foreign  body. There is overlying skin thickening. Was this and injection site?      Impression: Mid infrarenal abdominal aortic aneurysm measures 39 x 39 mm,  involves the infrarenal aorta from 7 cm proximal to its bifurcation  all the way to the bifurcation. There was no abdominal aortic  aneurysm on CT abdomen and pelvis 25 July 2012, the most recent  pertinent comparison exam. There is definitely mural thrombus within  the abdominal aortic aneurysm on today's exam, some of which may be  recent, although the attenuation does not suggest hyperacute blood.  There are no acute blood products or inflammatory changes around the  abdominal aortic aneurysm. Follow-up three-phase CTA without and with  contrast in arterial and venous phases recommended for further  evaluation of the abdominal aortic aneurysm. It is for this finding I  have activated the Yellow Alert associated with this exam      4.5 x 1.5 x 4.5 cm probable hematoma in the subcutaneous fat  superficial to and separate from the left rectus bundle, left  paramidline ventral abdominal wall, which I have annotated with a  [place] on axial image 71 and sagittal image 83. Was this a  subcutaneous injection site?      No other acute/subacute hemorrhage/hematoma anywhere else in the  abdominal wall and none at all in the abdomen or pelvis proper      No acute appendicitis, diverticulitis, other colitis, bowel  obstruction, perforation, abscess or free fluid      No acute pancreatitis, hydronephrosis/urinary stone or any other  acute solid organ findings      No gallbladder or biliary duct dilation      MACRO:  Critical Finding:  See findings. Notification was initiated on  3/4/2024 at 8:55 am by  Favian Christopher.   (**-YCF-**) Instructions:      Signed by: Favian Christopher 3/4/2024 8:56 AM  Dictation workstation:   [id number]  ECG 12 Lead  Normal sinus rhythm  Normal ECG  When compared with ECG of 03-MAR-2024 06:43,  Premature ventricular complexes are no longer Present  Premature atrial complexes are no longer Present  Confirmed by Greg Moulton ([id number]) on 3/4/2024 7:53:15 AM      Physical Exam  General: Well-developed thin male, in distress due to pain, on facemask  HEENT: AT, NC, no JVD, no lymphadenopathy, neck supple  Lungs: Generalized coarse breath sounds  Cardiac: Normal S1-S2, no murmur, no gallop  Abdomen: Soft, epigastric and left periumbilical tenderness, no distention, positive bowel sound  Extremities: Right AKA, no edema, pulses intact, ROM intact  Neurological: Alert awake oriented x3, sensation intact, clear speech, agitated       Assessment/Plan   Principal Problem:    COPD (chronic obstructive pulmonary disease) (CMS/[facility])  Active Problems:    RSV (acute bronchiolitis due to respiratory syncytial virus)    Luis Peacock Jr. is a 73 y.o. male, from home with a past medical history of CAD status post PCI, ischemic cardiomyopathy status post ICD, systolic CHF, PVD status post KRYSTLE, HTN, HLD, COPD, who was admitted for management of acute hypoxic respiratory failure in the setting of COPD exacerbation due to RSV upper respiratory tract infection     Acute hypoxic respiratory failure due to RSV upper respiratory infection  COPD exacerbation  Abdominal pain, CT AP positive for AAA with thrombosis and left-sided superficial abdominal rectus hematoma     Cw Droplet precaution, telemetry, oxygen, pain management, aspiration precaution    Solu-Medrol, DuoNeb, Tylenol, Morphine, Protonix, antiemetic   Patient is methadone dependent and follows up with pain management as outpatient for right leg status post AKA.  He does not want to be on methadone currently but he will aspirate if he feels so  Will provide CTA AP, started  on heparin infusion, pending vascular surgery consult   General surgery consult for abdominal wall hematoma  Pacemaker firing rate, pending EP final recs   Device interrogation revealed pacer sense, capture and impedances WNL. No ventricular arrhythmias noted. No AT/AF noted.    Continue with home meds for other comorbidities  VTE prophylaxis heparin infusion   Disposition: TBD     At 12:44 pm pt was more lethargic, placed stat CT head, ammonia, TSH, repeat blood culture and UA with urine culture, ABG, urine tox. I will also consult ICU to evaluate the pt.      ABG with PCO2 91, was placed on Bipap    Warner Rodas MD

## 2024-03-04 NOTE — CONSULTS
Inpatient consult to Acute Care Surgery  Consult performed by: MICAELA Yanez-CNP  Consult ordered by: Warner Rodas MD  Reason for consult: abdominal wall hematoma      General Surgery Consult Note  Patient: Luis Peacock Jr.  Unit/Bed: 925/925-B  YOB: 1950  MRN: [id number]  Acct: [id number]   Admitting Diagnosis: COPD (chronic obstructive pulmonary disease) (CMS/Formerly [facility]) [J44.9]  RSV (acute bronchiolitis due to respiratory syncytial virus) [J21.0]  COPD exacerbation (CMS/Formerly [facility]) [J44.1]  Acute hypoxic respiratory failure (CMS/Formerly [facility]) [J96.01]  Date:  3/3/2024  Hospital Day: 1  Attending: Warner Rodas MD    Complaint:  Chief Complaint   Patient presents with    Shortness of Breath     80% in RA, hx of CPOD, smoker, HF. Squad gave 2g Mg, 125 solumedrol and 3 duonebs      History of Present Illness:  Luis Peacock Jr. is a 73 y.o. male, from home with a past medical history of CAD status post PCI, ischemic cardiomyopathy status post ICD, systolic CHF, PVD status post Rt AKA, HTN, HLD, COPD, who presented with worsening shortness of breath after he woke up. He tried to use his home breathing treatment but no improvement. Called EMS and o their arrival he was saturating 80% on RA. They gave him an albuterol which did not improve him so they placed him on 3 DuoNeb treatments, given 125 of IV Solu-Medrol and 2 g of IV magnesium. Pt also is complaining of abdominal pain, and chest discomfort and is asking for relief of his distress. Pt denies LOC, bleeding, nausea, vomiting, diarrhea, constipation, urinary symptoms     Patient is from home and ambulates with crutches and wheelchair, lives with his family  Social: Active smoker, no alcohol or drug  PSH: Cardiac stent, PCI, leg surgery, AICD, AKA, cardiac cath  FH: Noncontributory     ED course: /108, HR 85, RR 22, afebrile, O2 sat 90% on supplemental oxygen  Labs: Hyponatremia 132, positive for RSV, CXR positive for chronic COPD  EKG: Sinus  rhythm with occasional PVC, nonspecific ST abnormalities  Was continued on oxygen in the ER however had respiratory distress and desatting even with exertion.  He was given Zofran and Dilaudid in the ER.     Patient was admitted for management of acute hypoxic respiratory failure in the setting of COPD exacerbation due to RSV upper respiratory tract infection.    Allergies:  Allergies   Allergen Reactions    Morphine Itching    Simvastatin Itching      PMHx:  Past Medical History:   Diagnosis Date    Anesthesia of skin     Numbness    Angina, class IV (CMS/Formerly [facility])     CHF (congestive heart failure) (CMS/Formerly [facility])     Chronic obstructive pulmonary disease with (acute) exacerbation (CMS/Formerly [facility]) 12/08/2022    Acute exacerbation of chronic obstructive pulmonary disease (COPD)    Coronary artery disease     Cough, unspecified     Cough    Disorder of prostate, unspecified     Prostate disease    Encounter for general adult medical examination without abnormal findings 12/09/2021    Encounter for Medicare annual wellness exam    Encounter for immunization 10/16/2020    Encounter for immunization    Functional dyspepsia     Indigestion    Hypertension     Ischemic cardiomyopathy     Local infection of the skin and subcutaneous tissue, unspecified 09/08/2020    Staph skin infection    Mixed hyperlipidemia 12/09/2021    Mixed hyperlipidemia    Muscle weakness (generalized)     Localized muscle weakness    Nicotine dependence, cigarettes, uncomplicated 10/16/2020    Heavy cigarette smoker    Other iron deficiency anemias 10/11/2021    Hypochromic erythrocytes    Other spondylosis, cervical region 03/16/2022    Other spondylosis, cervical region    Pain in leg, unspecified 03/16/2022    Leg pain    Personal history of other diseases of the circulatory system     History of cardiac disorder    Personal history of other diseases of the circulatory system     History of hypertension    Personal history of other diseases of the circulatory  system 12/09/2021    History of class IV angina pectoris    Personal history of other diseases of the circulatory system 03/16/2022    History of vascular disorder    Personal history of other diseases of the musculoskeletal system and connective tissue     History of arthritis    Personal history of other diseases of the musculoskeletal system and connective tissue 03/16/2022    History of low back pain    Personal history of other diseases of the musculoskeletal system and connective tissue 03/16/2022    History of neck pain    Personal history of other specified conditions     History of nausea    Personal history of other specified conditions     History of chest pain    Personal history of other specified conditions     History of heartburn    Postnasal drip     Post-nasal drip    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 AKA on right side    CORONARY ANGIOPLASTY WITH STENT PLACEMENT  04/27/2017    Cath Placement Of Stent 1    OTHER SURGICAL HISTORY  11/09/2021    Tooth extraction    OTHER SURGICAL HISTORY  11/09/2021    Leg surgery    OTHER SURGICAL HISTORY  11/09/2021    Cardioverter defibrillator insertion    OTHER SURGICAL HISTORY  11/09/2021    Amputation Of Leg Above Knee    OTHER SURGICAL HISTORY  04/03/2019    Cardiac catheterization with stent placement     Social Hx:  Social History     Socioeconomic History    Marital status:      Spouse name: Not on file    Number of children: Not on file    Years of education: Not on file    Highest education level: Not on file   Occupational History    Not on file   Tobacco Use    Smoking status: Every  Day     Packs/day: .2     Types: Cigarettes    Smokeless tobacco: Never   Substance and Sexual Activity    Alcohol use: Not Currently    Drug use: Never    Sexual activity: Defer   Other Topics Concern    Not on file   Social History Narrative    Not on file     Social Determinants of Health     Financial Resource Strain: Low Risk  (3/3/2024)    Overall Financial Resource Strain (CARDIA)     Difficulty of Paying Living Expenses: Not hard at all   Food Insecurity: Not on file   Transportation Needs: No Transportation Needs (3/3/2024)    PRAPARE - Transportation     Lack of Transportation (Medical): No     Lack of Transportation (Non-Medical): No   Physical Activity: Not on file   Stress: Not on file   Social Connections: Not on file   Intimate Partner Violence: Not on file   Housing Stability: Low Risk  (3/3/2024)    Housing Stability Vital Sign     Unable to Pay for Housing in the Last Year: No     Number of Places Lived in the Last Year: 1     Unstable Housing in the Last Year: No     Family Hx:  Family History   Problem Relation Name Age of Onset    Alzheimer's disease Mother  74    Other (old age) Father  81     Review of Systems:   Review of Systems   Reason unable to perform ROS: Patient Somnolent, arousable to voice but quickly falls back asleep.     Physical Examination:    Visit Vitals  /71   Pulse 82   Temp 36.8 °C (98.2 °F)   Resp 17     Physical Exam  Vitals and nursing note reviewed.   Constitutional:       Appearance: He is ill-appearing.      Comments: Somnolent, arousable to voice and pain but quickly falls back asleep   HENT:      Head: Normocephalic and atraumatic.  Abdominal:      General: Abdomen is flat. There is no distension.      Tenderness: There is no abdominal tenderness as patient did not respond to palpation.   Skin:     General: Skin is warm and dry.  Neurological:      Mental Status: He is disoriented.      LABS:  CBC:   Lab Results   Component Value Date    WBC 8.9 03/04/2024     "RBC 3.74 (L) 03/04/2024    HGB 13.7 03/04/2024    HCT 40.8 (L) 03/04/2024     (H) 03/04/2024    MCH 36.6 (H) 03/04/2024    MCHC 33.6 03/04/2024    RDW 12.7 03/04/2024     (L) 03/04/2024     CBC with Differential:    Lab Results   Component Value Date    WBC 8.9 03/04/2024    RBC 3.74 (L) 03/04/2024    HGB 13.7 03/04/2024    HCT 40.8 (L) 03/04/2024     (L) 03/04/2024     (H) 03/04/2024    MCH 36.6 (H) 03/04/2024    MCHC 33.6 03/04/2024    RDW 12.7 03/04/2024    NRBC 0.0 03/04/2024    LYMPHOPCT 12.3 03/03/2024    MONOPCT 8.1 03/03/2024    EOSPCT 0.1 03/03/2024    BASOPCT 0.4 03/03/2024    MONOSABS 0.55 03/03/2024    LYMPHSABS 0.83 03/03/2024    EOSABS 0.01 03/03/2024    BASOSABS 0.03 03/03/2024     CMP:    Lab Results   Component Value Date     (L) 03/04/2024    K 5.2 03/04/2024    CL 86 (L) 03/04/2024    CO2 34 (H) 03/04/2024    BUN 16 03/04/2024    CREATININE 0.61 03/04/2024    GLUCOSE 106 (H) 03/04/2024    PROT 7.3 03/03/2024    CALCIUM 9.7 03/04/2024    BILITOT 0.5 03/03/2024    ALKPHOS 59 03/03/2024    AST 25 03/03/2024    ALT 17 03/03/2024     BMP:    Lab Results   Component Value Date     (L) 03/04/2024    K 5.2 03/04/2024    CL 86 (L) 03/04/2024    CO2 34 (H) 03/04/2024    BUN 16 03/04/2024    CREATININE 0.61 03/04/2024    CALCIUM 9.7 03/04/2024    GLUCOSE 106 (H) 03/04/2024     Magnesium:No results found for: \"MG\"  Troponin:    Lab Results   Component Value Date    TROPHS 11 03/03/2024    TROPHS 11 03/03/2024     Lipid Panel:  No results found for: \"HDL\", \"CHHDL\", \"VLDL\", \"TRIG\", \"NHDL\"   Current Medications:    Current Facility-Administered Medications:     acetaminophen (Tylenol) oral liquid 650 mg, 650 mg, oral, q4h PRN **OR** acetaminophen (Tylenol) tablet 650 mg, 650 mg, oral, q4h PRN, Warner Rodas MD, 650 mg at 03/04/24 0124    albuterol 90 mcg/actuation inhaler 2 puff, 2 puff, inhalation, q4h PRN, Warner Rodas MD, 2 puff at 03/04/24 0200    " amLODIPine (Norvasc) tablet 5 mg, 5 mg, oral, Daily, Warner Rodas MD, 5 mg at 03/04/24 1019    aspirin chewable tablet 81 mg, 81 mg, oral, Daily, Warner Rodas MD, 81 mg at 03/04/24 1019    atorvastatin (Lipitor) tablet 40 mg, 40 mg, oral, Nightly, Warner Rodas MD, 40 mg at 03/03/24 2029    benzonatate (Tessalon) capsule 100 mg, 100 mg, oral, TID PRN, Warner Rodas MD, 100 mg at 03/03/24 2029    clopidogrel (Plavix) tablet 75 mg, 75 mg, oral, Daily, Warner Rodas MD, 75 mg at 03/04/24 1019    docusate sodium (Colace) capsule 100 mg, 100 mg, oral, BID, Warner Rodas MD, 100 mg at 03/03/24 2029    furosemide (Lasix) tablet 40 mg, 40 mg, oral, Daily, Warner Rodas MD, 40 mg at 03/04/24 1019    gabapentin (Neurontin) capsule 600 mg, 600 mg, oral, BID, Warner Rodas MD, 600 mg at 03/04/24 1019    heparin (porcine) injection 2,000-4,000 Units, 2,000-4,000 Units, intravenous, q4h PRN, Warner Rodas MD    heparin 25,000 Units in dextrose 5% 250 mL (100 Units/mL) infusion (premix), 0-4,500 Units/hr, intravenous, Continuous, Warner Rodas MD, Last Rate: 7.8 mL/hr at 03/04/24 1041, 775 Units/hr at 03/04/24 1041    hydrALAZINE (Apresoline) injection 10 mg, 10 mg, intravenous, q6h PRN, Warner Rodas MD, 10 mg at 03/03/24 1714    ipratropium-albuteroL (Duo-Neb) 0.5-2.5 mg/3 mL nebulizer solution 3 mL, 3 mL, nebulization, q6h, Warner Rodas MD, 3 mL at 03/04/24 0811    lisinopril tablet 5 mg, 5 mg, oral, Daily, Warner Rodas MD, 5 mg at 03/04/24 1019    [Held by provider] methadone (Dolophine) tablet 5 mg, 5 mg, oral, q6h, Warner Rodas MD    methylPREDNISolone sod succinate (PF) (SOLU-Medrol) 40 mg/mL injection 40 mg, 40 mg, intravenous, q12h, Warner Rodas MD, 40 mg at 03/04/24 1018    metoprolol succinate XL (Toprol-XL) 24 hr tablet 50 mg, 50 mg, oral, q PM, Warner Rodas MD, 50 mg at 03/03/24 2029     morphine injection 1 mg, 1 mg, intravenous, q4h PRN, Warner Duarte MD, 1 mg at 03/04/24 1001    naloxone (Narcan) injection 1 mg, 1 mg, intravenous, PRN, Warner Duarte MD    nitroglycerin (Nitrostat) SL tablet 0.4 mg, 0.4 mg, sublingual, q5 min PRN, Warner Duarte MD    oxygen (O2) therapy, , inhalation, Continuous PRN - O2/gases, Mary Babb, APRN-CNP, Start at 03/03/24 0648    oxygen (O2) therapy, , inhalation, Continuous PRN - O2/gases, Warner Duarte MD    pantoprazole (ProtoNix) injection 40 mg, 40 mg, intravenous, Daily, Warner Duarte MD    polyethylene glycol (Glycolax, Miralax) packet 17 g, 17 g, oral, Daily, Warner Duarte MD, 17 g at 03/03/24 1501  ECG 12 Lead    Result Date: 3/4/2024  Normal sinus rhythm Normal ECG When compared with ECG of 04-MAR-2024 08:54, (unconfirmed) Premature supraventricular complexes are no longer Present Confirmed by Liset Sparks ([id number]) on 3/4/2024 9:11:25 AM    CT abdomen pelvis wo IV contrast    Result Date: 3/4/2024  Interpreted By:  Favian Christopher, STUDY: CT ABDOMEN PELVIS WO IV CONTRAST;  3/3/2024 1:01 pm   INDICATION: Signs/Symptoms:Abdominal pain.   COMPARISON: None.   ACCESSION NUMBER(S): [id number]   ORDERING CLINICIAN: WARNER DUARTE   TECHNIQUE: CT of the abdomen and pelvis from the lung bases through the symphysis pubis without oral or IV contrast   FINDINGS: LOWER CHEST: Emphysema. No large airways wall thickening. Pattern of distal airways tiny nodules suggesting some element of distal small airways infectious or inflammatory disease such as bronchiolitis   BONES: No acute skeletal findings.   LIVER: Normal. No enlargement or evidence of cirrhosis or fatty change. No gross evidence of a mass, noting low sensitivity and specificity without IV contrast.   SPLEEN: Normal. No enlargement.   PANCREAS: Normal. No CT evidence of acute or chronic pancreatitis. No obvious  duct dilation. No gross evidence of a mass,  noting low sensitivity and specificity without IV contrast.   GALLBLADDER: Normal CT appearance. No dilation, calcified, or gas-containing stones.  Other types of gallstones could be occult on CT and detectable only by ultrasound.   BILE DUCTS: Normal. No biliary duct dilation.   ADRENAL GLANDS: Normal. No nodule or mass.   KIDNEYS AND URETERS: Normal.  No hydronephrosis on either side.  No radiodense stone.  Radiolucent stones could be occult on CT.  No gross evidence of a solid mass, noting low sensitivity and specificity without IV contrast.   LYMPH NODES: No adenopathy, intraperitoneal, retroperitoneal, pelvic, inguinal or otherwise.   APPENDIX: Normal.  Not dilated, thick walled or in any other way inflamed in appearance.  No inflammatory change about the appendix.   COLON: Normal. No sign of acute diverticulitis or other colitis. No annular constricting mass.   SMALL BOWEL: Normal. No small bowel dilation or any other sign of small bowel obstruction.   STOMACH / DUODENUM: Grossly normal by CT which has limited sensitivity and specificity for the stomach and duodenum.   RETROPERITONEUM: Normal.  No acute hemorrhage or inflammatory change. Lymph nodes in a separate dedicated section.   OMENTUM, MESENTERY AND PERITONEAL SPACES: Free intraperitoneal air: Negative Free intraperitoneal fluid: Negative Abscess: Negative Other: n/a   URINARY BLADDER: Normal. No wall thickening, large diverticula, radiodense stone or surrounding inflammatory change.   PELVIS: Mild enlargement of the prostate. No pelvic mass, adenopathy or free fluid.   VASCULATURE: Infrarenal abdominal aortic aneurysm measures 39 mm transverse (coronal 40), 39 mm anteroposterior (sagittal 59)   ABDOMINAL WALL: Hernia: Negative Other: As I have annotated with a Robinson on sagittal image 83 and axial series 201, image 71, there is an ellipsoid, 4.5 cm craniocaudal, 1.5 cm anteroposterior, 4.5 cm transverse probable hematoma in the subcutaneous fat  superficial to and outside the left rectus. No large skin defect, subcutaneous gas or radiopaque foreign body. There is overlying skin thickening. Was this and injection site?       Mid infrarenal abdominal aortic aneurysm measures 39 x 39 mm, involves the infrarenal aorta from 7 cm proximal to its bifurcation all the way to the bifurcation. There was no abdominal aortic aneurysm on CT abdomen and pelvis 25 July 2012, the most recent pertinent comparison exam. There is definitely mural thrombus within the abdominal aortic aneurysm on today's exam, some of which may be recent, although the attenuation does not suggest hyperacute blood. There are no acute blood products or inflammatory changes around the abdominal aortic aneurysm. Follow-up three-phase CTA without and with contrast in arterial and venous phases recommended for further evaluation of the abdominal aortic aneurysm. It is for this finding I have activated the Yellow Alert associated with this exam   4.5 x 1.5 x 4.5 cm probable hematoma in the subcutaneous fat superficial to and separate from the left rectus bundle, left paramidline ventral abdominal wall, which I have annotated with a [place] on axial image 71 and sagittal image 83. Was this a subcutaneous injection site?   No other acute/subacute hemorrhage/hematoma anywhere else in the abdominal wall and none at all in the abdomen or pelvis proper   No acute appendicitis, diverticulitis, other colitis, bowel obstruction, perforation, abscess or free fluid   No acute pancreatitis, hydronephrosis/urinary stone or any other acute solid organ findings   No gallbladder or biliary duct dilation   MACRO: Critical Finding:  See findings. Notification was initiated on 3/4/2024 at 8:55 am by  Favian Christopher.  (**-YCF-**) Instructions:   Signed by: Favian Christopher 3/4/2024 8:56 AM Dictation workstation:   [id number]    ECG 12 Lead    Result Date: 3/4/2024  Normal sinus rhythm Normal ECG When compared with ECG of  03-MAR-2024 06:43, Premature ventricular complexes are no longer Present Premature atrial complexes are no longer Present Confirmed by Greg Moulton ([id number]) on 3/4/2024 7:53:15 AM    ECG 12 Lead    Result Date: 3/3/2024  Sinus rhythm with occasional Premature ventricular complexes and Premature atrial complexes Nonspecific ST abnormality Abnormal ECG When compared with ECG of 15-KELLY-2024 06:41, Sinus rhythm has replaced Electronic atrial pacemaker ST now depressed in Inferior leads See ED provider note for full interpretation and clinical correlation Confirmed by Maribel Bernal ([id number]) on 3/3/2024 1:02:58 PM    XR chest 1 view    Result Date: 3/3/2024  STUDY: Chest Radiograph;  03/03/2024 07:11AM INDICATION: Shortness of breath. COMPARISON: 6/11/2021 XR Chest. ACCESSION NUMBER(S): [id number] ORDERING CLINICIAN: MIRYAM BERRIOS TECHNIQUE:  Frontal chest was obtained at 07:10 hours. FINDINGS: CARDIOMEDIASTINAL SILHOUETTE: Cardiomediastinal silhouette is normal in size and configuration. Left subclavian approach AICD is seen with leads overlying the right atrium and right ventricle.  LUNGS: Lungs are clear.  Lungs are slightly hyperexpanded suggesting chronic changes of COPD with flattening of hemidiaphragms.  ABDOMEN: No remarkable upper abdominal findings.  BONES: No acute osseous changes.  Thoracic spinal stimulator is noted with leads overlying the mid to upper mediastinum.    No acute cardiopulmonary disease.  Chronic changes suggesting COPD. Signed by Drake Scales       Assessment:    Consult for abdominal hematoma    On exam patient is ill appearing on bipap. He is somnolent opens eyes to voice and the falls back asleep. Abdomen is soft, non distended and no pain elicited with palpation. CT ABD and pelvis showed a 4.5 x 1.5 x 4.5 cm hematoma in the subcutaneous fat. No concern for continuous bleeding. Hgb stable at 13.7.      Plan:  -Per ICU team  -General surgery to sign off as there is no surgical  intervention needed for superficial hematoma.       Further recommendations per Dr. Bernabe     Time spent  60  minutes obtaining labs, imaging, recommendations, interview, assessment, examination, medication review/ordering, and EMR review.    Plan of care was discussed extensively with patient. Patient verbalized understanding through teach back method. All questions and concerns addressed upon examination.     Of note, this documentation is completed using the Dragon Dictation system (voice recognition software). There may be spelling and/or grammatical errors that were not corrected prior to final submission.    Electronically signed by JOSE Yanez on 3/4/2024 at 1:07 PM

## 2024-03-05 NOTE — PROCEDURES
ENDOTRACHEAL INTUBATION     Indication: Acute Hypercapnic Respiratory Failure; Hemorrhagic Shock    Provider: JOSE Higgins    Assistants: Dr. Gaston TAO Medications:  100mg Ketamine  80mg Rocuronium    Hemodynamic Medications:  Norepinephrine infusion     Position: Sniffing    Pre-Oxygenation: 100% BiPAP    Using a S3 blade with the assistance of videolaryngoscopy, the vocal cords were able to be visualized.  A Grade 1 view was obtained.  A 7.5mm endotracheal tube was then advanced past the vocal cords without resistance. Sylet removed.  The tube was then connected to EtCO2 monitoring with positive color change.  Fogging of the tube was noted with bagging.  Bilateral breath sounds noted.  The tube was then secured at 23 cm at the teeth.  Patient then connected to the ventilator without incident.     Complications: None    Post intubated CXR:  Good position    JOSE Higgins  Pulmonary & Critical Care Medicine  [facility]

## 2024-03-05 NOTE — SIGNIFICANT EVENT
[facility]  TRAUMA ICU - SIGNIFICANT EVENT    Patient Name: Luis Peacock Jr.  MRN: [id number]  : 1950  AGE: 73 y.o.   GENDER: male  ==============================================================================    Called to bedside by nursing shortly after seeing the patient this morning on rounds.  Patient hypotensive with more large melanotic stool present.  He remains encephalopathic.  Blood pressure on cuff 60 systolic.  Levophed started, massive transfusion protocol initiated.  He had already received protamine and IV Protonix push.  GI consult is pending.    Emergent right IJ Cordis placed as well as right radial arterial line (please see separate procedure note).  Please note stability of these procedures was not able to be obtained due to the patient's critical condition.  Follow-up.  Patient was then resuscitated with 4 units of PRBCs and 4 units of FFP with improvement of his blood pressure.  He was then intubated using ketamine and rocuronium without incident.  The decision was made to resuscitate the patient prior to induction for intubation to prevent cardiac arrest.  He tolerated all these procedures well.    GI LIBORIO at bedside to assess the patient.  Plan to start Protonix infusion.  Discussed with attending and likely plans for EGD today.  Patient continues with variable Levophed requirements however hemoglobin appears to have stabilized with no further melanotic stool present.  Will plan to wean Levophed off as able.  Serial arterial blood gases obtained initially showed an acute respiratory acidosis which improved after intubation.  Patient did have an episode of air trapping with hypotension.  I was able to remove the patient from the ventilator, decompress his chest with direct external pressure with almost immediate improvement of his blood pressure.  Will need to watch for further air trapping given his history of lung disease.  He remains on steroids and  around-the-clock nebulizers.    Patient significant other updated in the waiting room.  They have lived together for some time as she has made medical decisions for him in the past however she has not his official POA and he does not have paperwork.  He does have siblings who live in [place] as well as mother nieces and nephews but they are apparently not very involved in his life.  Discussed with her the patient's critical condition.  Discussed plans for EGD today.  If EGD unrevealing may need a CT angiography to assess for other bleeding.    CCT: 90 min

## 2024-03-05 NOTE — PROGRESS NOTES
[facility] Critical Care Medicine       Date:  3/5/2024  Patient:  Luis Peacock Jr.  YOB: 1950  MRN:  [id number]   Admit Date:  3/3/2024  ========================================================================================================    Chief Complaint   Patient presents with    Shortness of Breath     80% in RA, hx of CPOD, smoker, HF. Squad gave 2g Mg, 125 solumedrol and 3 duonebs         History of Present Illness:  Luis Peacock Jr. is a 73 y.o. male, from home with a past medical history of CAD status post PCI, ischemic cardiomyopathy status post ICD, systolic CHF, PVD status post Rt AKA, HTN, HLD, COPD, who presented with worsening shortness of breath after he woke up. He tried to use his home breathing treatment but no improvement. Called EMS and o their arrival he was saturating 80% on RA. They gave him an albuterol which did not improve him so they placed him on 3 DuoNeb treatments, given 125 of IV Solu-Medrol and 2 g of IV magnesium. Pt also is complaining of abdominal pain, and chest discomfort and is asking for relief of his distress. Pt denies LOC, bleeding, nausea, vomiting, diarrhea, constipation, urinary symptoms. Patient is from home and ambulates with crutches and wheelchair, lives with his family     ED course: /108, HR 85, RR 22, afebrile, O2 sat 90% on supplemental oxygen  Labs: Hyponatremia 132, positive for RSV, CXR positive for chronic COPD  EKG: Sinus rhythm with occasional PVC, nonspecific ST abnormalities  Was continued on oxygen in the ER however had respiratory distress and desatting even with exertion.  He was given Zofran and Dilaudid in the ER.     Patient was admitted 3/3/24 for management of acute hypoxic respiratory failure in the setting of COPD exacerbation due to RSV upper respiratory tract infection.  He was initially maintained/treated on the floor for the first 24 hours of his admission.  A CT scan was obtained which showed an abdominal aortic  aneurysm.  He then underwent a CT angio of his abdomen and pelvis which confirmed the abdominal aneurysm with concern for intramural thrombus.  Vascular surgery was consulted and the patient was started on a heparin drip.  His PTT was noted to be supratherapeutic and this was held.  However upon return from CAT scan the patient was noted to be more lethargic.  Arterial blood gas was obtained which showed acute respiratory acidosis and the patient was initiated on BiPAP.  ICU was contacted to evaluate the patient for transfer given his worsening respiratory status and need for noninvasive ventilation.        Interval ICU Events:     3/4/2024: Patient transferred to the ICU for worsening hypercapnia and somnolence.    3/5: Mental status and agitation improved on BiPap and Precedex.  Remains with hypercapnea on ABG.  Early this AM noted to have melanotic stool and blood in mosley.  Heparin paused.  HGB down 3 grams on labs. 1U PRBCs ordered, protonix, K cocktail, and GI consulted.  Heparin reversed with protamine    Medical History:  Past Medical History:   Diagnosis Date    Anesthesia of skin     Numbness    Angina, class IV (CMS/[facility]ly [facility])     CHF (congestive heart failure) (CMS/[facility]ly [facility])     Chronic obstructive pulmonary disease with (acute) exacerbation (CMS/[facility]ly [facility]) 12/08/2022    Acute exacerbation of chronic obstructive pulmonary disease (COPD)    Coronary artery disease     Cough, unspecified     Cough    Disorder of prostate, unspecified     Prostate disease    Encounter for general adult medical examination without abnormal findings 12/09/2021    Encounter for Medicare annual wellness exam    Encounter for immunization 10/16/2020    Encounter for immunization    Functional dyspepsia     Indigestion    Hypertension     Ischemic cardiomyopathy     Local infection of the skin and subcutaneous tissue, unspecified 09/08/2020    Staph skin infection    Mixed hyperlipidemia 12/09/2021    Mixed hyperlipidemia    Muscle weakness  (generalized)     Localized muscle weakness    Nicotine dependence, cigarettes, uncomplicated 10/16/2020    Heavy cigarette smoker    Other iron deficiency anemias 10/11/2021    Hypochromic erythrocytes    Other spondylosis, cervical region 03/16/2022    Other spondylosis, cervical region    Pain in leg, unspecified 03/16/2022    Leg pain    Personal history of other diseases of the circulatory system     History of cardiac disorder    Personal history of other diseases of the circulatory system     History of hypertension    Personal history of other diseases of the circulatory system 12/09/2021    History of class IV angina pectoris    Personal history of other diseases of the circulatory system 03/16/2022    History of vascular disorder    Personal history of other diseases of the musculoskeletal system and connective tissue     History of arthritis    Personal history of other diseases of the musculoskeletal system and connective tissue 03/16/2022    History of low back pain    Personal history of other diseases of the musculoskeletal system and connective tissue 03/16/2022    History of neck pain    Personal history of other specified conditions     History of nausea    Personal history of other specified conditions     History of chest pain    Personal history of other specified conditions     History of heartburn    Postnasal drip     Post-nasal drip    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 AKA on right side    CORONARY ANGIOPLASTY WITH STENT PLACEMENT  04/27/2017    Cath Placement Of Stent 1    OTHER SURGICAL HISTORY  11/09/2021    Tooth extraction    OTHER  SURGICAL HISTORY  11/09/2021    Leg surgery    OTHER SURGICAL HISTORY  11/09/2021    Cardioverter defibrillator insertion    OTHER SURGICAL HISTORY  11/09/2021    Amputation Of Leg Above Knee    OTHER SURGICAL HISTORY  04/03/2019    Cardiac catheterization with stent placement     Medications Prior to Admission   Medication Sig Dispense Refill Last Dose    albuterol 2.5 mg /3 mL (0.083 %) nebulizer solution USE 1 UNIT DOSE EVERY 4-6 HOURS AS NEEDED FOR WHEEZING .   3/2/2024    albuterol 90 mcg/actuation inhaler Inhale 2 puffs by mouth up to every 4 hours as needed for shortness of breath, wheezing, or prior to exercise.   3/2/2024    amLODIPine (Norvasc) 5 mg tablet Take 1 tablet (5 mg) by mouth once daily.   3/2/2024    aspirin 81 mg chewable tablet Chew 1 tablet (81 mg) once daily.   3/2/2024    atorvastatin (Lipitor) 40 mg tablet Take 1 tablet (40 mg) by mouth once daily at bedtime.   3/2/2024    cholecalciferol (Vitamin D-3) 25 MCG (1000 UT) tablet TAKE AS DIRECTED.   3/2/2024    clopidogrel (Plavix) 75 mg tablet Take 1 tablet (75 mg) by mouth once daily.   3/2/2024    furosemide (Lasix) 20 mg tablet Take 1 tablet (20 mg) by mouth once daily.   3/2/2024    gabapentin (Neurontin) 300 mg capsule take 2-4 capsules BY MOUTH THREE TIMES DAILY AS DIRECTED 1080 capsule 3 3/2/2024    lisinopril 5 mg tablet Take 1 tablet (5 mg) by mouth once daily.   3/2/2024    methadone (Dolophine) 5 mg tablet Take 1 tablet (5 mg) by mouth every 6 hours.   3/2/2024    methadone (Dolophine) 5 mg tablet Take 1 tablet (5 mg) by mouth every 6 hours if needed for severe pain (7 - 10). Do not start before February 11, 2024. 120 tablet 0     metoprolol succinate XL (Toprol-XL) 50 mg 24 hr tablet TAKE 1 TABLET BY MOUTH DAILY IN THE EVENING 90 tablet 3 3/2/2024    naloxone (Narcan) 4 mg/0.1 mL nasal spray USE 1 SPRAY IN ONE NOSTRILONCE AS NEEDED FOR OVERDOSE SYMPTOMS. MAY REPEAT DOSE IF NEEDED IN 2-3 MINUTES IN YOUR OTHER NOSTRIL.   Unknown     nitroglycerin (Nitrostat) 0.4 mg SL tablet DISSOLVE 1 TABLET UNDER THE TONGUE AS NEEDED FOR CHEST PAIN- MAY REPEA   3/2/2024    pantoprazole (ProtoNix) 40 mg EC tablet TAKE ONE TABLET BY MOUTH DAILY 90 tablet 3 3/2/2024     Morphine and Simvastatin  Social History     Tobacco Use    Smoking status: Every Day     Packs/day: .2     Types: Cigarettes    Smokeless tobacco: Never   Substance Use Topics    Alcohol use: Not Currently    Drug use: Never     Family History   Problem Relation Name Age of Onset    Alzheimer's disease Mother  74    Other (old age) Father  81       Review of Systems:  14 point review of systems was completed and negative except for those specially mention in my HPI    Physical Exam:    Heart Rate:  [62-90]   Temp:  [36.4 °C (97.5 °F)-36.8 °C (98.2 °F)]   Resp:  [16-29]   BP: ()/(38-92)   Weight:  [42.2 kg (93 lb 0.6 oz)]   SpO2:  [85 %-100 %]     Physical Exam    Objective:    I have reviewed all medications, laboratory results, and imaging pertinent for today's encounter    Assessment/Plan:    Acute hypercapnic respiratory failure 2/2 COPD the exacerbation in the setting of RSV infection  Acute encephalopathy 2/2 CO2 narcosis  History of neuropathy  History of chronic pain on outpatient methadone  Acute COPD exacerbation  Acute hypercapnia  Coronary artery disease last heart cath in feb 2024 showing 10 to 30% left main, 50% proximal LAD, patent circumflex and RCA stents, 50% proximal RCA stenosis, and 75% PLV B diffuse stenosis with recommendation for medical management   Ischemic cardiopathy s/p ICD placement  Systolic congestive heart failure (EF 30%) not in acute exacerbation  Severe peripheral vascular disease s/p right AKA  History of hypertension/hyperlipidemia  Acute GI Bleed   Hyperkalemia   Abdominal AAA with intramural thrombus  Concern for left leg ischemia with findings of left iliac/femoral occlusion     Discussed with vascular surgery: Recommendation for heparin drip at this  time.  Neurovascular checks of the left lower extremity     EP recommendations reviewed: Device interrogation with no abnormalities     ACS consult reviewed: Signed off      Neuro/Psych/Pain Ctrl/Sedation:  Dilaudid Available for pain control  IV Tylenol while NPO  Hold Gabapentin while NPO and due to AMS  Precedex for agitation and BiPap tolerance, wean as able     Respiratory/ENT:  Continue BiPAP: marginal improvement in respiratory status, may require intubation   Dounebs Q4 and PRN  Solu-Medrol 40 mg twice daily through 3/8     Cardiovascular:  Maintain MAP greater than 65, not currently on any vasoactive agents. Responsive to blood products   Hold home aspirin/Plavix in the setting of GI bleed  Hold home Lasix   Hold home statin, Norvasc, lisinopril, metoprolol given GI bleed, low BPs  Does not appear decompensated from heart failure standpoint     GI:  N.p.o.  PPI BID  GI consult Re: GI Bleed     Renal/Volume Status (Intra & Extravascular):  HyperK Cocktail ordered, repeat K in 4 hours    Endocrine  Glycemic control adequate, sliding scale available     Infectious Disease:  Patient RSV positive, no evidence of superimposed bacterial infection at this time.  Monitor off antibiotics     Heme/Onc:  Hold Heparin ggt and DAPT - Protamine given for heparin reversal         OBGYN/MSK:  Repeat CK  Q2 Neurovasc checks of the LLE     Ethics/Code Status:  Full code     :  DVT Prophylaxis: SCDs, heparin on hold  GI Prophylaxis: PPI BID  Diet: N.p.o.  CVC: N/A  [place]: N/A  [place]: Needed for acute retention  Restraints: Needed for agitation   Dispo: ICU, critically ill     Critical Care Time: 35 minutes    Torres Feldman DO

## 2024-03-05 NOTE — PROCEDURES
PERCUTANEOUS SHEATH INTRODUCER PLACEMENT     Consent: Emergent     Indications: GI Bleed    Performed by: ME    Assistants: None    Procedure Details:    Catheter: 9 Fr, 10 cm radiopaque polyurethane    Sedation: None   Patient positioned supine +/- slight Trendelenburg.  Sterility: Not achieved due to emergent need, no IV access  Ultrasound-guided insertion: Yes    A time out was performed and the patient was identified.  Correct procedure and site verified. Using ultrasound guidance the RIGHT IJ Vein was cannulated.   A guidewire was then introduced into the needle. Guidewire position confirmed to be intravenous by visualization on ultrasound in short and long axis views. A small skin incision adjacent to guidewire with #11 scalpel.  The intodicer sheath was then advanced along with the dilator.  Dark non pulsatile blood was free flowing into the side port.  The catheter was then secured with sutures. Transparent film dressing with CHG applied at conclusion of this procedure (or at conclusion of subsequent procedure, if applicable). Sterility maintained throughout procedure. No complications. Patient tolerated well.    Chest x-ray  ORdered

## 2024-03-05 NOTE — SIGNIFICANT EVENT
Patient with emergent need for EGD due to massive GI bleed.   Not consentable and no durable healthcare proxy has been able to be identified.  Discussed with Dr. Kaye.  To proceed under two physician consent.

## 2024-03-05 NOTE — CARE PLAN
Problem: Pain - Adult  Goal: Verbalizes/displays adequate comfort level or baseline comfort level  3/5/2024 1519 by Yosef Mahoney RN  Outcome: Progressing  3/5/2024 1518 by Yosef Mahoney RN  Outcome: Progressing     Problem: Safety - Adult  Goal: Free from fall injury  3/5/2024 1519 by Yosef Mahoney RN  Outcome: Progressing  3/5/2024 1518 by Yosef Mahoney RN  Outcome: Progressing     Problem: Discharge Planning  Goal: Discharge to home or other facility with appropriate resources  3/5/2024 1519 by Yosef Mahoney RN  Outcome: Progressing  3/5/2024 1518 by Yosef Mahoney RN  Outcome: Progressing     Problem: Chronic Conditions and Co-morbidities  Goal: Patient's chronic conditions and co-morbidity symptoms are monitored and maintained or improved  3/5/2024 1519 by Yosef Mahoney RN  Outcome: Progressing  3/5/2024 1518 by Yosef Mahoney RN  Outcome: Progressing     Problem: Skin  Goal: Prevent/manage excess moisture  3/5/2024 1519 by Yosef Mahoney RN  Outcome: Progressing  3/5/2024 1518 by Yosef Mahoney RN  Outcome: Progressing  Goal: Promote/optimize nutrition  3/5/2024 1519 by Yosef Mahoney RN  Outcome: Progressing  3/5/2024 1518 by Yosef Mahoney RN  Outcome: Progressing     Problem: Indwelling Catheter Maintenance  Goal: I will have no complications from indwelling catheter  3/5/2024 1519 by Yosef Mahoney RN  Outcome: Progressing  3/5/2024 1518 by Yosef Mahoney RN  Outcome: Progressing     Problem: Physical Restraint  Goal: I will require minimum level of restraint  Outcome: Progressing   The patient's goals for the shift include      The clinical goals for the shift include Patient's ABG will show improvement with bipap compliance by the end of this shift.

## 2024-03-05 NOTE — CONSULTS
Department of Internal Medicine  Gastroenterology  Consult note      Reason for Consult: GIB    Chief Complaint: SOB    History Obtained from: Medical staff and prior medical records    History of Present Illness      The patient is a 73 y.o. male with signficant past medical history of CAD status post PCI-on ASA and Plavix, ischemic cardiomyopathy status post ICD, systolic CHF, PVD status post Rt AKA, HTN, HLD, COPD, who presented with worsening shortness of breath.     Patient was started on heparin drip this admission due to AAA with thrombus.  Patient unable to provide any history since intubated at the time of visit.    Per bedside RNYosef, patient has been on BiPAP since admission.  Patient transferred to the ICU early this morning for hypotension and multiple episodes of dark red blood per rectum, x4-5.  No vomiting noted.  Patient intubated this morning in the ICU.  Hemoglobin dropped in 1 day from 13.7->8.5 today. Massive blood transfusion protocol initiated with plan for 4 units PRBCs.    I spoke with Lucia who the patient currently lives with.  Lucia states the patient used to drink alcohol heavily for 20+ years, but quit on his own with no alcohol for the past 10-12 years now.  No known history of liver disease or cirrhosis.  No history of illicit drug use.  No evidence of cirrhosis on CT this admission.  She does not think the patient had an EGD or colonoscopy in the past.  Unable to find evidence of any scopes in the EMR.        Allergies  Morphine and Simvastatin    Current Medication    Current Facility-Administered Medications:     acetaminophen (Ofirmev) injection 1,000 mg, 1,000 mg, intravenous, q6h [facility], [facility]    acetaminophen (Tylenol) oral liquid 650 mg, 650 mg, oral, q4h PRN **OR** acetaminophen (Tylenol) tablet 650 mg, 650 mg, oral, q4h PRN, Warner Rodas MD, 650 mg at 03/04/24 0124    [Held by provider] amLODIPine (Norvasc) tablet 5 mg, 5 mg, oral, Daily, Warner  Joaquin Rodas MD, 5 mg at 03/04/24 1019    [Held by provider] aspirin chewable tablet 81 mg, 81 mg, oral, Daily, Warner Rodas MD, 81 mg at 03/04/24 1019    atorvastatin (Lipitor) tablet 40 mg, 40 mg, oral, Nightly, Warner Rodas MD, 40 mg at 03/03/24 2029    benzonatate (Tessalon) capsule 100 mg, 100 mg, oral, TID PRN, Warner Rodas MD, 100 mg at 03/03/24 2029    calcium chloride injection  - Omnicell Override Pull, , , ,     [Held by provider] clopidogrel (Plavix) tablet 75 mg, 75 mg, oral, Daily, Warner Rodas MD, 75 mg at 03/04/24 1019    [COMPLETED] insulin regular (HumuLIN R,NovoLIN R) injection 10 Units, 10 Units, intravenous, Once, 10 Units at 03/05/24 0745 **FOLLOWED BY** [COMPLETED] dextrose 50 % injection 25 g, 25 g, intravenous, Once, 25 g at 03/05/24 0745 **FOLLOWED BY** dextrose 10 % in water (D10W) infusion, 50 mL/hr, intravenous, Continuous, Torres Feldman DO, Last Rate: 50 mL/hr at 03/05/24 0745, 50 mL/hr at 03/05/24 0745    docusate sodium (Colace) capsule 100 mg, 100 mg, oral, BID, Warner Rodas MD, 100 mg at 03/03/24 2029    fentanyl (Sublimaze) 1000 mcg in sodium chloride 0.9% 100 mL (10 mcg/mL) infusion (premix),  mcg/hr, intravenous, Continuous, Torres Feldman DO    [Held by provider] furosemide (Lasix) tablet 40 mg, 40 mg, oral, Daily, Warner Rodas MD, 40 mg at 03/04/24 1019    [Held by provider] gabapentin (Neurontin) capsule 600 mg, 600 mg, oral, BID, Warner Rodas MD, 600 mg at 03/04/24 1019    [Held by provider] heparin (porcine) injection 2,000-4,000 Units, 2,000-4,000 Units, intravenous, q4h PRN, Torres Feldman DO    [Held by provider] heparin 25,000 Units in dextrose 5% 250 mL (100 Units/mL) infusion (premix), 0-4,500 Units/hr, intravenous, Continuous, [facility] Joaquin Rodas MD, Stopped at 03/05/24 0621    hydrALAZINE (Apresoline) injection 10 mg, 10 mg, intravenous, q6h PRN, Torres Feldman DO, 10 mg at  03/04/24 1720    HYDROmorphone (Dilaudid) injection 0.6 mg, 0.6 mg, intravenous, q2h PRN, Torres Feldman DO    ipratropium-albuteroL (Duo-Neb) 0.5-2.5 mg/3 mL nebulizer solution 3 mL, 3 mL, nebulization, TID, Torres Feldman DO, 3 mL at 03/05/24 0713    ipratropium-albuteroL (Duo-Neb) 0.5-2.5 mg/3 mL nebulizer solution 3 mL, 3 mL, nebulization, q4h PRN, Torres Feldman DO    ketamine in NaCl, iso-osmotic injection  - Omnicell Override Pull, , , ,     ketamine in NaCl, iso-osmotic injection  - Omnicell Override Pull, , , ,     ketamine in NaCl, iso-osmotic injection 100 mg, 100 mg, intravenous, Once, Torres Feldman DO    [Held by provider] lisinopril tablet 5 mg, 5 mg, oral, Daily, Warner Rodas MD, 5 mg at 03/04/24 1019    [Held by provider] methadone (Dolophine) tablet 5 mg, 5 mg, oral, q6h, Warner Rodas MD, 5 mg at 03/05/24 0545    methylPREDNISolone sod succinate (PF) (SOLU-Medrol) 40 mg/mL injection 40 mg, 40 mg, intravenous, q12h, Torres Feldman DO, 40 mg at 03/05/24 0757    [Held by provider] metoprolol succinate XL (Toprol-XL) 24 hr tablet 50 mg, 50 mg, oral, q PM, Warner Rodas MD, 50 mg at 03/03/24 2029    midazolam (Versed) injection  - Omnicell Override Pull, , , ,     midazolam (Versed) injection  - Omnicell Override Pull, , , ,     midazolam (Versed) injection 2 mg, 2 mg, intravenous, Once, Torres Feldman DO    midazolam (Versed) injection 2 mg, 2 mg, intravenous, Once, Torres Feldman DO    midazolam (Versed) injection, , intravenous, Code/trauma/sedation med, Torres Feldman DO, 2 mg at 03/05/24 0947    naloxone (Narcan) injection 1 mg, 1 mg, intravenous, PRN, Torres Feldman DO    nitroglycerin (Nitrostat) SL tablet 0.4 mg, 0.4 mg, sublingual, q5 min PRN, Warner Rodas MD    oxygen (O2) therapy, , inhalation, Continuous PRN - O2/gases, Hiram Beck, APRN-CNP, Rate Change at 03/05/24 0946    pantoprazole (ProtoNix) 80 mg in sodium  chloride 0.9% 100 mL (0.8 mg/mL) infusion, 8 mg/hr, intravenous, Continuous, JOSE Romano    [Held by provider] polyethylene glycol (Glycolax, Miralax) packet 17 g, 17 g, oral, Daily, Warner Rodas MD, 17 g at 03/03/24 1501    propofol (Diprivan) infusion, 5-20 mcg/kg/min, intravenous, Continuous, Torres Feldman DO    propofol (Diprivan) injection  - Omnicell Override Pull, , , ,     rocuronium (ZeMuron) injection 80 mg, 80 mg, intravenous, Once, Torres Feldman DO    sodium bicarbonate 8.4 % (1 mEq/mL) 50 mEq, 50 mEq, intravenous, Once, JOSE Higgins    sodium chloride 0.9% infusion, 75 mL/hr, intravenous, Continuous, Torres Feldman DO, Last Rate: 75 mL/hr at 03/04/24 1457, 75 mL/hr at 03/04/24 1457    sodium zirconium cyclosilicate (Lokelma) packet 10 g, 10 g, oral, q8h, Torres Feldman DO, 10 g at 03/05/24 0758    vasopressin (Vasostrict) 0.2 unit/mL infusion, 0.03 Units/min, intravenous, Continuous, JOSE Higgins    vasopressin (Vasostrict) infusion  - Omnicell Override Pull, , , ,     Past Medical History  Active Ambulatory Problems     Diagnosis Date Noted    Vitamin D deficiency 06/08/2023    Drug therapy 06/08/2023    Hyperlipidemia 06/08/2023    COPD (chronic obstructive pulmonary disease) (CMS/[facility]) 06/08/2023    Coronary artery disease involving native heart without angina pectoris 06/08/2023    Current nicotine use 06/08/2023    Phantom limb (CMS/[facility]) 06/08/2023    Erectile dysfunction 06/08/2023    Routine general medical examination at health care facility 06/08/2023    Preventative health care 09/07/2023    Back pain 10/11/2023    Benign essential hypertension 10/11/2023    CAD S/P percutaneous coronary angioplasty 10/11/2023    CHF (congestive heart failure), NYHA class II (CMS/[facility]) 10/11/2023    Current every day smoker 10/11/2023    Difficulty breathing 10/11/2023    GERD (gastroesophageal reflux disease) 10/11/2023    Intermittent  claudication (CMS/[facility]) 10/11/2023    Ischemic cardiomyopathy 10/11/2023    Left leg pain 10/11/2023    Pain in both lower extremities 10/11/2023    Lumbar spondylosis 10/11/2023    Presence of double chamber automatic cardioverter/defibrillator (AICD) 10/11/2023    PVD (peripheral vascular disease) (CMS/[facility]) 10/11/2023    S/P AKA (above knee amputation) unilateral (CMS/HCC) 10/11/2023    Underweight 10/11/2023    White blood cells present on urine microscopy 10/11/2023    Body mass index less than 19 12/13/2023     Resolved Ambulatory Problems     Diagnosis Date Noted    CAD (coronary artery disease) 10/11/2023    BMI less than 19,adult 12/13/2023    Inappropriate shocks from ICD (implantable cardioverter-defibrillator) 12/14/2023     Past Medical History:   Diagnosis Date    Anesthesia of skin     Angina, class IV (CMS/HCC)     CHF (congestive heart failure) (CMS/HCC)     Chronic obstructive pulmonary disease with (acute) exacerbation (CMS/HCC) 12/08/2022    Coronary artery disease     Cough, unspecified     Disorder of prostate, unspecified     Encounter for general adult medical examination without abnormal findings 12/09/2021    Encounter for immunization 10/16/2020    Functional dyspepsia     Hypertension     Local infection of the skin and subcutaneous tissue, unspecified 09/08/2020    Mixed hyperlipidemia 12/09/2021    Muscle weakness (generalized)     Nicotine dependence, cigarettes, uncomplicated 10/16/2020    Other iron deficiency anemias 10/11/2021    Other spondylosis, cervical region 03/16/2022    Pain in leg, unspecified 03/16/2022    Personal history of other diseases of the circulatory system     Personal history of other diseases of the circulatory system     Personal history of other diseases of the circulatory system 12/09/2021    Personal history of other diseases of the circulatory system 03/16/2022    Personal history of other diseases of the musculoskeletal system and connective tissue      Personal history of other diseases of the musculoskeletal system and connective tissue 03/16/2022    Personal history of other diseases of the musculoskeletal system and connective tissue 03/16/2022    Personal history of other specified conditions     Personal history of other specified conditions     Personal history of other specified conditions     Postnasal drip     Pseudofolliculitis barbae 02/27/2018    Shortness of breath     Snoring        Past Surgical History  Past Surgical History:   Procedure Laterality Date    CARDIAC CATHETERIZATION Left 1/15/2024    Procedure: Left Heart Cath;  Surgeon: Zen Suarez MD;  Location: [facility];  Service: Cardiovascular;  Laterality: Left;  LHC possible. AKA on right side    CORONARY ANGIOPLASTY WITH STENT PLACEMENT  04/27/2017    Cath Placement Of Stent 1    OTHER SURGICAL HISTORY  11/09/2021    Tooth extraction    OTHER SURGICAL HISTORY  11/09/2021    Leg surgery    OTHER SURGICAL HISTORY  11/09/2021    Cardioverter defibrillator insertion    OTHER SURGICAL HISTORY  11/09/2021    Amputation Of Leg Above Knee    OTHER SURGICAL HISTORY  04/03/2019    Cardiac catheterization with stent placement       Family History  Family History   Problem Relation Name Age of Onset    Alzheimer's disease Mother  74    Other (old age) Father  81     Unknown if family history of stomach or colon cancer    Social History  TOBACCO:  reports that he has been smoking cigarettes. He has been smoking an average of .2 packs per day. He has never used smokeless tobacco.  ETOH:  reports that he does not currently use alcohol.  Used to be a heavy drinker for 20+ years, but none for 10-12 years now  DRUGS:  reports no history of drug use.  MARITAL STATUS:   OCCUPATION:    Review of Systems  All 10 systems reviewed with the patient/family and negative except for what is mentioned in HPI      PHYSICAL EXAM  VS: /71   Pulse 101   Temp 36.5 °C (97.7 °F) (Temporal)   Resp 26   Ht  "1.626 m (5' 4\")   Wt (!) 42.2 kg (93 lb 0.6 oz)   SpO2 100%   BMI 15.97 kg/m²  Body mass index is 15.97 kg/m².  Physical Exam  Vitals reviewed.   Constitutional:       Comments: Intubated on sedation   HENT:      Head: Normocephalic.      Nose: Nose normal.      Mouth/Throat:      Mouth: Mucous membranes are moist.      Pharynx: Oropharynx is clear.   Eyes:      Extraocular Movements: Extraocular movements intact.      Conjunctiva/sclera: Conjunctivae normal.      Pupils: Pupils are equal, round, and reactive to light.   Cardiovascular:      Rate and Rhythm: Normal rate and regular rhythm.      Pulses: Normal pulses.      Heart sounds: Normal heart sounds.   Pulmonary:      Effort: Pulmonary effort is normal.      Comments: Intubated on sedation  Abdominal:      General: Bowel sounds are normal. There is no distension.      Palpations: Abdomen is soft.      Tenderness: There is no abdominal tenderness. There is no guarding or rebound.   Musculoskeletal:         General: Normal range of motion.      Cervical back: Normal range of motion.      Comments: Right AKA   Skin:     General: Skin is warm and dry.   Neurological:      Comments: Unable to assess, intubated on sedation   Psychiatric:      Comments: Intubated on sedation          DATA  Recent blood work and relevant radiology and endoscopic studies were reviewed and discussed with the patient   Results from last 7 days   Lab Units 03/05/24  0629   WBC AUTO x10*3/uL 12.1*   RBC AUTO x10*6/uL 2.31*   HEMOGLOBIN g/dL 8.5*   HEMATOCRIT % 26.0*   MCV fL 113*   MCHC g/dL 32.7   RDW % 13.1   PLATELETS AUTO x10*3/uL 80*       Results from last 72 hours   Lab Units 03/05/24  0629 03/04/24  0539 03/03/24  0647   SODIUM mmol/L 130*   < > 132*   POTASSIUM mmol/L 6.8*   < > 4.3   CHLORIDE mmol/L 95*   < > 93*   CO2 mmol/L 34*   < > 32   BUN mg/dL 44*   < > 9   CREATININE mg/dL 1.20   < > 0.72   CALCIUM mg/dL 7.9*   < > 9.4   PROTEIN TOTAL g/dL  --   --  7.3   BILIRUBIN " TOTAL mg/dL  --   --  0.5   ALK PHOS U/L  --   --  59   AST U/L  --   --  25   ALT U/L  --   --  17    < > = values in this interval not displayed.       Results from last 72 hours   Lab Units 03/05/24  0636   INR  1.1             RADIOLOGY REVIEW  CT A/P 3/3/2024:  IMPRESSION:  Mid infrarenal abdominal aortic aneurysm measures 39 x 39 mm,  involves the infrarenal aorta from 7 cm proximal to its bifurcation  all the way to the bifurcation. There was no abdominal aortic  aneurysm on CT abdomen and pelvis 25 July 2012, the most recent  pertinent comparison exam. There is definitely mural thrombus within  the abdominal aortic aneurysm on today's exam, some of which may be  recent, although the attenuation does not suggest hyperacute blood.  There are no acute blood products or inflammatory changes around the  abdominal aortic aneurysm. Follow-up three-phase CTA without and with  contrast in arterial and venous phases recommended for further  evaluation of the abdominal aortic aneurysm. It is for this finding I  have activated the Yellow Alert associated with this exam      4.5 x 1.5 x 4.5 cm probable hematoma in the subcutaneous fat  superficial to and separate from the left rectus bundle, left  paramidline ventral abdominal wall, which I have annotated with a  [employer] [place] on axial image 71 and sagittal image 83. Was this a  subcutaneous injection site?      No other acute/subacute hemorrhage/hematoma anywhere else in the  abdominal wall and none at all in the abdomen or pelvis proper      No acute appendicitis, diverticulitis, other colitis, bowel  obstruction, perforation, abscess or free fluid      No acute pancreatitis, hydronephrosis/urinary stone or any other  acute solid organ findings      No gallbladder or biliary duct dilation        ENDOSCOPIC REVIEW  No evidence of prior EGD or colonoscopy in the EMR        IMPRESSION/RECOMMENDATIONS  The patient is a 73 y.o. male with signficant past medical history of CAD  status post PCI-on ASA and Plavix, ischemic cardiomyopathy status post ICD, systolic CHF, PVD status post Rt AKA, HTN, HLD, COPD, who presented with worsening shortness of breath.     Patient was started on heparin drip this admission due to AAA with thrombus.      patient has been on BiPAP since admission.  Patient transferred to the ICU this am, 3/5 for hypotension and multiple episodes of dark red blood per rectum, x4-5.  No vomiting noted.  Patient intubated and started on vasopressor.            # GIB- likely rapid UGIB in the setting of anticoagulation- elevated BUN: Cr ratio.  No known history of liver disease/cirrhosis.  No evidence of cirrhosis on CT.  LFTs and albumin WNL.  INR 1.1.  Patient does have thrombocytopenia with platelets 80.  History of EtOH abuse 20+ years, quit 10-12 years ago.  Of note, there is also a possible 4.5 x 1.5 x 4.5 cm hematoma in the subcutaneous fat in the ventral abdominal wall on CT.  # Macrocytic anemia- Hemoglobin dropped in 1 day from 13.7->8.5 today. Massive blood transfusion protocol initiated with plan for 4 units PRBCs.  #History of CAD s/p PCI-on ASA and Plavix, last dose 3/4 AM.  Currently held.    #AAA with thrombus-started on heparin drip this admission.  Heparin drip held today, 3/5 about 7 AM per RN when rectal bleeding noted.  #Hyperkalemia          Plan  -EGD today once resuscitated  -Administered PPI bolus, followed by PPI drip  -trend hgb and hct  -monitor consistency and color of stool. Monitor for signs of overt GI bleeding  -tranfuse for hgb < 8.0  -avoid NSAIDs  -Correct hyperkalemia prior to procedure. Repeat potasium today 1300  -Continue to hold anticoagulation/antiplatelets prior to procedure  -Continue supportive care per critical care team                  Plan discussed with Dr. Kaye.  GI will continue to follow.  Total of 60 minutes spent on visit and overall professional care of the patient  (Electronically signed byMICAELA Mendez-CNP on  3/5/2024 at 10:23 AM)     Inpatient consult to Gastroenterology  Consult performed by: MICAELA Romano-CNP  Consult ordered by: Torres Feldman DO

## 2024-03-05 NOTE — SIGNIFICANT EVENT
[facility] SIGNIFICANT EVENT NOTE:    Date:  3/5/2024  Patient:  Luis Peacock Jr.  YOB: 1950  MRN:  [id number]   Admit Date:  3/3/2024  =============================================================================================    I was called to bedside by nursing at 0700    Concerns: GI Bleed and Hyperkalemia     Plan  -Pause Heparin and give Protamine   -PPI bolus and then BID   -1U PRBCs now   -Coags  -HyperK Meds   -GI Consult

## 2024-03-05 NOTE — PROCEDURES
Arterial Line Insertion    Indication: Hemorrhagic Shock    Catheter: 20g, 12cm    Procedure  The patient was prepped and draped in the normal sterile fashion.  The ultrasound probe was draped with a sterile probe cover. Using ultrasound guidance, the Right Brachial artery was identified and cannulated.  There was good pulsatile flow through the needle.  A wire was then introduced into the artery.  The needle was removed and using the seldinger technique, the catheter was advanced over the wire.  The catheter was then secured with sutures and connected to the monitor with a good arterial waveform noted.  A dressing was applied.      Complications: None      The patient tolerated the procedure well.    MICAELA Higgins-CNP  Pulmonary & Critical Care Medicine  [facility]

## 2024-03-06 NOTE — PROGRESS NOTES
Pt remains intubated and sedated . Continued ventilator support.   On multiple pressors.   Gi following:  pt underwent EGD yesterday and required repeat EGD today with transfusion of additional blood products.    Care transitions will continue to follow , dc plans remain TBD at present time.      Jacklyn Holloway RN

## 2024-03-06 NOTE — PROGRESS NOTES
Nephrology Progress Note    Assessment:  73 y.o. male with history s/f CAD s/p PCI, ICM s/p ICD placement,PVD s/p RT AKA, HLD, HTN, COPD who presented w/ SOB for 1 day.      Hyponatremia:L 2/2 volume depletion, Na trending down since admission, was getting dose of lasix  Shock: onffpressors   RSV infection   Large infrarenal AAA w/ mural thrombus: on AC   Acute on chronic hypercarbic respiratory failure: intubated   KRYSTLE: most likely 2/2 shock +/- contrast induced nephropathy  Hyperkalemia: in setting of hypercarbia, lisinopril in setting of KRYSTLE     Plan:  - monitor function and UOP  - will sign off as renal issues have resolved, please re-consult PRN       Subjective:  Admit Date: 3/3/2024    Interval History: remains intubated, hyperkalemia and renal failure have resolved     Medications:  Scheduled Meds:[Held by provider] amLODIPine, 5 mg, oral, Daily  [Held by provider] aspirin, 81 mg, oral, Daily  [Held by provider] atorvastatin, 40 mg, oral, Nightly  calcium chloride, 1 g, intravenous, Once  calcium chloride, 1 g, intravenous, Once  calcium chloride, 1 g, intravenous, Once  ceFAZolin, 1 g, intravenous, q8h  [Held by provider] clopidogrel, 75 mg, oral, Daily  [Held by provider] docusate sodium, 100 mg, oral, BID  [Held by provider] furosemide, 40 mg, oral, Daily  [Held by provider] gabapentin, 600 mg, oral, BID  ipratropium-albuteroL, 3 mL, nebulization, TID  [Held by provider] lisinopril, 5 mg, oral, Daily  [Held by provider] methadone, 5 mg, oral, q6h  methylPREDNISolone sodium succinate (PF), 40 mg, intravenous, q12h  [Held by provider] metoprolol succinate XL, 50 mg, oral, q PM  pantoprazole, 40 mg, intravenous, BID  [Held by provider] polyethylene glycol, 17 g, oral, Daily      Continuous Infusions:dexmedeTOMIDine, 0.1-1.5 mcg/kg/hr  fentaNYL,  mcg/hr, Last Rate: 125 mcg/hr (03/06/24 1200)  [Held by provider] heparin, 0-4,500 Units/hr, Last Rate: Stopped (03/05/24 0621)  norepinephrine, 0.01-1  "mcg/kg/min, Last Rate: Stopped (03/06/24 0900)  propofol, 5-50 mcg/kg/min, Last Rate: 50 mcg/kg/min (03/06/24 1130)        CBC:   Lab Results   Component Value Date    WBC 15.6 (H) 03/06/2024    RBC 3.10 (L) 03/06/2024    HGB 10.2 (L) 03/06/2024    HCT 29.1 (L) 03/06/2024    MCV 94 03/06/2024    MCH 32.9 03/06/2024    MCHC 35.1 03/06/2024    RDW 19.9 (H) 03/06/2024    PLT 82 (L) 03/06/2024     BMP:    Lab Results   Component Value Date     03/06/2024    K 5.1 03/06/2024     03/06/2024    CO2 31 03/06/2024    BUN 40 (H) 03/06/2024    CREATININE 0.80 03/06/2024    CALCIUM 10.2 03/06/2024    GLUCOSE 128 (H) 03/06/2024       Objective:  Vitals: /58   Pulse 78   Temp 36.3 °C (97.3 °F) (Temporal)   Resp 22   Ht 1.626 m (5' 4\")   Wt (!) 42.2 kg (93 lb 0.6 oz)   SpO2 100%   BMI 15.97 kg/m²    Wt Readings from Last 3 Encounters:   03/04/24 (!) 42.2 kg (93 lb 0.6 oz)   02/01/24 (!) 43.1 kg (95 lb)   01/15/24 (!) 43.3 kg (95 lb 7.4 oz)      24HR INTAKE/OUTPUT:    Intake/Output Summary (Last 24 hours) at 3/6/2024 1212  Last data filed at 3/6/2024 1000  Gross per 24 hour   Intake 5905.41 ml   Output 1190 ml   Net 4715.41 ml         General: intubated, sedated  HEENT: normocephalic, atraumatic, ETT in place  Lungs: intubated, on vent, coarse breath sounds  Heart: regular rate and rhythm, no murmurs or rubs  Abdomen: soft, non-tender, non-distended  MSK: no joint swelling or tenderness  Ext: no cyanosis, no peripheral edema  Neuro: unable to assess, sedated      Electronically signed by Kit Adkins MD, MD              "

## 2024-03-06 NOTE — CARE PLAN
The clinical goals for the shift include Patient will remain free from additional injury for the remainder of the shift.    Over the shift, the patient did make progress toward the following goals.    Problem: Pain - Adult  Goal: Verbalizes/displays adequate comfort level or baseline comfort level  Outcome: Progressing     Problem: Safety - Adult  Goal: Free from fall injury  Outcome: Progressing     Problem: Discharge Planning  Goal: Discharge to home or other facility with appropriate resources  Outcome: Progressing     Problem: Chronic Conditions and Co-morbidities  Goal: Patient's chronic conditions and co-morbidity symptoms are monitored and maintained or improved  Outcome: Progressing     Problem: Skin  Goal: Prevent/manage excess moisture  Outcome: Progressing  Flowsheets (Taken 3/6/2024 1621)  Prevent/manage excess moisture:   Cleanse incontinence/protect with barrier cream   Moisturize dry skin  Goal: Promote/optimize nutrition  Outcome: Progressing  Flowsheets (Taken 3/6/2024 1621)  Promote/optimize nutrition:   Reassess MST if dietician not consulted   Discuss with provider if NPO > 2 days     Problem: Indwelling Catheter Maintenance  Goal: I will have no complications from indwelling catheter  Outcome: Progressing     Problem: Physical Restraint  Goal: I will require minimum level of restraint  Outcome: Progressing     Problem: Safety - Medical Restraint  Goal: Remains free of injury from restraints (Restraint for Interference with Medical Device)  Outcome: Progressing  Goal: Free from restraint(s) (Restraint for Interference with Medical Device)  Outcome: Progressing

## 2024-03-06 NOTE — CONSULTS
"Nutrition Initial Assessment:   Nutrition Assessment         Patient is a 73 y.o. male presenting with COPD      Nutrition History:  Energy Intake:  (NPO)  Food and Nutrient History: RD consulted for low BMI. Nutrition history limited at this time d/t intubated and no family at bedside. Per EMR, history of alcohol abuse, though no alcohol use last 10-12 years. No TF at this time d/t GIB. Plans for repeat EGD today. Per RN, no OG currently in place. S/p massive blood transfusion protocol 3/5. To monitor for nutrition plan of care.  Vitamin/Herbal Supplement Use: vitamin D  Food Allergies/Intolerances:  None  GI Symptoms:  distended  Oral Problems:  unknown prior to intubation       Anthropometrics:  Height: 162.6 cm (5' 4\")   Weight: (!) 42.2 kg (93 lb 0.6 oz)   BMI (Calculated): 15.96             Weight History:     Wt Readings from Last 15 Encounters:   03/04/24 (!) 42.2 kg (93 lb 0.6 oz)   02/01/24 (!) 43.1 kg (95 lb)   01/15/24 (!) 43.3 kg (95 lb 7.4 oz)   12/14/23 (!) 43.1 kg (95 lb)   12/13/23 (!) 43.1 kg (95 lb)   12/12/23 (!) 44.5 kg (98 lb)   09/07/23 (!) 42.6 kg (94 lb)   07/11/23 (!) 40.8 kg (90 lb)   06/13/23 (!) 44.5 kg (98 lb)   06/08/23 (!) 43.1 kg (95 lb)   01/10/23 (!) 44.9 kg (99 lb)   12/08/22 46.4 kg (102 lb 4 oz)   11/23/22 44.5 kg (98 lb)   06/09/22 44.7 kg (98 lb 9 oz)   05/24/22 44.9 kg (99 lb)         Weight Change %:  Significant Weight Loss: No    Nutrition Focused Physical Exam Findings:  defer: constraints of critical care    Edema:  Edema Location: sacral edema documented by nursing on 3/5, no severity documented  Physical Findings:  Skin: Negative (for pressure injuries)    Nutrition Significant Labs:  BMP Trend:   Results from last 7 days   Lab Units 03/06/24  2627 03/05/24  1154 03/05/24  0629 03/04/24  0539   GLUCOSE mg/dL 128* 142* 97 106*   CALCIUM mg/dL 10.2 12.1* 7.9* 9.7   SODIUM mmol/L 136 136 130* 126*   POTASSIUM mmol/L 5.1 4.7 6.8* 5.2   CO2 mmol/L 31 29 34* 34*   CHLORIDE " mmol/L 100 102 95* 86*   BUN mg/dL 40* 40* 44* 16   CREATININE mg/dL 0.80 0.88 1.20 0.61        Nutrition Specific Medications:  Reviewed. No pressors. Propofol at 11.39 ml/hr, providing ~300 kcal    I/O:   Last BM Date: 03/05/24; Stool Appearance: Bloody (03/05/24 2030)    Dietary Orders (From admission, onward)       Start     Ordered    03/05/24 0732  NPO Diet; Effective now  Diet effective now         03/05/24 0731    03/03/24 1003  May Participate in Room Service  Once        Question:  .  Answer:  Yes    03/03/24 1003                     Estimated Needs:   Total Energy Estimated Needs (kCal): 1480 kCal  Method for Estimating Needs: 9858-3811 kcal (30-35 kcal/kg)  Total Protein Estimated Needs (g): 68 g  Method for Estimating Needs: 51-85g (1.2-2 g/kg)  Total Fluid Estimated Needs (mL): 1480 mL  Method for Estimating Needs: 1 ml/kcal or per MD        Nutrition Diagnosis   Malnutrition Diagnosis  Patient has Malnutrition Diagnosis: No (insufficient data to diagnose though suspect some degree of malnutrition is present given low BMI)    Nutrition Diagnosis  Patient has Nutrition Diagnosis: Yes  Diagnosis Status (1): New  Nutrition Diagnosis 1: Inadequate oral intake  Related to (1): acute illness  As Evidenced by (1): intubation, GIB, NPO without alternative nutrition       Nutrition Interventions/Recommendations         Nutrition Prescription:  Individualized Nutrition Prescription Provided for : NPO. If GIB resolves and unable to extubate, consider EN. Consult RD for recommendations/ordering when appropriate.        Nutrition Interventions:     Collaboration and Referral of Nutrition Care: Collaboration by nutrition professional with other providers  Goal: ICU interdisciplinary rounds; RN    Nutrition Education:      Not appropriate at this time.    Nutrition Monitoring and Evaluation   Food/Nutrient Related History Monitoring  Additional Plans: Monitor for nutrition plan of care    Body  Composition/Growth/Weight History  Monitoring and Evaluation Plan: Weight  Weight: Measured weight  Criteria: Maintain stable wt    Biochemical Data, Medical Tests and Procedures  Monitoring and Evaluation Plan: Electrolyte/renal panel, Glucose/endocrine profile  Criteria: WNL  Glucose/Endocrine Profile: Glucose, casual  Criteria: BG within desirable range            Time Spent/Follow-up Reminder:   Time Spent (min): 45 minutes  Last Date of Nutrition Visit: 03/06/24  Nutrition Follow-Up Needed?: Other (Comment)

## 2024-03-06 NOTE — PROGRESS NOTES
Department of Internal Medicine  Gastroenterology  Progress note      Subjective  GI is following for GIB      The patient had smaller amount of rectal bleeding last night s/p EGD yesterday. Patient is having red blood suctioned from his mouth every 2 hours per RN. Hgb 8.5->10.2 with 4 uPRBCs, 4FFP and 1 unit plts.  On multiple pressors. remains intubated.     Current Medication    Current Facility-Administered Medications:     [Held by provider] acetaminophen (Tylenol) oral liquid 650 mg, 650 mg, oral, q4h PRN **OR** [Held by provider] acetaminophen (Tylenol) tablet 650 mg, 650 mg, oral, q4h PRN, Warner Rodas MD, 650 mg at 03/04/24 0124    [Held by provider] amLODIPine (Norvasc) tablet 5 mg, 5 mg, oral, Daily, Warner Rodas MD, 5 mg at 03/04/24 1019    [Held by provider] aspirin chewable tablet 81 mg, 81 mg, oral, Daily, Warner Rodas MD, 81 mg at 03/04/24 1019    [Held by provider] atorvastatin (Lipitor) tablet 40 mg, 40 mg, oral, Nightly, Warner Rodas MD, 40 mg at 03/03/24 2029    calcium chloride 1 g in dextrose 5 % in water (D5W) 50 mL IV, 1 g, intravenous, Once, MICAELA Higgins-CNP    calcium chloride 1 g in dextrose 5 % in water (D5W) 50 mL IV, 1 g, intravenous, Once, Hiram Beck APRN-CNP    calcium chloride 1 g in dextrose 5 % in water (D5W) 50 mL IV, 1 g, intravenous, Once, Hiram Beck APRN-CNP    ceFAZolin in dextrose (iso-os) (Ancef) IVPB 1 g, 1 g, intravenous, q8h, Torres Feldman DO, Stopped at 03/06/24 0715    [Held by provider] clopidogrel (Plavix) tablet 75 mg, 75 mg, oral, Daily, Warner Rodas MD, 75 mg at 03/04/24 1019    dexmedeTOMIDine in NS (Precedex) 400 mcg in 100 mL (4 mcg/mL) infusion, 0.1-1.5 mcg/kg/hr, intravenous, Continuous, Hiram Beck, APRN-CNP, Last Rate: 2.11 mL/hr at 03/06/24 0945, 0.2 mcg/kg/hr at 03/06/24 0945    [Held by provider] docusate sodium (Colace) capsule 100 mg, 100 mg, oral, BID, Warnre Nuñez  MD Clark, 100 mg at 03/03/24 2029    fentanyl (Sublimaze) 1000 mcg in sodium chloride 0.9% 100 mL (10 mcg/mL) infusion (premix),  mcg/hr, intravenous, Continuous, Torres Feldman DO, Last Rate: 7.5 mL/hr at 03/06/24 0900, 75 mcg/hr at 03/06/24 0900    [Held by provider] furosemide (Lasix) tablet 40 mg, 40 mg, oral, Daily, Warner Rodas MD, 40 mg at 03/04/24 1019    [Held by provider] gabapentin (Neurontin) capsule 600 mg, 600 mg, oral, BID, Warner Rodas MD, 600 mg at 03/04/24 1019    [Held by provider] heparin (porcine) injection 2,000-4,000 Units, 2,000-4,000 Units, intravenous, q4h PRN, Torres Feldman DO    [Held by provider] heparin 25,000 Units in dextrose 5% 250 mL (100 Units/mL) infusion (premix), 0-4,500 Units/hr, intravenous, Continuous, Warner Rodas MD, Stopped at 03/05/24 0621    ipratropium-albuteroL (Duo-Neb) 0.5-2.5 mg/3 mL nebulizer solution 3 mL, 3 mL, nebulization, TID, Torres Feldman DO, 3 mL at 03/06/24 0718    ipratropium-albuteroL (Duo-Neb) 0.5-2.5 mg/3 mL nebulizer solution 3 mL, 3 mL, nebulization, q4h PRN, Torres Feldman DO    [Held by provider] lisinopril tablet 5 mg, 5 mg, oral, Daily, Warner Rodas MD, 5 mg at 03/04/24 1019    [Held by provider] methadone (Dolophine) tablet 5 mg, 5 mg, oral, q6h, Warner Rodas MD, 5 mg at 03/05/24 0545    methylPREDNISolone sod succinate (PF) (SOLU-Medrol) 40 mg/mL injection 40 mg, 40 mg, intravenous, q12h, Torres Feldman DO, 40 mg at 03/06/24 0849    [Held by provider] metoprolol succinate XL (Toprol-XL) 24 hr tablet 50 mg, 50 mg, oral, q PM, Warner Rodas MD, 50 mg at 03/03/24 2029    [Held by provider] naloxone (Narcan) injection 1 mg, 1 mg, intravenous, PRN, Torres Feldman DO    norepinephrine (Levophed) 8 mg in dextrose 5% 250 mL (0.032 mg/mL) infusion (premix), 0.01-1 mcg/kg/min, intravenous, Continuous, Abiola Cabrera, APRN-CNP, Stopped at 03/06/24 0900    oxygen (O2)  therapy, , inhalation, Continuous PRN - O2/gases, MICAELA Higgins-CNP, Rate Verify at 03/06/24 0800    pantoprazole (ProtoNix) injection 40 mg, 40 mg, intravenous, BID, Abiola Cabrera, APRN-CNP, 40 mg at 03/06/24 0849    [Held by provider] polyethylene glycol (Glycolax, Miralax) packet 17 g, 17 g, oral, Daily, Warner Rodas MD, 17 g at 03/03/24 1501    propofol (Diprivan) infusion, 5-50 mcg/kg/min, intravenous, Continuous, Hiram Beck APRN-CNP    Past Medical History  Active Ambulatory Problems     Diagnosis Date Noted    Vitamin D deficiency 06/08/2023    Drug therapy 06/08/2023    Hyperlipidemia 06/08/2023    COPD (chronic obstructive pulmonary disease) (CMS/HCC) 06/08/2023    Coronary artery disease involving native heart without angina pectoris 06/08/2023    Current nicotine use 06/08/2023    Phantom limb (CMS/HCC) 06/08/2023    Erectile dysfunction 06/08/2023    Routine general medical examination at health care facility 06/08/2023    Preventative health care 09/07/2023    Back pain 10/11/2023    Benign essential hypertension 10/11/2023    CAD S/P percutaneous coronary angioplasty 10/11/2023    CHF (congestive heart failure), NYHA class II (CMS/HCC) 10/11/2023    Current every day smoker 10/11/2023    Difficulty breathing 10/11/2023    GERD (gastroesophageal reflux disease) 10/11/2023    Intermittent claudication (CMS/HCC) 10/11/2023    Ischemic cardiomyopathy 10/11/2023    Left leg pain 10/11/2023    Pain in both lower extremities 10/11/2023    Lumbar spondylosis 10/11/2023    Presence of double chamber automatic cardioverter/defibrillator (AICD) 10/11/2023    PVD (peripheral vascular disease) (CMS/HCC) 10/11/2023    S/P AKA (above knee amputation) unilateral (CMS/HCC) 10/11/2023    Underweight 10/11/2023    White blood cells present on urine microscopy 10/11/2023    Body mass index less than 19 12/13/2023     Resolved Ambulatory Problems     Diagnosis Date Noted    CAD (coronary  "artery disease) 10/11/2023    BMI less than 19,adult 12/13/2023    Inappropriate shocks from ICD (implantable cardioverter-defibrillator) 12/14/2023     Past Medical History:   Diagnosis Date    Anesthesia of skin     Angina, class IV (CMS/[facility])     CHF (congestive heart failure) (CMS/[facility])     Chronic obstructive pulmonary disease with (acute) exacerbation (CMS/[facility]) 12/08/2022    Coronary artery disease     Cough, unspecified     Disorder of prostate, unspecified     Encounter for general adult medical examination without abnormal findings 12/09/2021    Encounter for immunization 10/16/2020    Functional dyspepsia     Hypertension     Local infection of the skin and subcutaneous tissue, unspecified 09/08/2020    Mixed hyperlipidemia 12/09/2021    Muscle weakness (generalized)     Nicotine dependence, cigarettes, uncomplicated 10/16/2020    Other iron deficiency anemias 10/11/2021    Other spondylosis, cervical region 03/16/2022    Pain in leg, unspecified 03/16/2022    Personal history of other diseases of the circulatory system     Personal history of other diseases of the circulatory system     Personal history of other diseases of the circulatory system 12/09/2021    Personal history of other diseases of the circulatory system 03/16/2022    Personal history of other diseases of the musculoskeletal system and connective tissue     Personal history of other diseases of the musculoskeletal system and connective tissue 03/16/2022    Personal history of other diseases of the musculoskeletal system and connective tissue 03/16/2022    Personal history of other specified conditions     Personal history of other specified conditions     Personal history of other specified conditions     Postnasal drip     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       DATA  Recent blood work and relevant radiology and endoscopic studies were reviewed and discussed with the patient   Results from last 7 days   Lab Units 03/06/24  0356   WBC AUTO x10*3/uL 15.6*   RBC AUTO x10*6/uL 3.10*   HEMOGLOBIN g/dL 10.2*   HEMATOCRIT % 29.1*   MCV fL 94   MCHC g/dL 35.1   RDW % 19.9*   PLATELETS AUTO x10*3/uL 82*       Results from last 72 hours   Lab Units 03/06/24  0356   SODIUM mmol/L 136   POTASSIUM mmol/L 5.1   CHLORIDE mmol/L 100   CO2 mmol/L 31   BUN mg/dL 40*   CREATININE mg/dL 0.80   CALCIUM mg/dL 10.2   PROTEIN TOTAL g/dL 5.2*   BILIRUBIN TOTAL mg/dL 0.4   ALK PHOS U/L 35   AST U/L 31   ALT U/L 19       Results from last 72 hours   Lab Units 03/05/24  1154   INR  1.0         EGD 3/5/24:  Findings  The duodenum appeared normal.  Severe, generalized friable and hemorrhagic mucosa in the fundus of the stomach;  There was a large amount of old blood and clots in the stomach that partially obscured the views of the fundus and greater curvature.  There was linear trauma in the mid esophagus (likely from OGT trauma) that was oozing.  This stopped oozing with injection of Epi.  There was a significant amount of old blood and clots in the oropharynx, and bruising of the palate.      IMPRESSION/RECOMMENDATIONS  The patient is a 73 y.o. male with signficant past medical history of CAD status post PCI-on ASA and Plavix, ischemic cardiomyopathy status post ICD, systolic CHF, PVD status post Rt AKA, HTN, HLD, COPD, who presented with worsening shortness of breath.      Patient was started on heparin drip this admission due to AAA with thrombus.       patient has been on BiPAP since admission.  Patient transferred to the ICU this am, 3/5 for hypotension and multiple episodes of dark red blood per rectum, x4-5.  No vomiting noted.  Patient intubated and started on pressors.        s/p EGD 3/5 with results above. Since patient  still having red blood suctioned from his mouth and rectal bleeding, will repeat EGD today. Large amount of blood clots also obscuring views yesterday on EGD.             # GIB in the setting of anticoagulation- elevated BUN: Cr ratio.  No known history of liver disease/cirrhosis.  No evidence of cirrhosis on CT.  LFTs and albumin WNL.  INR 1.1.  Patient does have thrombocytopenia with platelets 80s.  History of EtOH abuse 20+ years, quit 10-12 years ago.  Of note, there is also a possible 4.5 x 1.5 x 4.5 cm hematoma in the subcutaneous fat in the ventral abdominal wall on CT.  # Macrocytic anemia- Hemoglobin dropped in 1 day from 13.7->8.5 today. Massive blood transfusion protocol initiated with 4 units PRBCs. 4 FFP, and 1 unit plts.  #History of CAD s/p PCI-on ASA and Plavix, last dose 3/4 AM.  Currently held.    #AAA with thrombus-started on heparin drip this admission.  Heparin drip held 3/5 about 7 AM per RN when rectal bleeding noted.  #Hyperkalemia - corrected              Plan  -repeat EGD today   -give Reglan 10 mg IV to increase transit of stomach contents and improve visibility   -Administered PPI bolus, followed by PPI drip  -trend hgb and hct  -monitor consistency and color of stool. Monitor for signs of overt GI bleeding  -tranfuse for hgb < 8.0  -avoid NSAIDs  -Continue to hold anticoagulation/antiplatelets prior to procedure  -Continue supportive care per critical care team                                Plan discussed with Dr. Delacruz. GI will continue to follow  Total of 30 min spent on overall professional care of the patient  (Electronically signed byJOSE Mendez on 3/6/2024 at 10:09 AM)    rolling walker

## 2024-03-06 NOTE — PROGRESS NOTES
[facility] Critical Care Medicine       Date:  3/6/2024  Patient:  Luis Peacock Jr.  YOB: 1950  MRN:  [id number]   Admit Date:  3/3/2024  ========================================================================================================    Chief Complaint   Patient presents with    Shortness of Breath     80% in RA, hx of CPOD, smoker, HF. Squad gave 2g Mg, 125 solumedrol and 3 duonebs         History of Present Illness:  Luis Peacock Jr. is a 73 y.o. male, from home with a past medical history of CAD status post PCI, ischemic cardiomyopathy status post ICD, systolic CHF, PVD status post Rt AKA, HTN, HLD, COPD, who presented with worsening shortness of breath after he woke up. He tried to use his home breathing treatment but no improvement. Called EMS and o their arrival he was saturating 80% on RA. They gave him an albuterol which did not improve him so they placed him on 3 DuoNeb treatments, given 125 of IV Solu-Medrol and 2 g of IV magnesium. Pt also is complaining of abdominal pain, and chest discomfort and is asking for relief of his distress. Pt denies LOC, bleeding, nausea, vomiting, diarrhea, constipation, urinary symptoms. Patient is from home and ambulates with crutches and wheelchair, lives with his family       Interval ICU Events:  3/4/2024: Patient transferred to the ICU for worsening hypercapnia and somnolence.     3/5: Mental status and agitation improved on BiPap and Precedex.  Remains with hypercapnea on ABG.  Early this AM noted to have melanotic stool and blood in mosley.  Heparin paused.  HGB down 3 grams on labs. 1U PRBCs ordered, protonix, K cocktail, and GI consulted.  Heparin reversed with protamine    Attending was called to bedside by nursing shortly after seeing the patient this morning on rounds. Patient hypotensive with more large melanotic stool present. He remains encephalopathic. Blood pressure on cuff 60 systolic. Levophed started, massive transfusion protocol  Purposeful Rounding    Patient Location Patient room    Patient willing to engage in conversationNo    Presentation/behavior Other Asleep*    Affect Neutral/Euthymic(normal)    Concerns reported: No    PRN medications given:No    Environmental assessment Room free from clutter and Clear path to bathroom     Fall prevention interventions in place: N/A    Daily Cristobal Fall Risk Score :59    Daily Soto Fall Risk Score : 0 initiated. He had already received protamine and IV Protonix push. GI consult is pending.   Emergent right IJ Cordis placed as well as right radial arterial line (please see separate procedure note). Please note stability of these procedures was not able to be obtained due to the patient's critical condition. Follow-up. Patient was then resuscitated with 4 units of PRBCs and 4 units of FFP with improvement of his blood pressure. He was then intubated using ketamine and rocuronium without incident. The decision was made to resuscitate the patient prior to induction for intubation to prevent cardiac arrest. He tolerated all these procedures well.      EGD showed the following.  The duodenum appeared normal.  Severe, generalized friable and hemorrhagic mucosa in the fundus of the stomach;  There was a large amount of old blood and clots in the stomach that partially obscured the views of the fundus and greater curvature.  There was linear trauma in the mid esophagus (likely from OGT trauma) that was oozing.  This stopped oozing with injection of Epi.  There was a significant amount of old blood and clots in the oropharynx, and bruising of the palate.        3/6: Overnight, patient continued to need suction with removal of a moderate amount of dark red fluid. This morning, patient had a bowel movement that contained bright red blood. No other acute events identified overnight. Patient's levo weaned down and discontinued this morning. Patient tolerating vent well with propofol and fentanyl for sedation and analgesic. Patient scheduled for repeat EGD this afternoon.       Medical History:  Past Medical History:   Diagnosis Date    Anesthesia of skin     Numbness    Angina, class IV (CMS/[facility]ly [facility])     CHF (congestive heart failure) (CMS/[facility])     Chronic obstructive pulmonary disease with (acute) exacerbation (CMS/[facility]) 12/08/2022    Acute exacerbation of chronic obstructive pulmonary disease (COPD)    Coronary artery disease      Cough, unspecified     Cough    Disorder of prostate, unspecified     Prostate disease    Encounter for general adult medical examination without abnormal findings 12/09/2021    Encounter for Medicare annual wellness exam    Encounter for immunization 10/16/2020    Encounter for immunization    Functional dyspepsia     Indigestion    Hypertension     Ischemic cardiomyopathy     Local infection of the skin and subcutaneous tissue, unspecified 09/08/2020    Staph skin infection    Mixed hyperlipidemia 12/09/2021    Mixed hyperlipidemia    Muscle weakness (generalized)     Localized muscle weakness    Nicotine dependence, cigarettes, uncomplicated 10/16/2020    Heavy cigarette smoker    Other iron deficiency anemias 10/11/2021    Hypochromic erythrocytes    Other spondylosis, cervical region 03/16/2022    Other spondylosis, cervical region    Pain in leg, unspecified 03/16/2022    Leg pain    Personal history of other diseases of the circulatory system     History of cardiac disorder    Personal history of other diseases of the circulatory system     History of hypertension    Personal history of other diseases of the circulatory system 12/09/2021    History of class IV angina pectoris    Personal history of other diseases of the circulatory system 03/16/2022    History of vascular disorder    Personal history of other diseases of the musculoskeletal system and connective tissue     History of arthritis    Personal history of other diseases of the musculoskeletal system and connective tissue 03/16/2022    History of low back pain    Personal history of other diseases of the musculoskeletal system and connective tissue 03/16/2022    History of neck pain    Personal history of other specified conditions     History of nausea    Personal history of other specified conditions     History of chest pain    Personal history of other specified conditions     History of heartburn    Postnasal drip     Post-nasal drip     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 AKA on right side    CORONARY ANGIOPLASTY WITH STENT PLACEMENT  04/27/2017    Cath Placement Of Stent 1    OTHER SURGICAL HISTORY  11/09/2021    Tooth extraction    OTHER SURGICAL HISTORY  11/09/2021    Leg surgery    OTHER SURGICAL HISTORY  11/09/2021    Cardioverter defibrillator insertion    OTHER SURGICAL HISTORY  11/09/2021    Amputation Of Leg Above Knee    OTHER SURGICAL HISTORY  04/03/2019    Cardiac catheterization with stent placement     Medications Prior to Admission   Medication Sig Dispense Refill Last Dose    albuterol 2.5 mg /3 mL (0.083 %) nebulizer solution USE 1 UNIT DOSE EVERY 4-6 HOURS AS NEEDED FOR WHEEZING .   3/2/2024    albuterol 90 mcg/actuation inhaler Inhale 2 puffs by mouth up to every 4 hours as needed for shortness of breath, wheezing, or prior to exercise.   3/2/2024    amLODIPine (Norvasc) 5 mg tablet Take 1 tablet (5 mg) by mouth once daily.   3/2/2024    aspirin 81 mg chewable tablet Chew 1 tablet (81 mg) once daily.   3/2/2024    atorvastatin (Lipitor) 40 mg tablet Take 1 tablet (40 mg) by mouth once daily at bedtime.   3/2/2024    cholecalciferol (Vitamin D-3) 25 MCG (1000 UT) tablet TAKE AS DIRECTED.   3/2/2024    clopidogrel (Plavix) 75 mg tablet Take 1 tablet (75 mg) by mouth once daily.   3/2/2024    furosemide (Lasix) 20 mg tablet Take 1 tablet (20 mg) by mouth once daily.   3/2/2024    gabapentin (Neurontin) 300 mg capsule take 2-4 capsules BY MOUTH THREE TIMES DAILY AS DIRECTED 1080 capsule 3 3/2/2024    lisinopril 5 mg tablet Take 1 tablet (5 mg) by mouth once daily.   3/2/2024     methadone (Dolophine) 5 mg tablet Take 1 tablet (5 mg) by mouth every 6 hours.   3/2/2024    methadone (Dolophine) 5 mg tablet Take 1 tablet (5 mg) by mouth every 6 hours if needed for severe pain (7 - 10). Do not start before February 11, 2024. 120 tablet 0     metoprolol succinate XL (Toprol-XL) 50 mg 24 hr tablet TAKE 1 TABLET BY MOUTH DAILY IN THE EVENING 90 tablet 3 3/2/2024    naloxone (Narcan) 4 mg/0.1 mL nasal spray USE 1 SPRAY IN ONE NOSTRILONCE AS NEEDED FOR OVERDOSE SYMPTOMS. MAY REPEAT DOSE IF NEEDED IN 2-3 MINUTES IN YOUR OTHER NOSTRIL.   Unknown    nitroglycerin (Nitrostat) 0.4 mg SL tablet DISSOLVE 1 TABLET UNDER THE TONGUE AS NEEDED FOR CHEST PAIN- MAY REPEA   3/2/2024    pantoprazole (ProtoNix) 40 mg EC tablet TAKE ONE TABLET BY MOUTH DAILY 90 tablet 3 3/2/2024     Morphine and Simvastatin  Social History     Tobacco Use    Smoking status: Every Day     Packs/day: .2     Types: Cigarettes    Smokeless tobacco: Never   Substance Use Topics    Alcohol use: Not Currently    Drug use: Never     Family History   Problem Relation Name Age of Onset    Alzheimer's disease Mother  74    Other (old age) Father  81          Review of Systems:  14 point review of systems was completed and negative except for those specially mentioned in this note    Physical Exam:    Heart Rate:  [72-94]   Temp:  [36.3 °C (97.3 °F)-36.6 °C (97.9 °F)]   Resp:  [18-27]   BP: (102)/(58)   SpO2:  [97 %-100 %]     Physical Exam  Vitals reviewed.   Constitutional:       Appearance: He is ill-appearing.   HENT:      Head: Normocephalic and atraumatic.      Right Ear: External ear normal.      Left Ear: External ear normal.      Nose: Nose normal.      Mouth/Throat:      Mouth: Mucous membranes are moist.   Eyes:      Pupils: Pupils are equal, round, and reactive to light.   Cardiovascular:      Rate and Rhythm: Normal rate and regular rhythm.   Pulmonary:      Comments: Diminished  Abdominal:      General: Abdomen is flat. Bowel  sounds are normal.      Palpations: Abdomen is soft.   Genitourinary:     Penis: Normal.    Musculoskeletal:         General: Swelling present.      Cervical back: Normal range of motion.      Left lower leg: Edema present.      Comments: Right AKA   Skin:     General: Skin is warm.      Capillary Refill: Capillary refill takes less than 2 seconds.   Neurological:      General: No focal deficit present.      Comments: No focal deficits identified. Patient intubated and sedated.          Objective:    I have reviewed all medications, laboratory results, and imaging pertinent for today's encounter    Patient resting in bed with eyes opening to voice. Patient unable to follow commands but extremities spontaneously and localizes.     Assessment/Plan:    I am currently managing this critically ill patient for the following problems:    Neuro/Psych/Pain Ctrl/Sedation:  Acute encephalopathy 2/2 CO2 narcosis   History of chronic pain on outpatient methadone   Continue propofol and fentanyl until EGD completed. Will stop later today to perform complete neuro assessment.  Plan to use precedex to assist with SBT  Continue to hold neurontin     Respiratory/ENT:  Acute hypercapnic respiratory failure 2/2 COPD the exacerbation in the setting of RSV infection   Acute COPD exacerbation  Acute hypercapnia  Patient intubated and tolerating well, continue oral hygiene and suction   Monitor ABG daily and with vent setting changes  Continue Solu-medrol Q12  Continue Duonebs scheduled Q4 and PRN    Cardiovascular:  Hypotension Resolved  Abdominal AAA with intramural thrombus   Concern for left leg ischemia with findings of left iliac/femoral occlusion   Hx CAD/PVD  Hx of HTN/HLD  Hx Systolic congestive heart failure (EF 30%)  Hx Ischemic cardiopathy s/p ICD placement     Levo discontinued, Patient maintaining MAP >65  Hold home aspirin/plavix with GI bleed  Hold home lasix, statin, norvasc, lisinopril, metoprolol with GI  bleed/hypotension.    GI:  GI Bleed  H/H stable today 10.2/29.1  4 units of PRBCs and 4 units of FFP yesterday  Continue protonix  EES added per GI  EGD yesterday showed  Severe, generalized friable and hemorrhagic mucosa in the fundus of the stomach;  There was a large amount of old blood and clots in the stomach that partially obscured the views of the fundus and greater curvature.  There was linear trauma in the mid esophagus (likely from OGT trauma) that was oozing.  This stopped oozing with injection of Epi.  There was a significant amount of old blood and clots in the oropharynx, and bruising of the palate.  Repeat EGD today  Hold all anticoagulation     Renal/Volume Status (Intra & Extravascular):  KRYSTLE, resolved  Hyperkalemia, resolved  Bun/Creat 40/0.8  K 5.1  Daily BMP  Urinary catheter for acute retention    Endocrine  Glucose 128  Daily BMP    Heme/Onc:  Hold Heparin ggt and DAPT - Protamine given for heparin reversal   Hgb this afternoon    Infectious Disease:  Continue Ancef x3 days for emergent line placement  Discontinue lines as possible    OBGYN/MSK:  Maintain mosley for retention    Ethics/Code Status:  Full Code    :  DVT Prophylaxis: Hold all anticoagulation for GI bleed  GI Prophylaxis: Protonix  Bowel Regimen: none  Diet: NPO  CVC: NAYELI Price: Right brachial   Mosley: In place/retention  Restraints: Yes  Dispo: ICU    Critical Care Time:  30 minutes    Asim Rodrigues

## 2024-03-07 NOTE — PROGRESS NOTES
"Luis Peacock Jr. is a 73 y.o. male on day 4 of admission presenting with COPD (chronic obstructive pulmonary disease) (CMS/[facility]).  GI is following for GIB     Subjective   According to bedside nurse, patient had some dark red bloody BM last night however no BMs over the last 8 hours.  Vasopressors have been discontinued.  O2 down to 30%.  Planning to wean off ventilator however patient neurostatus is of concern, currently not opening eyes or following commands.  P no vomiting of blood.  Hgb 8.5->10.2 with 4 uPRBCs, 4FFP and 1 unit plts.  On multiple pressors. remains intubated.   Objective   Constitutional:       Appearance: He is ill-appearing.   HENT:      Head: Normocephalic and atraumatic.      Right Ear: External ear normal.      Left Ear: External ear normal.      Nose: Nose normal.      Mouth/Throat:      Mouth: Mucous membranes are moist.   Eyes:      Pupils: Pupils are equal, round, and reactive to light.      Comments: Sluggish reaction to light   Cardiovascular:      Rate and Rhythm: Normal rate and regular rhythm.   Pulmonary:      Comments: Diminished  Abdominal:      General: Abdomen is flat. Bowel sounds are normal.      Palpations: Abdomen is soft.   Genitourinary:     Penis: Normal.    Musculoskeletal:         General: Swelling present.      Cervical back: Normal range of motion.      Left lower leg: Edema present.      Comments: Right AKA   Skin:     General: Skin is warm.      Capillary Refill: Capillary refill takes less than 2 seconds.   Neurological:      sedated?    Last Recorded Vitals  Blood pressure 102/58, pulse 76, temperature 36.3 °C (97.4 °F), temperature source Temporal, resp. rate (!) 28, height 1.626 m (5' 4\"), weight (!) 44.5 kg (98 lb 1.7 oz), SpO2 98 %.  Intake/Output last 3 Shifts:  I/O last 3 completed shifts:  In: 2126.6 (47.8 mL/kg) [I.V.:1026.6 (23.1 mL/kg); Blood:800; IV Piggyback:300]  Out: 1925 (43.3 mL/kg) [Urine:1925 (1.2 mL/kg/hr)]  Weight: 44.5 kg     Relevant " Results    EGD    Result Date: 3/6/2024  Table formatting from the original result was not included. Impression The cricopharynx, upper third of the esophagus, middle third of the esophagus, lower third of the esophagus and GE junction appeared normal. 4 large angioectasias measuring from 4 mm up to 6 mm in the cardia and fundus of the stomach; bleeding was observed; tissue was completely ablated with argon plasma coagulation The duodenal bulb and 2nd part of the duodenum appeared normal. Findings The cricopharynx, upper third of the esophagus, middle third of the esophagus, lower third of the esophagus and GE junction appeared normal. 4 large angioectasias measuring from 4 mm up to 6 mm in the cardia and fundus of the stomach; bleeding was observed; 7 lesions were completely ablated with argon plasma coagulation using a straight fire probe (30 W and 1.5 mL/min flow rate) The duodenal bulb and 2nd part of the duodenum appeared normal. Recommendation  Schedule repeat EGD  Pantoprazole 40 mg IV bid Ok to start clear liquid  diet  Indication Gastrointestinal hemorrhage, unspecified gastrointestinal hemorrhage type Staff Staff Role Enrique Delacruz MD Proceduralist Medications See Anesthesia Record. Preprocedure A history and physical has been performed, and patient medication allergies have been reviewed. The patient's tolerance of previous anesthesia has been reviewed. The risks and benefits of the procedure and the sedation options and risks were discussed with the patient. All questions were answered and informed consent obtained. Details of the Procedure The patient underwent general anesthesia, which was administered by an anesthesia professional. The patient's blood pressure, ECG, ETCO2, heart rate, level of consciousness, respirations and oxygen were monitored throughout the procedure. The scope was introduced through the mouth and advanced to the second part of the duodenum. Retroflexion was performed in the  cardia. Prior to the procedure, the patient's H. Pylori status was unknown. The patient experienced no blood loss. The procedure was not difficult. The patient tolerated the procedure well. There were no apparent adverse events. Events Procedure Events Event Event Time ENDO SCOPE IN TIME 3/6/2024  2:30 PM ENDO SCOPE OUT TIME 3/6/2024  2:39 PM Specimens No specimens collected Procedure Location [facility] [address] [facility] [id number] [phone] Referring Provider No referring provider defined for this encounter. Procedure Provider No name on file     EGD    Result Date: 3/5/2024  Table formatting from the original result was not included. Impression The duodenum appeared normal. Severe friable and hemorrhagic mucosa in the fundus of the stomach Findings The duodenum appeared normal. Severe, generalized friable and hemorrhagic mucosa in the fundus of the stomach; There was a large amount of old blood and clots in the stomach that partially obscured the views of the fundus and greater curvature. There was linear trauma in the mid esophagus (likely from OGT trauma) that was oozing.  This stopped oozing with injection of Epi. There was a significant amount of old blood and clots in the oropharynx, and bruising of the palate. Recommendation  Follow up with PCP Further recommendations per GI consult team Would continue to hold anticoagulation PPI bid  Indication Gastrointestinal hemorrhage, unspecified gastrointestinal hemorrhage type Staff Staff Role Yannick Kaye MD Proceduralist Medications See Anesthesia Record. Preprocedure A history and physical has been performed, and patient medication allergies have been reviewed. The patient's tolerance of previous anesthesia has been reviewed. The risks and benefits of the procedure and the sedation options and risks were discussed with the  ICU attending- two physicians determined that the procedure was medically  necessary . All questions were answered and informed consent obtained. Details of the Procedure The patient underwent monitored anesthesia care, which was administered by an anesthesia professional. The patient's blood pressure, ECG, ETCO2, heart rate, level of consciousness, oxygen and respirations were monitored throughout the procedure. The scope was introduced through the mouth and advanced to the second part of the duodenum. Retroflexion was performed in the cardia. The patient's estimated blood loss was minimal (<5 mL). The procedure was moderately difficult due to difficult intubation and old blood and clots. The patient tolerated the procedure well. There were no apparent adverse events. Events Procedure Events Event Event Time ENDO SCOPE IN TIME 3/5/2024  4:01 PM ENDO SCOPE OUT TIME 3/5/2024  4:25 PM Specimens No specimens collected Procedure Location [facility] Medical Intensive Care [address] [place] [id number] [phone] Referring Provider No referring provider defined for this encounter. Procedure Provider No name on file     XR chest 1 view    Result Date: 3/5/2024  Interpreted By:  Favian Christopher, STUDY: XR CHEST 1 VIEW;  3/5/2024 10:09 am   INDICATION: Signs/Symptoms:ett placement, central line placement, OG placement.   COMPARISON: Portable chest 3 March 2024; CT chest 4 March 2024   ACCESSION NUMBER(S): [id number]   ORDERING CLINICIAN: KARMEN MCDONALD   TECHNIQUE: Single frontal view of the chest; Portable technique   FINDINGS: New endotracheal tube tip projects approximately 8-9 cm above the milan   New enteric tube tip is at the gastroesophageal junction   New right IJ approach central line tip overlies upper SVC   No pneumothorax or any other interval change otherwise   The lungs are emphysematous but clear of acute disease       No pneumothorax with new well-positioned right IJ approach central line   Tip of enteric tube is at the gastroesophageal  junction, likely needs advancement by several cm for optimal positioning and stability   New endotracheal tube tip projects 8-9 cm above the milan   MACRO: None   Signed by: Favian Christopher 3/5/2024 10:14 AM Dictation workstation:   [id number]    CT angio chest abdomen pelvis    Result Date: 3/4/2024  Interpreted By:  Schoenberger, Joseph, STUDY: CT ANGIO CHEST ABDOMEN PELVIS; ;  3/4/2024 12:28 pm   INDICATION: Signs/Symptoms:infrarenal AAA`.   COMPARISON: None.   ACCESSION NUMBER(S): [id number]   ORDERING CLINICIAN: CIELO CLOUD   TECHNIQUE: CT examination was obtained from the thoracic inlet to the lesser trochanters before intravenous contrast and during the systemic arterial phase of intravenous contrast injection. 100 cc Omnipaque 350 were injected for the exam. Sagittal axial and coronal reformatted images were obtained. 3D volume rendered and MIP reconstructions of the contrast-enhanced images for CT angiogram protocol. Additionally, delayed images through the abdomen and pelvis were obtained.   FINDINGS: The thoracic aorta is normal in course and caliber with moderate atherosclerotic calcifications. Pulmonary arteries appear normal in caliber. There are moderate coronary atherosclerotic calcifications. There is no mural thickening of the thoracic aorta. There is infrarenal abdominal aortic aneurysm. It measures 3.7 cm in greatest diameter. It does have mural thrombus with some heterogeneous attenuation and there are a few areas of disruption of the rim calcification but there is no large adjacent hematoma. There are considerable atherosclerotic calcifications in both common and external iliac vessels.   The there is normal opacification of the lumen of the thoracic aorta. There is no evidence for dissection. The visualized segments of the great vessel appear patent with no stenotic disease and mild atherosclerotic plaque.   The pulmonary arteries opacify normally within the limits of this exam. There is no  filling defect in the main or lobar segmental pulmonary arteries to suggest a pulmonary embolus.   There is an infrarenal abdominal aortic aneurysm. There is circumferential mural thrombus about the aneurysm. There is no abnormal enhancement within the mural thrombus. No extravasation of contrast or adjacent hematoma is seen. The aneurysm extends to the aortic bifurcation but does not involve either common iliac artery. There is a outpatient from the right posterolateral wall of the aneurysm at the level of the neck below the level of the renal vessels. It measures approximately 1.5 cm. It is at the level of disrupted mural calcifications in the aneurysm. It is well-defined no adjacent hematoma but does not opacify on the arterial phase or delayed images.   The celiac trunk is patent. The there is atherosclerotic plaque in likely stenotic disease in its splenic artery branch. The a patent artery is replaced to the superior mesenteric artery with some atherosclerotic disease. The superior mesenteric artery is otherwise patent. The inferior mesenteric artery proximally is not seen. Single bilateral renal arteries appear patent.   The right common and external iliac arteries are occluded on arterial phase exam. On delayed phase images, there does appear to be opacification of the external iliac artery likely due to internal iliac branches reconstituting   There is extensive atherosclerotic plaque in the left common iliac artery. There is severe stenosis at the bifurcation resulting in severe stenosis of the proximal external iliac artery which is small diseased vessel which apparent contiguous but severely stenotic disease along its course.   There is no pathologic enlargement of thoracic lymph nodes. There is severe findings of centrilobular emphysema. Chest wall soft tissue unremarkable other than an implanted ICD/pacemaker. There is no aggressive osteolytic or osteoblastic lesion in the thoracic spine or bony  structures of the thorax. There are some degenerative changes.   The liver and spleen and adrenal glands and pancreas and kidneys are grossly unremarkable. There is no distention or mural thickening of bowel loops. There is no hydroureteral nephrosis or ureter stone. The prostate is markedly enlarged. There appear to be large bilateral hydroceles in the visualized parts of the scrotum.   No mass or adenopathy or focal fluid collection is noted in the abdomen or pelvis.   Abdominal wall structures appear grossly intact. There are degenerative changes in the lumbar spine. No aggressive osteolytic or osteoblastic lesion is apparent.       No evidence of acute pulmonary embolus or thoracic aortic dissection or thoracic stenotic disease. There is significant coronary atherosclerosis elbow exam parameters are not optimized to assess coronary vessels.   There is no abdominal aortic aneurysm. There is considerable mural thrombus. Near the top of the aneurysm below the level of the renal vessels there is a small right posterolateral outpouching which appears thrombosis on both delayed and arterial phase image.   There is occlusion of the right common iliac artery. There is severe stenotic disease of the left external iliac artery.   Extensive findings of centrilobular emphysema.   See detailed discussion above.     MACRO: None   Signed by: Joseph Schoenberger 3/4/2024 1:11 PM Dictation workstation:   [id number]    CT abdomen pelvis wo IV contrast    Result Date: 3/4/2024  Interpreted By:  Favian Christopher, STUDY: CT ABDOMEN PELVIS WO IV CONTRAST;  3/3/2024 1:01 pm   INDICATION: Signs/Symptoms:Abdominal pain.   COMPARISON: None.   ACCESSION NUMBER(S): [id number]   ORDERING CLINICIAN: JOSE DUARTE   TECHNIQUE: CT of the abdomen and pelvis from the lung bases through the symphysis pubis without oral or IV contrast   FINDINGS: LOWER CHEST: Emphysema. No large airways wall thickening. Pattern of distal airways tiny  nodules suggesting some element of distal small airways infectious or inflammatory disease such as bronchiolitis   BONES: No acute skeletal findings.   LIVER: Normal. No enlargement or evidence of cirrhosis or fatty change. No gross evidence of a mass, noting low sensitivity and specificity without IV contrast.   SPLEEN: Normal. No enlargement.   PANCREAS: Normal. No CT evidence of acute or chronic pancreatitis. No obvious  duct dilation. No gross evidence of a mass, noting low sensitivity and specificity without IV contrast.   GALLBLADDER: Normal CT appearance. No dilation, calcified, or gas-containing stones.  Other types of gallstones could be occult on CT and detectable only by ultrasound.   BILE DUCTS: Normal. No biliary duct dilation.   ADRENAL GLANDS: Normal. No nodule or mass.   KIDNEYS AND URETERS: Normal.  No hydronephrosis on either side.  No radiodense stone.  Radiolucent stones could be occult on CT.  No gross evidence of a solid mass, noting low sensitivity and specificity without IV contrast.   LYMPH NODES: No adenopathy, intraperitoneal, retroperitoneal, pelvic, inguinal or otherwise.   APPENDIX: Normal.  Not dilated, thick walled or in any other way inflamed in appearance.  No inflammatory change about the appendix.   COLON: Normal. No sign of acute diverticulitis or other colitis. No annular constricting mass.   SMALL BOWEL: Normal. No small bowel dilation or any other sign of small bowel obstruction.   STOMACH / DUODENUM: Grossly normal by CT which has limited sensitivity and specificity for the stomach and duodenum.   RETROPERITONEUM: Normal.  No acute hemorrhage or inflammatory change. Lymph nodes in a separate dedicated section.   OMENTUM, MESENTERY AND PERITONEAL SPACES: Free intraperitoneal air: Negative Free intraperitoneal fluid: Negative Abscess: Negative Other: n/a   URINARY BLADDER: Normal. No wall thickening, large diverticula, radiodense stone or surrounding inflammatory change.    PELVIS: Mild enlargement of the prostate. No pelvic mass, adenopathy or free fluid.   VASCULATURE: Infrarenal abdominal aortic aneurysm measures 39 mm transverse (coronal 40), 39 mm anteroposterior (sagittal 59)   ABDOMINAL WALL: Hernia: Negative Other: As I have annotated with a [place] on sagittal image 83 and axial series 201, image 71, there is an ellipsoid, 4.5 cm craniocaudal, 1.5 cm anteroposterior, 4.5 cm transverse probable hematoma in the subcutaneous fat superficial to and outside the left rectus. No large skin defect, subcutaneous gas or radiopaque foreign body. There is overlying skin thickening. Was this and injection site?       Mid infrarenal abdominal aortic aneurysm measures 39 x 39 mm, involves the infrarenal aorta from 7 cm proximal to its bifurcation all the way to the bifurcation. There was no abdominal aortic aneurysm on CT abdomen and pelvis 25 July 2012, the most recent pertinent comparison exam. There is definitely mural thrombus within the abdominal aortic aneurysm on today's exam, some of which may be recent, although the attenuation does not suggest hyperacute blood. There are no acute blood products or inflammatory changes around the abdominal aortic aneurysm. Follow-up three-phase CTA without and with contrast in arterial and venous phases recommended for further evaluation of the abdominal aortic aneurysm. It is for this finding I have activated the Yellow Alert associated with this exam   4.5 x 1.5 x 4.5 cm probable hematoma in the subcutaneous fat superficial to and separate from the left rectus bundle, left paramidline ventral abdominal wall, which I have annotated with a [place] on axial image 71 and sagittal image 83. Was this a subcutaneous injection site?   No other acute/subacute hemorrhage/hematoma anywhere else in the abdominal wall and none at all in the abdomen or pelvis proper   No acute appendicitis, diverticulitis, other colitis, bowel obstruction, perforation, abscess or  free fluid   No acute pancreatitis, hydronephrosis/urinary stone or any other acute solid organ findings   No gallbladder or biliary duct dilation   MACRO: Critical Finding:  See findings. Notification was initiated on 3/4/2024 at 8:55 am by  Favian Christopher.  (**-YCF-**) Instructions:   Signed by: Favian Christopher 3/4/2024 8:56 AM Dictation workstation:   [id number]      Lab Results   Component Value Date    WBC 13.2 (H) 03/07/2024    HGB 10.3 (L) 03/07/2024    HCT 30.3 (L) 03/07/2024    MCV 94 03/07/2024    PLT 71 (L) 03/07/2024     Lab Results   Component Value Date    GLUCOSE 127 (H) 03/07/2024    CALCIUM 9.6 03/07/2024     03/07/2024    K 4.3 03/07/2024    CO2 29 03/07/2024     03/07/2024    BUN 32 (H) 03/07/2024    CREATININE 0.47 (L) 03/07/2024     Lab Results   Component Value Date    ALT 16 03/07/2024    AST 28 03/07/2024    ALKPHOS 39 03/07/2024    BILITOT 0.5 03/07/2024     Lab Results   Component Value Date    INR 1.0 03/05/2024    INR 1.1 03/05/2024    INR 1.0 03/04/2024    PROTIME 11.0 03/05/2024    PROTIME 12.6 03/05/2024    PROTIME 11.6 03/04/2024         Assessment/Plan   The patient is a 73 y.o. male with signficant past medical history of CAD status post PCI-on ASA and Plavix, ischemic cardiomyopathy status post ICD, systolic CHF, PVD status post Rt AKA, HTN, HLD, COPD, who presented with worsening shortness of breath.      Patient was started on heparin drip this admission due to AAA with thrombus.  Patient transferred to the ICU 3/5 for hypotension and multiple episodes of dark red blood per rectum, x4-5.  No vomiting noted.  Patient intubated and started on vasopressors.       EGD 3/5/24:  The duodenum appeared normal.  Severe, generalized friable and hemorrhagic mucosa in the fundus of the stomach;  There was a large amount of old blood and clots in the stomach that partially obscured the views of the fundus and greater curvature.  There was linear trauma in the mid esophagus (likely from  OGT trauma) that was oozing.  This stopped oozing with injection of Epi.  There was a significant amount of old blood and clots in the oropharynx, and bruising of the palate.    EGD 3/6/24:   The cricopharynx, upper third of the esophagus, middle third of the esophagus, lower third of the esophagus and GE junction appeared normal.  4 large angioectasias measuring from 4 mm up to 6 mm in the cardia and fundus of the stomach; bleeding was observed; tissue was completely ablated with argon plasma coagulation  The duodenal bulb and 2nd part of the duodenum appeared normal.      Acute GIB in the setting of anticoagulation- elevated BUN: Cr ratio.  No known history of liver disease/cirrhosis.  No evidence of cirrhosis on CT.  LFTs and albumin WNL.  INR 1.1.  Patient does have thrombocytopenia with platelets 80s.  History of EtOH abuse 20+ years, quit 10-12 years ago.  Of note, there is also a possible 4.5 x 1.5 x 4.5 cm hematoma in the subcutaneous fat in the ventral abdominal wall on CT.  2.   Macrocytic anemia- Hemoglobin dropped in 1 day from 13.7->8.5 today. Massive blood transfusion protocol initiated with 4 units PRBCs. 4 FFP, and 1 unit plts.   3.   History of CAD s/p PCI-on ASA and Plavix, last dose 3/4 AM.  Currently held.    4.  AAA with thrombus-started on heparin drip this admission.  Heparin drip held 3/5 about 7 AM per RN when rectal bleeding noted.  5.   Large angioectasias with bleeding X4 cardia and fundus of the stomach.  S/p APC.    Plan  -No hematemesis, no melena today.  Hemoglobin stable 10.3.  -Continue PPI twice daily  -trend hgb and hct and transfuse PRBC for Hgb less than 8.0  -monitor consistency and color of stool. Monitor for signs of overt GI bleeding  -avoid NSAIDs  -Continue supportive care per critical care team  -Okay to start clear liquid  -Critical care team/medicine team need to evaluate risk versus benefits of anticoagulant therapy.    I spent 25 minutes in the professional and  overall care of this patient.      Salima Reyes, APRN-CNP

## 2024-03-07 NOTE — PROGRESS NOTES
[facility]       Date:  3/7/2024  Patient:  Luis Peacock Jr.  YOB: 1950  MRN:  [id number]   Admit Date:  3/3/2024  ========================================================================================================    Chief Complaint   Patient presents with    Shortness of Breath     80% in RA, hx of CPOD, smoker, HF. Squad gave 2g Mg, 125 solumedrol and 3 duonebs         History of Present Illness:  Luis Peacock Jr. is a 73 y.o. male, from home with a past medical history of CAD status post PCI, ischemic cardiomyopathy status post ICD, systolic CHF, PVD status post Rt AKA, HTN, HLD, COPD, who presented with worsening shortness of breath after he woke up. He tried to use his home breathing treatment but no improvement. Called EMS and o their arrival he was saturating 80% on RA. They gave him an albuterol which did not improve him so they placed him on 3 DuoNeb treatments, given 125 of IV Solu-Medrol and 2 g of IV magnesium. Pt also is complaining of abdominal pain, and chest discomfort and is asking for relief of his distress. Pt denies LOC, bleeding, nausea, vomiting, diarrhea, constipation, urinary symptoms. Patient is from home and ambulates with crutches and wheelchair, lives with his family       Interval ICU Events:  3/4/2024: Patient transferred to the ICU for worsening hypercapnia and somnolence.     3/5: Mental status and agitation improved on BiPap and Precedex.  Remains with hypercapnea on ABG.  Early this AM noted to have melanotic stool and blood in mosley.  Heparin paused.  HGB down 3 grams on labs. 1U PRBCs ordered, protonix, K cocktail, and GI consulted.  Heparin reversed with protamine    Attending was called to bedside by nursing shortly after seeing the patient this morning on rounds. Patient hypotensive with more large melanotic stool present. He remains encephalopathic. Blood pressure on cuff 60 systolic. Levophed started, massive transfusion protocol  initiated. He had already received protamine and IV Protonix push. GI consult is pending.   Emergent right IJ Cordis placed as well as right radial arterial line (please see separate procedure note). Please note stability of these procedures was not able to be obtained due to the patient's critical condition. Follow-up. Patient was then resuscitated with 4 units of PRBCs and 4 units of FFP with improvement of his blood pressure. He was then intubated using ketamine and rocuronium without incident. The decision was made to resuscitate the patient prior to induction for intubation to prevent cardiac arrest. He tolerated all these procedures well.      EGD showed the following.  The duodenum appeared normal.  Severe, generalized friable and hemorrhagic mucosa in the fundus of the stomach;  There was a large amount of old blood and clots in the stomach that partially obscured the views of the fundus and greater curvature.  There was linear trauma in the mid esophagus (likely from OGT trauma) that was oozing.  This stopped oozing with injection of Epi.  There was a significant amount of old blood and clots in the oropharynx, and bruising of the palate.        3/6: Overnight, patient continued to need suction with removal of a moderate amount of dark red fluid. This morning, patient had a bowel movement that contained bright red blood. No other acute events identified overnight. Patient's levo weaned down and discontinued this morning. Patient tolerating vent well with propofol and fentanyl for sedation and analgesic. Patient scheduled for repeat EGD this afternoon.     3/7 Overnight patient became hypertensive with BP of 196/94. Patient administered IV hydralazine which resolved HTN. No other acute events overnight. Anticoagulation to be restarted tomorrow and aspirin to be restarted on Saturday.     EGD yesterday showed the following;  The cricopharynx, upper third of the esophagus, middle third of the esophagus, lower  third of the esophagus and GE junction appeared normal.  4 large angioectasias measuring from 4 mm up to 6 mm in the cardia and fundus of the stomach; bleeding was observed; 7 lesions were completely ablated with argon plasma coagulation using a straight fire probe (30 W and 1.5 mL/min flow rate)  The duodenal bulb and 2nd part of the duodenum appeared normal.    Medical History:  Past Medical History:   Diagnosis Date    Anesthesia of skin     Numbness    Angina, class IV (CMS/[facility])     CHF (congestive heart failure) (CMS/[facility])     Chronic obstructive pulmonary disease with (acute) exacerbation (CMS/[facility]) 12/08/2022    Acute exacerbation of chronic obstructive pulmonary disease (COPD)    Coronary artery disease     Cough, unspecified     Cough    Disorder of prostate, unspecified     Prostate disease    Encounter for general adult medical examination without abnormal findings 12/09/2021    Encounter for Medicare annual wellness exam    Encounter for immunization 10/16/2020    Encounter for immunization    Functional dyspepsia     Indigestion    Hypertension     Ischemic cardiomyopathy     Local infection of the skin and subcutaneous tissue, unspecified 09/08/2020    Staph skin infection    Mixed hyperlipidemia 12/09/2021    Mixed hyperlipidemia    Muscle weakness (generalized)     Localized muscle weakness    Nicotine dependence, cigarettes, uncomplicated 10/16/2020    Heavy cigarette smoker    Other iron deficiency anemias 10/11/2021    Hypochromic erythrocytes    Other spondylosis, cervical region 03/16/2022    Other spondylosis, cervical region    Pain in leg, unspecified 03/16/2022    Leg pain    Personal history of other diseases of the circulatory system     History of cardiac disorder    Personal history of other diseases of the circulatory system     History of hypertension    Personal history of other diseases of the circulatory system 12/09/2021    History of class IV angina pectoris    Personal history of  other diseases of the circulatory system 03/16/2022    History of vascular disorder    Personal history of other diseases of the musculoskeletal system and connective tissue     History of arthritis    Personal history of other diseases of the musculoskeletal system and connective tissue 03/16/2022    History of low back pain    Personal history of other diseases of the musculoskeletal system and connective tissue 03/16/2022    History of neck pain    Personal history of other specified conditions     History of nausea    Personal history of other specified conditions     History of chest pain    Personal history of other specified conditions     History of heartburn    Postnasal drip     Post-nasal drip    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 AKA on right side    CORONARY ANGIOPLASTY WITH STENT PLACEMENT  04/27/2017    Cath Placement Of Stent 1    OTHER SURGICAL HISTORY  11/09/2021    Tooth extraction    OTHER SURGICAL HISTORY  11/09/2021    Leg surgery    OTHER SURGICAL HISTORY  11/09/2021    Cardioverter defibrillator insertion    OTHER SURGICAL HISTORY  11/09/2021    Amputation Of Leg Above Knee    OTHER SURGICAL HISTORY  04/03/2019    Cardiac catheterization with stent placement     Medications Prior to Admission   Medication Sig Dispense Refill Last Dose    albuterol 2.5 mg /3 mL (0.083 %) nebulizer solution USE 1 UNIT DOSE EVERY 4-6 HOURS AS NEEDED FOR WHEEZING .   3/2/2024    albuterol 90 mcg/actuation inhaler Inhale 2 puffs by mouth up to every 4 hours as needed for shortness of breath, wheezing, or prior to exercise.   3/2/2024    amLODIPine (Norvasc) 5 mg  tablet Take 1 tablet (5 mg) by mouth once daily.   3/2/2024    aspirin 81 mg chewable tablet Chew 1 tablet (81 mg) once daily.   3/2/2024    atorvastatin (Lipitor) 40 mg tablet Take 1 tablet (40 mg) by mouth once daily at bedtime.   3/2/2024    cholecalciferol (Vitamin D-3) 25 MCG (1000 UT) tablet TAKE AS DIRECTED.   3/2/2024    clopidogrel (Plavix) 75 mg tablet Take 1 tablet (75 mg) by mouth once daily.   3/2/2024    furosemide (Lasix) 20 mg tablet Take 1 tablet (20 mg) by mouth once daily.   3/2/2024    gabapentin (Neurontin) 300 mg capsule take 2-4 capsules BY MOUTH THREE TIMES DAILY AS DIRECTED 1080 capsule 3 3/2/2024    lisinopril 5 mg tablet Take 1 tablet (5 mg) by mouth once daily.   3/2/2024    methadone (Dolophine) 5 mg tablet Take 1 tablet (5 mg) by mouth every 6 hours.   3/2/2024    methadone (Dolophine) 5 mg tablet Take 1 tablet (5 mg) by mouth every 6 hours if needed for severe pain (7 - 10). Do not start before February 11, 2024. 120 tablet 0     metoprolol succinate XL (Toprol-XL) 50 mg 24 hr tablet TAKE 1 TABLET BY MOUTH DAILY IN THE EVENING 90 tablet 3 3/2/2024    naloxone (Narcan) 4 mg/0.1 mL nasal spray USE 1 SPRAY IN ONE NOSTRILONCE AS NEEDED FOR OVERDOSE SYMPTOMS. MAY REPEAT DOSE IF NEEDED IN 2-3 MINUTES IN YOUR OTHER NOSTRIL.   Unknown    nitroglycerin (Nitrostat) 0.4 mg SL tablet DISSOLVE 1 TABLET UNDER THE TONGUE AS NEEDED FOR CHEST PAIN- MAY REPEA   3/2/2024    pantoprazole (ProtoNix) 40 mg EC tablet TAKE ONE TABLET BY MOUTH DAILY 90 tablet 3 3/2/2024     Morphine and Simvastatin  Social History     Tobacco Use    Smoking status: Every Day     Packs/day: .2     Types: Cigarettes    Smokeless tobacco: Never   Substance Use Topics    Alcohol use: Not Currently    Drug use: Never     Family History   Problem Relation Name Age of Onset    Alzheimer's disease Mother  74    Other (old age) Father  81          Review of Systems:  14 point review of systems was completed and negative except for  those specially mentioned in this note    Physical Exam:    Heart Rate:  []   Temp:  [36.2 °C (97.2 °F)-36.5 °C (97.7 °F)]   Resp:  [17-27]   Weight:  [44.5 kg (98 lb 1.7 oz)]   SpO2:  [63 %-100 %]     Physical Exam  Vitals reviewed.   Constitutional:       Appearance: He is ill-appearing.   HENT:      Head: Normocephalic and atraumatic.      Right Ear: External ear normal.      Left Ear: External ear normal.      Nose: Nose normal.      Mouth/Throat:      Mouth: Mucous membranes are moist.   Eyes:      Pupils: Pupils are equal, round, and reactive to light.      Comments: Sluggish reaction to light   Cardiovascular:      Rate and Rhythm: Normal rate and regular rhythm.   Pulmonary:      Comments: Diminished  Abdominal:      General: Abdomen is flat. Bowel sounds are normal.      Palpations: Abdomen is soft.   Genitourinary:     Penis: Normal.    Musculoskeletal:         General: Swelling present.      Cervical back: Normal range of motion.      Left lower leg: Edema present.      Comments: Right AKA   Skin:     General: Skin is warm.      Capillary Refill: Capillary refill takes less than 2 seconds.   Neurological:      General: No focal deficit present.      Motor: Weakness present.      Comments: No focal deficits identified. Patient intubated and sedated.   Pt response delayed to command          Objective:    I have reviewed all medications, laboratory results, and imaging pertinent for today's encounter    Patient resting in bed with eyes opening to voice. Patient able to follow simple commands with extremities moving spontaneously and localizes.     Assessment/Plan:    I am currently managing this critically ill patient for the following problems:    Neuro/Psych/Pain Ctrl/Sedation:  Acute encephalopathy 2/2 CO2 narcosis   History of chronic pain on outpatient methadone   Continue weaning fentanyl for neuro assessment and breathing trial today  Titrate precedex as needed for patient comfort  Continue to  hold neurontin   Daily SAT    Respiratory/ENT:  Acute hypercapnic respiratory failure 2/2 COPD the exacerbation in the setting of RSV infection   Acute COPD exacerbation  Acute hypercapnia  Patient intubated and tolerating well, continue oral hygiene and suction PRN   Monitor ABG daily and with vent setting changes  Continue Solu-medrol Q12  Continue Duonebs scheduled and PRN  Daily SBT    Cardiovascular:  Hypotension Resolved  Abdominal AAA with intramural thrombus   Concern for left leg ischemia with findings of left iliac/femoral occlusion  HTN   Hx CAD/PVD  Hx of HTN/HLD  Hx Systolic congestive heart failure (EF 30%)  Hx Ischemic cardiopathy s/p ICD placement     Vascular consulted. Appreciate recommendations   Levo discontinued, Patient maintaining MAP >65  Continue to old home aspirin/plavix with GI bleed, restart Aspirin Saturday  Ok to restart patient on heparin tomorrow for anticoagulation per GI   Hold home lasix, statin, norvasc, lisinopril, metoprolol with GI bleed/hypotension.  HTN controled with PRN hydralazine     GI:  GI Bleed  H/H stable today 10.3/30.3  4 units of PRBCs and 4 units of FFP on Tuesday  Continue protonix  GI consulted, appreciate recommendations  EES added per GI  EGD yesterday showed  The cricopharynx, upper third of the esophagus, middle third of the esophagus, lower third of the esophagus and GE junction appeared normal.  4 large angioectasias measuring from 4 mm up to 6 mm in the cardia and fundus of the stomach; bleeding was observed; 7 lesions were completely ablated with argon plasma coagulation using a straight fire probe (30 W and 1.5 mL/min flow rate)  The duodenal bulb and 2nd part of the duodenum appeared normal.  Hold all anticoagulation for 48 hrs   Monitor daily CBC  Start tube feed if unable to extubate    Renal/Volume Status (Intra & Extravascular):  KRYSTLE, resolved  Hyperkalemia, resolved  Bun/Creat 32/0.47  K 4.3  Daily BMP  Urinary catheter for acute  retention    Endocrine  Glucose 127  Daily BMP    Heme/Onc:  Restart Heparin tomorrow per GI and vascular  Hgb this afternoon    Infectious Disease:  Continue Ancef x3 days for emergent line placement. Completed 3/8  internal jugular line removed today  [place] left in place    OBGYN/MSK:  Maintain mosley for retention    Ethics/Code Status:  Full Code    :  DVT Prophylaxis: Hold all anticoagulation for GI bleed for 48hr post EGD  GI Prophylaxis: Protonix  Bowel Regimen: none  Diet: NPO  CVC: RIJ discontinued  Dee: Right brachial   Mosley: In place/retention  Restraints: Yes  Dispo: ICU    Spoke with LJ Gardner NP who recommended ATC for concern of limb ischemia. Patient to possibly have angiography next week pending clinical course. Dr Delacruz notified regarding recommendation for ATC. Dr. Delacruz recommends holding ATC for additional 24 hours due to AVMs with cauterization. Will re-evaluate in am and resume ATC as able. Plan for holding DAPT for 24-48 hrs post EGD per GI recs.     Critical Care Time:  30 minutes    Asim WHITEHEADP student

## 2024-03-07 NOTE — CARE PLAN
Problem: Discharge Planning  Goal: Discharge to home or other facility with appropriate resources  Outcome: Progressing     Problem: Safety - Adult  Goal: Free from fall injury  Outcome: Progressing     Problem: Pain - Adult  Goal: Verbalizes/displays adequate comfort level or baseline comfort level  Outcome: Progressing     Problem: Chronic Conditions and Co-morbidities  Goal: Patient's chronic conditions and co-morbidity symptoms are monitored and maintained or improved  Outcome: Progressing     Problem: Skin  Goal: Prevent/manage excess moisture  Outcome: Progressing  Goal: Promote/optimize nutrition  Outcome: Progressing     Problem: Safety - Medical Restraint  Goal: Remains free of injury from restraints (Restraint for Interference with Medical Device)  Outcome: Progressing  Goal: Free from restraint(s) (Restraint for Interference with Medical Device)  Outcome: Progressing     Problem: Physical Restraint  Goal: I will require minimum level of restraint  Outcome: Progressing     Problem: Physical Restraint  Goal: I will require minimum level of restraint  Outcome: Progressing     Problem: Indwelling Catheter Maintenance  Goal: I will have no complications from indwelling catheter  Outcome: Progressing   The patient's goals for the shift include      The clinical goals for the shift include Patient will remain hemodynamically stable off of IV vasopressors for the duration of this shift.         no preference

## 2024-03-07 NOTE — CONSULTS
Nutrition Progress Note    RD consulted for initiate and manage tube feeding. Per RN, no active bleeding noted. RN planning to place NG today. Remains intubated and sedated. No propofol or pressors running at time of RD visit. No family at bedside. Pt at risk for refeeding syndrome. Phosphorus low at 1.9. Potassium WNL at 4.3. Magnesium WNL at 1.80. Secure chat sent to ICU intensivist and SHADY Bess with recommendations to replace phosphorus, add 100 mg thiamine for at least 3 days, and monitor potassium, phosphorus, and magnesium daily.    Recommend initiating Jevity 1.5 at 20 ml/hr, advancing as tolerated every 8-12 hours to goal rate of 40 ml/hr (at goal rate provides 1440 kcal, 61g protein, 730 ml free water). Flush of 50 ml Q 4 hours or per MD.    If potassium, phosphorus, or magnesium drop when advancing TF, do not continue to advance until electrolyte replacement initiated.    Estimated nutritional needs:    Total Energy Estimated Needs (kCal): 1480 kCal  Method for Estimating Needs: 9834-8258 kcal (30-35 kcal/kg)  Total Protein Estimated Needs (g): 68 g  Method for Estimating Needs: 51-85g (1.2-2 g/kg)  Total Fluid Estimated Needs (mL): 1480 mL  Method for Estimating Needs: 1 ml/kcal or per MD

## 2024-03-08 NOTE — CARE PLAN
Problem: Pain - Adult  Goal: Verbalizes/displays adequate comfort level or baseline comfort level  Outcome: Progressing     Problem: Safety - Adult  Goal: Free from fall injury  Outcome: Progressing     Problem: Discharge Planning  Goal: Discharge to home or other facility with appropriate resources  Outcome: Progressing     Problem: Chronic Conditions and Co-morbidities  Goal: Patient's chronic conditions and co-morbidity symptoms are monitored and maintained or improved  Outcome: Progressing     Problem: Skin  Goal: Prevent/manage excess moisture  Outcome: Progressing  Flowsheets (Taken 3/8/2024 1448)  Prevent/manage excess moisture: Cleanse incontinence/protect with barrier cream  Goal: Promote/optimize nutrition  Outcome: Progressing  Flowsheets (Taken 3/8/2024 1448)  Promote/optimize nutrition: Reassess MST if dietician not consulted     Problem: Indwelling Catheter Maintenance  Goal: I will have no complications from indwelling catheter  Outcome: Progressing     Problem: Physical Restraint  Goal: I will require minimum level of restraint  Outcome: Progressing     Problem: Safety - Medical Restraint  Goal: Remains free of injury from restraints (Restraint for Interference with Medical Device)  Outcome: Progressing  Goal: Free from restraint(s) (Restraint for Interference with Medical Device)  Outcome: Progressing

## 2024-03-08 NOTE — SIGNIFICANT EVENT
Patient attempting to sit up in bed, hypertensive in the 220s and tachycardic in the 140-150s. NP at bedside. Fentanyl gtt brought up by pharmacy (was dry so IVP was pulled to give if needed) so restarted. Patient did become calm after sedation and IVP was not needed.

## 2024-03-08 NOTE — PROGRESS NOTES
"Luis Peacock Jr. is a 73 y.o. male on day 5 of admission presenting with COPD (chronic obstructive pulmonary disease) (CMS/Formerly [facility]).    Subjective   No acute events overnight.  Patient is still intubated with 30% FiO2.  Sedated with Precedex and fentanyl. No signs of active GIB.  No further melena.  No hematemesis.  H&H stable.  Hemoglobin 9.6.  Objective   Constitutional:       Appearance: He is ill-appearing.   HENT:      Head: Normocephalic and atraumatic.      Right Ear: External ear normal.      Left Ear: External ear normal.      Nose: Nose normal.      Mouth/Throat:      Mouth: Mucous membranes are moist.   Eyes:      Pupils: Pupils are equal, round, and reactive to light.      Comments: Sluggish reaction to light   Cardiovascular:      Rate and Rhythm: Normal rate and regular rhythm.   Pulmonary:      Comments: Diminished  Abdominal:      General: Abdomen is flat. Bowel sounds are normal.      Palpations: Abdomen is soft.   Genitourinary:     Penis: Normal.    Musculoskeletal:         General: Swelling present.      Cervical back: Normal range of motion.      Left lower leg: Edema present.      Comments: Right AKA   Skin:     General: Skin is warm.      Capillary Refill: Capillary refill takes less than 2 seconds.   Neurological:      sedated    Last Recorded Vitals  Blood pressure 102/58, pulse 76, temperature 35.9 °C (96.6 °F), temperature source Temporal, resp. rate 11, height 1.626 m (5' 4\"), weight (!) 44.5 kg (98 lb 1.7 oz), SpO2 97 %.  Intake/Output last 3 Shifts:  I/O last 3 completed shifts:  In: 1204.4 (27.1 mL/kg) [I.V.:982.4 (22.1 mL/kg); NG/GT:222]  Out: 1355 (30.4 mL/kg) [Urine:1255 (0.8 mL/kg/hr); Emesis/NG output:100]  Weight: 44.5 kg     Relevant Results    EGD    Result Date: 3/6/2024  Table formatting from the original result was not included. Impression The cricopharynx, upper third of the esophagus, middle third of the esophagus, lower third of the esophagus and GE junction appeared normal. " 4 large angioectasias measuring from 4 mm up to 6 mm in the cardia and fundus of the stomach; bleeding was observed; tissue was completely ablated with argon plasma coagulation The duodenal bulb and 2nd part of the duodenum appeared normal. Findings The cricopharynx, upper third of the esophagus, middle third of the esophagus, lower third of the esophagus and GE junction appeared normal. 4 large angioectasias measuring from 4 mm up to 6 mm in the cardia and fundus of the stomach; bleeding was observed; 7 lesions were completely ablated with argon plasma coagulation using a straight fire probe (30 W and 1.5 mL/min flow rate) The duodenal bulb and 2nd part of the duodenum appeared normal. Recommendation  Schedule repeat EGD  Pantoprazole 40 mg IV bid Ok to start clear liquid  diet  Indication Gastrointestinal hemorrhage, unspecified gastrointestinal hemorrhage type Staff Staff Role Enrique Delacruz MD Proceduralist Medications See Anesthesia Record. Preprocedure A history and physical has been performed, and patient medication allergies have been reviewed. The patient's tolerance of previous anesthesia has been reviewed. The risks and benefits of the procedure and the sedation options and risks were discussed with the patient. All questions were answered and informed consent obtained. Details of the Procedure The patient underwent general anesthesia, which was administered by an anesthesia professional. The patient's blood pressure, ECG, ETCO2, heart rate, level of consciousness, respirations and oxygen were monitored throughout the procedure. The scope was introduced through the mouth and advanced to the second part of the duodenum. Retroflexion was performed in the cardia. Prior to the procedure, the patient's H. Pylori status was unknown. The patient experienced no blood loss. The procedure was not difficult. The patient tolerated the procedure well. There were no apparent adverse events. Events Procedure Events Event  Event Time ENDO SCOPE IN TIME 3/6/2024  2:30 PM ENDO SCOPE OUT TIME 3/6/2024  2:39 PM Specimens No specimens collected Procedure Location [facility] [address]  [id number] [phone] Referring Provider No referring provider defined for this encounter. Procedure Provider No name on file     EGD    Result Date: 3/5/2024  Table formatting from the original result was not included. Impression The duodenum appeared normal. Severe friable and hemorrhagic mucosa in the fundus of the stomach Findings The duodenum appeared normal. Severe, generalized friable and hemorrhagic mucosa in the fundus of the stomach; There was a large amount of old blood and clots in the stomach that partially obscured the views of the fundus and greater curvature. There was linear trauma in the mid esophagus (likely from OGT trauma) that was oozing.  This stopped oozing with injection of Epi. There was a significant amount of old blood and clots in the oropharynx, and bruising of the palate. Recommendation  Follow up with PCP Further recommendations per GI consult team Would continue to hold anticoagulation PPI bid  Indication Gastrointestinal hemorrhage, unspecified gastrointestinal hemorrhage type Staff Staff Role Yannick Kaye MD Proceduralist Medications See Anesthesia Record. Preprocedure A history and physical has been performed, and patient medication allergies have been reviewed. The patient's tolerance of previous anesthesia has been reviewed. The risks and benefits of the procedure and the sedation options and risks were discussed with the  ICU attending- two physicians determined that the procedure was medically necessary . All questions were answered and informed consent obtained. Details of the Procedure The patient underwent monitored anesthesia care, which was administered by an anesthesia professional. The patient's blood pressure, ECG, ETCO2, heart rate, level  of consciousness, oxygen and respirations were monitored throughout the procedure. The scope was introduced through the mouth and advanced to the second part of the duodenum. Retroflexion was performed in the cardia. The patient's estimated blood loss was minimal (<5 mL). The procedure was moderately difficult due to difficult intubation and old blood and clots. The patient tolerated the procedure well. There were no apparent adverse events. Events Procedure Events Event Event Time ENDO SCOPE IN TIME 3/5/2024  4:01 PM ENDO SCOPE OUT TIME 3/5/2024  4:25 PM Specimens No specimens collected Procedure Location [facility] [address] [place] [id number] [phone] Referring Provider No referring provider defined for this encounter. Procedure Provider No name on file       Lab Results   Component Value Date    WBC 18.8 (H) 03/08/2024    HGB 9.6 (L) 03/08/2024    HCT 30.0 (L) 03/08/2024    MCV 98 03/08/2024    PLT 78 (L) 03/08/2024     Lab Results   Component Value Date    GLUCOSE 137 (H) 03/08/2024    CALCIUM 9.2 03/08/2024     03/08/2024    K 4.5 03/08/2024    CO2 33 (H) 03/08/2024     03/08/2024    BUN 31 (H) 03/08/2024    CREATININE 0.49 (L) 03/08/2024     Lab Results   Component Value Date    ALT 11 03/08/2024    AST 21 03/08/2024    ALKPHOS 38 03/08/2024    BILITOT 0.5 03/08/2024           Assessment/Plan   The patient is a 73 y.o. male with signficant past medical history of CAD status post PCI-on ASA and Plavix, ischemic cardiomyopathy status post ICD, systolic CHF, PVD status post Rt AKA, HTN, HLD, COPD, who presented with worsening shortness of breath.      Patient was started on heparin drip this admission due to AAA with thrombus.  Patient transferred to the ICU 3/5 for hypotension and multiple episodes of dark red blood per rectum, x4-5.  No vomiting noted.  Patient intubated and started on vasopressors.       EGD 3/5/24:  The duodenum  appeared normal.  Severe, generalized friable and hemorrhagic mucosa in the fundus of the stomach;  There was a large amount of old blood and clots in the stomach that partially obscured the views of the fundus and greater curvature.  There was linear trauma in the mid esophagus (likely from OGT trauma) that was oozing.  This stopped oozing with injection of Epi.  There was a significant amount of old blood and clots in the oropharynx, and bruising of the palate.     EGD 3/6/24:   The cricopharynx, upper third of the esophagus, middle third of the esophagus, lower third of the esophagus and GE junction appeared normal.  4 large angioectasias measuring from 4 mm up to 6 mm in the cardia and fundus of the stomach; bleeding was observed; tissue was completely ablated with argon plasma coagulation  The duodenal bulb and 2nd part of the duodenum appeared normal.       ASSESSMENT     Acute GIB in the setting of anticoagulation- elevated BUN: Cr ratio.  No known history of liver disease/cirrhosis.  No evidence of cirrhosis on CT.  LFTs and albumin WNL.  INR 1.1.  Patient does have thrombocytopenia with platelets 80s.  History of EtOH abuse 20+ years, quit 10-12 years ago.  Of note, there is also a possible 4.5 x 1.5 x 4.5 cm hematoma in the subcutaneous fat in the ventral abdominal wall on CT.  Macrocytic anemia- Hemoglobin dropped in 1 day from 13.7->8.5 today. Massive blood transfusion protocol initiated with 4 units PRBCs. 4 FFP, and 1 unit plts.   History of CAD s/p PCI-on ASA and Plavix, last dose 3/4 AM.  Currently held.    AAA with thrombus-started on heparin drip this admission.  Heparin drip held 3/5 about 7 AM per RN when rectal bleeding noted.  5.   Large angioectasias with bleeding X4 cardia and fundus of the stomach.  S/p APC.     Plan  -No hematemesis, no melena today.  Hemoglobin stable 9.6.  -Continue PPI twice daily  -trend hgb and hct and transfuse PRBC for Hgb less than 8.0  -monitor consistency and  color of stool. Monitor for signs of overt GI bleeding  -avoid NSAIDs  -Continue supportive care per critical care team  -Okay to start enteral feedings  -Critical care team/medicine team need to evaluate risk versus benefits of anticoagulant therapy.  -GI will sign off for now but feel free to call with any questions or concerns..     Discussed case with Dr. Johnson   I spent 25 minutes in the professional and overall care of this patient.       Salima Reyes, MICAELA-CNP

## 2024-03-08 NOTE — PROGRESS NOTES
Attempted SBT yesterday afternoon but pt was unable to tolerate and had apnea. Remains intubated at this time, FiO2 30%.

## 2024-03-08 NOTE — SIGNIFICANT EVENT
Patient noted to have perspiration, breathing around tube, appearing anxious, with tachycardiac, hypertensive, and low spo2- NP ok with placing back on AC. PRN hydralazine given to continue to utilize precedex instead of fentanyl for sedation to help assist patient in following commands with next SBT.

## 2024-03-08 NOTE — PROGRESS NOTES
[facility] Critical Care Medicine       Date:  3/8/2024  Patient:  Luis Peacock Jr.  YOB: 1950  MRN:  [id number]   Admit Date:  3/3/2024  ========================================================================================================    Chief Complaint   Patient presents with    Shortness of Breath     80% in RA, hx of CPOD, smoker, HF. Squad gave 2g Mg, 125 solumedrol and 3 duonebs         History of Present Illness:  Luis Peacock Jr. is a 73 y.o. male, from home with a past medical history of CAD status post PCI, ischemic cardiomyopathy status post ICD, systolic CHF, PVD status post Rt AKA, HTN, HLD, COPD, who presented with worsening shortness of breath after he woke up. He tried to use his home breathing treatment but no improvement. Called EMS and o their arrival he was saturating 80% on RA. They gave him an albuterol which did not improve him so they placed him on 3 DuoNeb treatments, given 125 of IV Solu-Medrol and 2 g of IV magnesium. Pt also is complaining of abdominal pain, and chest discomfort and is asking for relief of his distress. Pt denies LOC, bleeding, nausea, vomiting, diarrhea, constipation, urinary symptoms. Patient is from home and ambulates with crutches and wheelchair, lives with his family       Interval ICU Events:  3/4/2024: Patient transferred to the ICU for worsening hypercapnia and somnolence.   3/5: Mental status and agitation improved on BiPap and Precedex.  Remains with hypercapnea on ABG.  Early this AM noted to have melanotic stool and blood in mosley.  Heparin paused.  HGB down 3 grams on labs. 1U PRBCs ordered, protonix, K cocktail, and GI consulted.  Heparin reversed with protamine    Attending was called to bedside by nursing shortly after seeing the patient this morning on rounds. Patient hypotensive with more large melanotic stool present. He remains encephalopathic. Blood pressure on cuff 60 systolic. Levophed started, massive transfusion protocol  initiated. He had already received protamine and IV Protonix push. GI consult is pending.   Emergent right IJ Cordis placed as well as right radial arterial line (please see separate procedure note). Please note stability of these procedures was not able to be obtained due to the patient's critical condition. Follow-up. Patient was then resuscitated with 4 units of PRBCs and 4 units of FFP with improvement of his blood pressure. He was then intubated using ketamine and rocuronium without incident. The decision was made to resuscitate the patient prior to induction for intubation to prevent cardiac arrest. He tolerated all these procedures well.      EGD showed the following.  The duodenum appeared normal.  Severe, generalized friable and hemorrhagic mucosa in the fundus of the stomach;  There was a large amount of old blood and clots in the stomach that partially obscured the views of the fundus and greater curvature.  There was linear trauma in the mid esophagus (likely from OGT trauma) that was oozing.  This stopped oozing with injection of Epi.  There was a significant amount of old blood and clots in the oropharynx, and bruising of the palate.  3/6: Overnight, patient continued to need suction with removal of a moderate amount of dark red fluid. This morning, patient had a bowel movement that contained bright red blood. No other acute events identified overnight. Patient's levo weaned down and discontinued this morning. Patient tolerating vent well with propofol and fentanyl for sedation and analgesic. Patient scheduled for repeat EGD this afternoon.   3/7 Overnight patient became hypertensive with BP of 196/94. Patient administered IV hydralazine which resolved HTN. No other acute events overnight. Anticoagulation to be restarted tomorrow and aspirin to be restarted on Saturday.     EGD yesterday showed the following;  The cricopharynx, upper third of the esophagus, middle third of the esophagus, lower third of  the esophagus and GE junction appeared normal.  4 large angioectasias measuring from 4 mm up to 6 mm in the cardia and fundus of the stomach; bleeding was observed; 7 lesions were completely ablated with argon plasma coagulation using a straight fire probe (30 W and 1.5 mL/min flow rate)  The duodenal bulb and 2nd part of the duodenum appeared normal.  3/8: No acute events overnight. Intubated with FiO2 30%, PEEP 5. Sedated with precedex and fentanyl. Intermittently follows simple commands. Hypertensive and tachycardic with intervention/ sedation held. VSS, NSR with PAC, HR 60-70s. No s/s of bleeding. Dark/ old bloody drainage with suctioning, H/H stable.     Medical History:  Past Medical History:   Diagnosis Date    Anesthesia of skin     Numbness    Angina, class IV (CMS/[facility])     CHF (congestive heart failure) (CMS/[facility])     Chronic obstructive pulmonary disease with (acute) exacerbation (CMS/[facility]) 12/08/2022    Acute exacerbation of chronic obstructive pulmonary disease (COPD)    Coronary artery disease     Cough, unspecified     Cough    Disorder of prostate, unspecified     Prostate disease    Encounter for general adult medical examination without abnormal findings 12/09/2021    Encounter for Medicare annual wellness exam    Encounter for immunization 10/16/2020    Encounter for immunization    Functional dyspepsia     Indigestion    Hypertension     Ischemic cardiomyopathy     Local infection of the skin and subcutaneous tissue, unspecified 09/08/2020    Staph skin infection    Mixed hyperlipidemia 12/09/2021    Mixed hyperlipidemia    Muscle weakness (generalized)     Localized muscle weakness    Nicotine dependence, cigarettes, uncomplicated 10/16/2020    Heavy cigarette smoker    Other iron deficiency anemias 10/11/2021    Hypochromic erythrocytes    Other spondylosis, cervical region 03/16/2022    Other spondylosis, cervical region    Pain in leg, unspecified 03/16/2022    Leg pain    Personal history of  other diseases of the circulatory system     History of cardiac disorder    Personal history of other diseases of the circulatory system     History of hypertension    Personal history of other diseases of the circulatory system 12/09/2021    History of class IV angina pectoris    Personal history of other diseases of the circulatory system 03/16/2022    History of vascular disorder    Personal history of other diseases of the musculoskeletal system and connective tissue     History of arthritis    Personal history of other diseases of the musculoskeletal system and connective tissue 03/16/2022    History of low back pain    Personal history of other diseases of the musculoskeletal system and connective tissue 03/16/2022    History of neck pain    Personal history of other specified conditions     History of nausea    Personal history of other specified conditions     History of chest pain    Personal history of other specified conditions     History of heartburn    Postnasal drip     Post-nasal drip    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 AKA on right side    CORONARY ANGIOPLASTY WITH STENT PLACEMENT  04/27/2017    Cath Placement Of Stent 1    OTHER SURGICAL HISTORY  11/09/2021    Tooth extraction    OTHER SURGICAL HISTORY  11/09/2021    Leg surgery    OTHER SURGICAL HISTORY  11/09/2021    Cardioverter defibrillator insertion    OTHER SURGICAL HISTORY  11/09/2021    Amputation Of Leg Above Knee    OTHER SURGICAL HISTORY  04/03/2019    Cardiac catheterization with stent placement     Medications Prior to Admission   Medication Sig Dispense Refill Last Dose     albuterol 2.5 mg /3 mL (0.083 %) nebulizer solution USE 1 UNIT DOSE EVERY 4-6 HOURS AS NEEDED FOR WHEEZING .   3/2/2024    albuterol 90 mcg/actuation inhaler Inhale 2 puffs by mouth up to every 4 hours as needed for shortness of breath, wheezing, or prior to exercise.   3/2/2024    amLODIPine (Norvasc) 5 mg tablet Take 1 tablet (5 mg) by mouth once daily.   3/2/2024    aspirin 81 mg chewable tablet Chew 1 tablet (81 mg) once daily.   3/2/2024    atorvastatin (Lipitor) 40 mg tablet Take 1 tablet (40 mg) by mouth once daily at bedtime.   3/2/2024    cholecalciferol (Vitamin D-3) 25 MCG (1000 UT) tablet TAKE AS DIRECTED.   3/2/2024    clopidogrel (Plavix) 75 mg tablet Take 1 tablet (75 mg) by mouth once daily.   3/2/2024    furosemide (Lasix) 20 mg tablet Take 1 tablet (20 mg) by mouth once daily.   3/2/2024    gabapentin (Neurontin) 300 mg capsule take 2-4 capsules BY MOUTH THREE TIMES DAILY AS DIRECTED 1080 capsule 3 3/2/2024    lisinopril 5 mg tablet Take 1 tablet (5 mg) by mouth once daily.   3/2/2024    methadone (Dolophine) 5 mg tablet Take 1 tablet (5 mg) by mouth every 6 hours.   3/2/2024    methadone (Dolophine) 5 mg tablet Take 1 tablet (5 mg) by mouth every 6 hours if needed for severe pain (7 - 10). Do not start before February 11, 2024. 120 tablet 0     metoprolol succinate XL (Toprol-XL) 50 mg 24 hr tablet TAKE 1 TABLET BY MOUTH DAILY IN THE EVENING 90 tablet 3 3/2/2024    naloxone (Narcan) 4 mg/0.1 mL nasal spray USE 1 SPRAY IN ONE NOSTRILONCE AS NEEDED FOR OVERDOSE SYMPTOMS. MAY REPEAT DOSE IF NEEDED IN 2-3 MINUTES IN YOUR OTHER NOSTRIL.   Unknown    nitroglycerin (Nitrostat) 0.4 mg SL tablet DISSOLVE 1 TABLET UNDER THE TONGUE AS NEEDED FOR CHEST PAIN- MAY REPEA   3/2/2024    pantoprazole (ProtoNix) 40 mg EC tablet TAKE ONE TABLET BY MOUTH DAILY 90 tablet 3 3/2/2024     Morphine and Simvastatin  Social History     Tobacco Use    Smoking status: Every Day     Packs/day: .2     Types: Cigarettes     Smokeless tobacco: Never   Substance Use Topics    Alcohol use: Not Currently    Drug use: Never     Family History   Problem Relation Name Age of Onset    Alzheimer's disease Mother  74    Other (old age) Father  81          Review of Systems:  14 point review of systems was completed and negative except for those specially mentioned in this note    Physical Exam:    Heart Rate:  [66-90]   Temp:  [36.3 °C (97.4 °F)-36.4 °C (97.5 °F)]   Resp:  [10-51]   SpO2:  [97 %-100 %]     Physical Exam  Vitals reviewed.   Constitutional:       Appearance: He is ill-appearing.   HENT:      Head: Normocephalic and atraumatic.      Right Ear: External ear normal.      Left Ear: External ear normal.      Nose: Nose normal.      Mouth/Throat:      Mouth: Mucous membranes are moist.   Eyes:      Pupils: Pupils are equal, round, and reactive to light.      Comments: Sluggish reaction to light   Cardiovascular:      Rate and Rhythm: Normal rate and regular rhythm.   Pulmonary:      Breath sounds: Examination of the right-middle field reveals rales. Examination of the left-middle field reveals rales. Examination of the right-lower field reveals rales. Examination of the left-lower field reveals rales. Rales present.   Abdominal:      General: Abdomen is flat. Bowel sounds are normal.      Palpations: Abdomen is soft.   Genitourinary:     Penis: Normal.    Musculoskeletal:         General: No swelling.      Cervical back: Normal range of motion.      Left lower leg: No edema.      Comments: Right AKA   Skin:     General: Skin is warm.      Capillary Refill: Capillary refill takes less than 2 seconds.   Neurological:      General: No focal deficit present.      Motor: Weakness present.      Comments: No focal deficits identified. Patient intubated and sedated.   Pt response delayed to command          Objective:    I have reviewed all medications, laboratory results, and imaging pertinent for today's encounter    Patient resting in bed  with eyes opening to voice. Patient able to follow simple commands with extremities moving spontaneously and localizes.     Assessment/Plan:    I am currently managing this critically ill patient for the following problems:    Neuro/Psych/Pain Ctrl/Sedation:  Acute encephalopathy 2/2 CO2 narcosis   History of chronic pain on outpatient methadone   Home Methadone  Daily SAT/ SBT    Respiratory/ENT:  Acute hypercapnic respiratory failure 2/2 COPD exacerbation in the setting of RSV infection   Acute COPD exacerbation  Acute hypercapnia  Ventilator management, FiO2 to maintain SPO2 greater than 92%  Solumedrol, completed  Duonebs scheduled and PRN  Daily SBT    Cardiovascular:  Hypotension Resolved  Abdominal AAA with intramural thrombus   Concern for left leg ischemia with findings of left iliac/femoral occlusion  HTN   Hx CAD/PVD  Hx of HTN/HLD  Hx Systolic congestive heart failure (EF 30%)  Hx Ischemic cardiopathy s/p ICD placement     Vascular following, appreciate recommendations  Continue to old home aspirin/plavix with GI bleed, restart Aspirin Saturday  Resume lisinopril and metoprolol with parameters  No need for ATC only aspirin for antiplatelet per vascular, chronic    GI:  GI Bleed  H/H stable   protonix  GI following, appreciate recommendations  EES added per GI  EGD 3/6 showed  The cricopharynx, upper third of the esophagus, middle third of the esophagus, lower third of the esophagus and GE junction appeared normal.  4 large angioectasias measuring from 4 mm up to 6 mm in the cardia and fundus of the stomach; bleeding was observed; 7 lesions were completely ablated with argon plasma coagulation using a straight fire probe (30 W and 1.5 mL/min flow rate)  The duodenal bulb and 2nd part of the duodenum appeared normal.  Monitor daily CBC  Enteral feedings    Renal/Volume Status (Intra & Extravascular):  KRYSTLE, resolved  Hyperkalemia, resolved  Daily BMP  Urinary catheter for acute retention  silodosin  Strict  I&Os    Endocrine  Monitor for s/s of hypo/ hyperglycemia  Daily BMP    Heme/Onc:  Trend CBC  Hold heparin/ ATC, no indication per vascular    Infectious Disease:  Leukocytosis, ? Reactive, steroids  No active s/s of infection  Monitor CBC    MSK:  PT/ OT when able    Ethics/Code Status:  Full Code    :  DVT Prophylaxis: heparin subcu  GI Prophylaxis: Protonix  Bowel Regimen: none  Diet: enteral feeding  CVC: none  [place]: Right brachial   [place]: In place/retention  Restraints: Yes  Dispo: ICU    Critical Care Time:  40 minutes spent in preparing to see patient (I.e. review of medical records), evaluation of diagnostics (I.e. labs, imaging, etc.), documentation, discussing plan of care with patient/ family/ caregiver, and/ or coordination of care with multidisciplinary team. Time does not include completion of procedure time.     Plan discussed with Dr. Gaston Dent, APRN-CNP

## 2024-03-08 NOTE — PROGRESS NOTES
Placed pt on PS5P5 at 1120. Pt tolerating at this time. Unable to obtain NIF and VC.        At 1538, placed back on full support.  Pts systolic was 250 and his sats were dropping to 90%.

## 2024-03-08 NOTE — PROGRESS NOTES
Vascular Surgery Note    I saw and examined the patient and reviewed his vascular studies.  He has a right AKA.  He is intubated and sedated and cannot follow commands. The left foot is pulseless, cool, but has no signs of tissue loss such as gangrene or mottling. I am unable to determine if he has motor or sensory dysfunction.  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 He has three occluded bypass grafts in the left leg, and occluded SFA stents. Any reconstruction for the left leg would be extremely difficult, if not impossible. Certainly at this time given his critical medical issues, he is not a candidate for intervention on the left leg regardless of the chronicity of his arterial occlusions.  I recommend continue supportive care. Once he is extubated and following commands and medically stable, we can consider the needs for vascular reconstruction or amputation of the left leg based on his symptoms.     Guzman Benoit MD

## 2024-03-09 NOTE — CARE PLAN
The clinical goals for the shift include Patient will remain free from additional injury for the remainder of the shift.    Over the shift, the patient did make progress toward the following goals.     Problem: Pain - Adult  Goal: Verbalizes/displays adequate comfort level or baseline comfort level  Outcome: Progressing     Problem: Safety - Adult  Goal: Free from fall injury  Outcome: Progressing     Problem: Discharge Planning  Goal: Discharge to home or other facility with appropriate resources  Outcome: Progressing     Problem: Chronic Conditions and Co-morbidities  Goal: Patient's chronic conditions and co-morbidity symptoms are monitored and maintained or improved  Outcome: Progressing     Problem: Skin  Goal: Prevent/manage excess moisture  Outcome: Progressing  Flowsheets (Taken 3/9/2024 0957)  Prevent/manage excess moisture:   Cleanse incontinence/protect with barrier cream   Monitor for/manage infection if present  Goal: Promote/optimize nutrition  Outcome: Progressing  Flowsheets (Taken 3/9/2024 0957)  Promote/optimize nutrition: Monitor/record intake including meals     Problem: Indwelling Catheter Maintenance  Goal: I will have no complications from indwelling catheter  Outcome: Progressing     Problem: Physical Restraint  Goal: I will require minimum level of restraint  Outcome: Progressing     Problem: Safety - Medical Restraint  Goal: Remains free of injury from restraints (Restraint for Interference with Medical Device)  Outcome: Progressing  Goal: Free from restraint(s) (Restraint for Interference with Medical Device)  Outcome: Progressing     Problem: Fall/Injury  Goal: Not fall by end of shift  Outcome: Progressing  Goal: Be free from injury by end of the shift  Outcome: Progressing  Goal: Verbalize understanding of personal risk factors for fall in the hospital  Outcome: Progressing  Goal: Verbalize understanding of risk factor reduction measures to prevent injury from fall in the home  Outcome:  Progressing  Goal: Use assistive devices by end of the shift  Outcome: Progressing  Goal: Pace activities to prevent fatigue by end of the shift  Outcome: Progressing     Problem: Knowledge Deficit  Goal: Patient/family/caregiver demonstrates understanding of disease process, treatment plan, medications, and discharge instructions  Outcome: Progressing

## 2024-03-09 NOTE — NURSING NOTE
Pt attempted SBT , lasted 7 minutes and went hypertensive 240/90, heart rate of 110, O2 droped to 90% and resp rate went to 40s.     RT came and switched back to PARUL.   Liseth NP at bedside

## 2024-03-09 NOTE — PROGRESS NOTES
[facility] Critical Care Medicine       Date:  3/9/2024  Patient:  Luis Peacock Jr.  YOB: 1950  MRN:  [id number]   Admit Date:  3/3/2024  ========================================================================================================    Chief Complaint   Patient presents with    Shortness of Breath     80% in RA, hx of CPOD, smoker, HF. Squad gave 2g Mg, 125 solumedrol and 3 duonebs         History of Present Illness:  Luis Peacock Jr. is a 73 y.o. male, from home with a past medical history of CAD status post PCI, ischemic cardiomyopathy status post ICD, systolic CHF, PVD status post Rt AKA, HTN, HLD, COPD, who presented with worsening shortness of breath after he woke up. He tried to use his home breathing treatment but no improvement. Called EMS and o their arrival he was saturating 80% on RA. They gave him an albuterol which did not improve him so they placed him on 3 DuoNeb treatments, given 125 of IV Solu-Medrol and 2 g of IV magnesium. Pt also is complaining of abdominal pain, and chest discomfort and is asking for relief of his distress. Pt denies LOC, bleeding, nausea, vomiting, diarrhea, constipation, urinary symptoms. Patient is from home and ambulates with crutches and wheelchair, lives with his family       Interval ICU Events:  3/4/2024: Patient transferred to the ICU for worsening hypercapnia and somnolence.   3/5: Mental status and agitation improved on BiPap and Precedex.  Remains with hypercapnea on ABG.  Early this AM noted to have melanotic stool and blood in mosley.  Heparin paused.  HGB down 3 grams on labs. 1U PRBCs ordered, protonix, K cocktail, and GI consulted.  Heparin reversed with protamine    Attending was called to bedside by nursing shortly after seeing the patient this morning on rounds. Patient hypotensive with more large melanotic stool present. He remains encephalopathic. Blood pressure on cuff 60 systolic. Levophed started, massive transfusion protocol  initiated. He had already received protamine and IV Protonix push. GI consult is pending.   Emergent right IJ Cordis placed as well as right radial arterial line (please see separate procedure note). Please note stability of these procedures was not able to be obtained due to the patient's critical condition. Follow-up. Patient was then resuscitated with 4 units of PRBCs and 4 units of FFP with improvement of his blood pressure. He was then intubated using ketamine and rocuronium without incident. The decision was made to resuscitate the patient prior to induction for intubation to prevent cardiac arrest. He tolerated all these procedures well.      EGD showed the following.  The duodenum appeared normal.  Severe, generalized friable and hemorrhagic mucosa in the fundus of the stomach;  There was a large amount of old blood and clots in the stomach that partially obscured the views of the fundus and greater curvature.  There was linear trauma in the mid esophagus (likely from OGT trauma) that was oozing.  This stopped oozing with injection of Epi.  There was a significant amount of old blood and clots in the oropharynx, and bruising of the palate.  3/6: Overnight, patient continued to need suction with removal of a moderate amount of dark red fluid. This morning, patient had a bowel movement that contained bright red blood. No other acute events identified overnight. Patient's levo weaned down and discontinued this morning. Patient tolerating vent well with propofol and fentanyl for sedation and analgesic. Patient scheduled for repeat EGD this afternoon.   3/7 Overnight patient became hypertensive with BP of 196/94. Patient administered IV hydralazine which resolved HTN. No other acute events overnight. Anticoagulation to be restarted tomorrow and aspirin to be restarted on Saturday.     EGD yesterday showed the following;  The cricopharynx, upper third of the esophagus, middle third of the esophagus, lower third of  the esophagus and GE junction appeared normal.  4 large angioectasias measuring from 4 mm up to 6 mm in the cardia and fundus of the stomach; bleeding was observed; 7 lesions were completely ablated with argon plasma coagulation using a straight fire probe (30 W and 1.5 mL/min flow rate)  The duodenal bulb and 2nd part of the duodenum appeared normal.  3/8: No acute events overnight. Intubated with FiO2 30%, PEEP 5. Sedated with precedex and fentanyl. Intermittently follows simple commands. Hypertensive and tachycardic with intervention/ sedation held. VSS, NSR with PAC, HR 60-70s. No s/s of bleeding. Dark/ old bloody drainage with suctioning, H/H stable.     3/9: No acute events overnight.  Failed SBT due to hypotension and tachypnea.  Remains ventilated with 30% FiO2, PEEP 5, sedated with Precedex infusion.  Follow simple commands.  Hemodynamically stable off vasopressors.  Stable laboratory values.  Diurese today with 20 mg Lasix.    Medical History:  Past Medical History:   Diagnosis Date    Anesthesia of skin     Numbness    Angina, class IV (CMS/[facility])     CHF (congestive heart failure) (CMS/[facility])     Chronic obstructive pulmonary disease with (acute) exacerbation (CMS/[facility]) 12/08/2022    Acute exacerbation of chronic obstructive pulmonary disease (COPD)    Coronary artery disease     Cough, unspecified     Cough    Disorder of prostate, unspecified     Prostate disease    Encounter for general adult medical examination without abnormal findings 12/09/2021    Encounter for Medicare annual wellness exam    Encounter for immunization 10/16/2020    Encounter for immunization    Functional dyspepsia     Indigestion    Hypertension     Ischemic cardiomyopathy     Local infection of the skin and subcutaneous tissue, unspecified 09/08/2020    Staph skin infection    Mixed hyperlipidemia 12/09/2021    Mixed hyperlipidemia    Muscle weakness (generalized)     Localized muscle weakness    Nicotine dependence, cigarettes,  uncomplicated 10/16/2020    Heavy cigarette smoker    Other iron deficiency anemias 10/11/2021    Hypochromic erythrocytes    Other spondylosis, cervical region 03/16/2022    Other spondylosis, cervical region    Pain in leg, unspecified 03/16/2022    Leg pain    Personal history of other diseases of the circulatory system     History of cardiac disorder    Personal history of other diseases of the circulatory system     History of hypertension    Personal history of other diseases of the circulatory system 12/09/2021    History of class IV angina pectoris    Personal history of other diseases of the circulatory system 03/16/2022    History of vascular disorder    Personal history of other diseases of the musculoskeletal system and connective tissue     History of arthritis    Personal history of other diseases of the musculoskeletal system and connective tissue 03/16/2022    History of low back pain    Personal history of other diseases of the musculoskeletal system and connective tissue 03/16/2022    History of neck pain    Personal history of other specified conditions     History of nausea    Personal history of other specified conditions     History of chest pain    Personal history of other specified conditions     History of heartburn    Postnasal drip     Post-nasal drip    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 AKA on right side    CORONARY ANGIOPLASTY WITH STENT PLACEMENT  04/27/2017    Cath Placement Of Stent 1    OTHER SURGICAL HISTORY  11/09/2021    Tooth extraction    OTHER SURGICAL HISTORY  11/09/2021    Leg surgery    OTHER SURGICAL HISTORY  11/09/2021     Cardioverter defibrillator insertion    OTHER SURGICAL HISTORY  11/09/2021    Amputation Of Leg Above Knee    OTHER SURGICAL HISTORY  04/03/2019    Cardiac catheterization with stent placement     Medications Prior to Admission   Medication Sig Dispense Refill Last Dose    albuterol 2.5 mg /3 mL (0.083 %) nebulizer solution USE 1 UNIT DOSE EVERY 4-6 HOURS AS NEEDED FOR WHEEZING .   3/2/2024    albuterol 90 mcg/actuation inhaler Inhale 2 puffs by mouth up to every 4 hours as needed for shortness of breath, wheezing, or prior to exercise.   3/2/2024    amLODIPine (Norvasc) 5 mg tablet Take 1 tablet (5 mg) by mouth once daily.   3/2/2024    aspirin 81 mg chewable tablet Chew 1 tablet (81 mg) once daily.   3/2/2024    atorvastatin (Lipitor) 40 mg tablet Take 1 tablet (40 mg) by mouth once daily at bedtime.   3/2/2024    cholecalciferol (Vitamin D-3) 25 MCG (1000 UT) tablet TAKE AS DIRECTED.   3/2/2024    clopidogrel (Plavix) 75 mg tablet Take 1 tablet (75 mg) by mouth once daily.   3/2/2024    furosemide (Lasix) 20 mg tablet Take 1 tablet (20 mg) by mouth once daily.   3/2/2024    gabapentin (Neurontin) 300 mg capsule take 2-4 capsules BY MOUTH THREE TIMES DAILY AS DIRECTED 1080 capsule 3 3/2/2024    lisinopril 5 mg tablet Take 1 tablet (5 mg) by mouth once daily.   3/2/2024    methadone (Dolophine) 5 mg tablet Take 1 tablet (5 mg) by mouth every 6 hours.   3/2/2024    methadone (Dolophine) 5 mg tablet Take 1 tablet (5 mg) by mouth every 6 hours if needed for severe pain (7 - 10). Do not start before February 11, 2024. 120 tablet 0     metoprolol succinate XL (Toprol-XL) 50 mg 24 hr tablet TAKE 1 TABLET BY MOUTH DAILY IN THE EVENING 90 tablet 3 3/2/2024    naloxone (Narcan) 4 mg/0.1 mL nasal spray USE 1 SPRAY IN ONE NOSTRILONCE AS NEEDED FOR OVERDOSE SYMPTOMS. MAY REPEAT DOSE IF NEEDED IN 2-3 MINUTES IN YOUR OTHER NOSTRIL.   Unknown    nitroglycerin (Nitrostat) 0.4 mg SL tablet DISSOLVE 1 TABLET UNDER THE TONGUE AS  NEEDED FOR CHEST PAIN- MAY REPEA   3/2/2024    pantoprazole (ProtoNix) 40 mg EC tablet TAKE ONE TABLET BY MOUTH DAILY 90 tablet 3 3/2/2024     Morphine and Simvastatin  Social History     Tobacco Use    Smoking status: Every Day     Packs/day: .2     Types: Cigarettes    Smokeless tobacco: Never   Substance Use Topics    Alcohol use: Not Currently    Drug use: Never     Family History   Problem Relation Name Age of Onset    Alzheimer's disease Mother  74    Other (old age) Father  81          Review of Systems:  14 point review of systems was completed and negative except for those specially mentioned in this note    Physical Exam:    Heart Rate:  []   Temp:  [35.9 °C (96.6 °F)-36.7 °C (98.1 °F)]   Resp:  [11-56]   Weight:  [42.8 kg (94 lb 5.7 oz)-44.5 kg (98 lb 1.7 oz)]   SpO2:  [89 %-100 %]     Physical Exam  Vitals reviewed.   Constitutional:       Appearance: He is ill-appearing.   HENT:      Head: Normocephalic and atraumatic.      Right Ear: External ear normal.      Left Ear: External ear normal.      Nose: Nose normal.      Mouth/Throat:      Mouth: Mucous membranes are moist.   Eyes:      Pupils: Pupils are equal, round, and reactive to light.      Comments: Sluggish reaction to light   Cardiovascular:      Rate and Rhythm: Normal rate and regular rhythm.   Pulmonary:      Breath sounds: Examination of the right-middle field reveals rales. Examination of the left-middle field reveals rales. Examination of the right-lower field reveals rales. Examination of the left-lower field reveals rales. Rales present.   Abdominal:      General: Abdomen is flat. Bowel sounds are normal.      Palpations: Abdomen is soft.   Genitourinary:     Penis: Normal.    Musculoskeletal:         General: No swelling.      Cervical back: Normal range of motion.      Left lower leg: No edema.      Comments: Right AKA   Skin:     General: Skin is warm.      Capillary Refill: Capillary refill takes less than 2 seconds.    Neurological:      General: No focal deficit present.      Motor: Weakness present.      Comments: -Drowsy, awakens to stimulation to follow simple commands           Objective:    I have reviewed all medications, laboratory results, and imaging pertinent for today's encounter    Patient resting in bed with eyes opening to voice. Patient able to follow simple commands with extremities moving spontaneously and localizes.     Assessment/Plan:    I am currently managing this critically ill patient for the following problems:    Neuro/Psych/Pain Ctrl/Sedation:  #Acute encephalopathy - Multifactorial in setting of critical illness   #History of chronic pain on outpatient methadone   --Neurochecks, CAM assessment, delirium precautions  --Precedex infusion for agitation, RASS goal -1-0  --Continue home methadone every 6 hours    Respiratory/ENT:  #Acute hypercapnic respiratory failure 2/2 COPD exacerbation in the setting of RSV infection   #Acute COPD exacerbation  -- Completed Solumedrol  -- Duonebs scheduled and PRN  -- Continue ventilator support, wean for O2 goal >92%  -- Daily SAT/SBT    Cardiovascular:  #Abdominal AAA with intramural thrombus   #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  #Hx HTN, HLD, CAD, PVD  #Hx HFrEF, Ischemic Cardiomyopathy s/p ICD Placement  -- Shock resolved, off vasopressors, keep MAP >60  --Per vascular, not a candidate for surgical invention of LLL ischemia at this time given overall illness and chronicity of occlusion  -- Resume home aspirin, per vascular no need for DAPT or full anticoagulation  -- Continue metoprolol and lisinopril with hold parameters    GI:  #Acute Upper GI Bleed   EGD 3/6 with:  -4 large angioectasias measuring from 4 mm up to 6 mm in the cardia and fundus of the stomach - intervention was performed  -- H/H remains stable, no further bleeding  -- Continue BID PPI    Renal/Volume Status (Intra & Extravascular):  #BPH  Prior KRYSTLE and hyperkalemia now  resolved  -- Patient edematous and net + 4L on 3/9  -- Diureses with intermittent 20mg IV lasix, goal -1L  -- Urinary catheter for retention, continue while intubated then void trial  -- Continue home Silodosin  -- Daily BMP and electrolytes    Endocrine  -- Accuchecks Q6h    Heme/Onc:  #Acute Blood Loss Anemia 2/2 Upper GIB  -- management of GIB as above, no further bleeding noted  -- No further DAPT or full anticoagulation  -- Daily CBC, transfuse for Hgb goal > 7    Infectious Disease:  #Leukocytosis - Suspected reactive in setting of steroids and critical illness  -- No s/s of ongoing infection  -- If febrile or increasing leukocytosis would panculture and consider starting antibiotics  -- Monitor SIRS    MSK:  -- PT/ OT when able    Ethics/Code Status:  Full Code    :  DVT Prophylaxis: heparin subcu  GI Prophylaxis: Protonix BID  Bowel Regimen: none  Diet: enteral feeding  CVC: none  [place]: Right brachial   [place]: In place/retention  Restraints: Yes  Dispo: ICU    Critical Care Time:  40 minutes spent in preparing to see patient (I.e. review of medical records), evaluation of diagnostics (I.e. labs, imaging, etc.), documentation, discussing plan of care with patient/ family/ caregiver, and/ or coordination of care with multidisciplinary team. Time does not include completion of procedure time.     MICAELA Higgins-CNP  Pulmonary & Critical Care Medicine  [facility]

## 2024-03-09 NOTE — PROGRESS NOTES
Pt successfully extubated, per order at 1330. Placed on 3L NC. Sating 97-98%. No issues at this time.  Will wean as tolerated

## 2024-03-09 NOTE — PROGRESS NOTES
Placed pt on PS8P5 at 1059. Pt tolerating at this time.  All numbers are in the safe range.  Will continue to monitor.

## 2024-03-10 NOTE — PROGRESS NOTES
No further action needed at this time.  Most recent lab results have been reported to CLOZAPINE/REMS program.   [facility] Critical Care Medicine       Date:  3/10/2024  Patient:  Luis Peacock Jr.  YOB: 1950  MRN:  [id number]   Admit Date:  3/3/2024  ========================================================================================================    Chief Complaint   Patient presents with    Shortness of Breath     80% in RA, hx of CPOD, smoker, HF. Squad gave 2g Mg, 125 solumedrol and 3 duonebs         History of Present Illness:  Luis Peacock Jr. is a 73 y.o. male, from home with a past medical history of CAD status post PCI, ischemic cardiomyopathy status post ICD, systolic CHF, PVD status post Rt AKA, HTN, HLD, COPD, who presented with worsening shortness of breath after he woke up. He tried to use his home breathing treatment but no improvement. Called EMS and o their arrival he was saturating 80% on RA. They gave him an albuterol which did not improve him so they placed him on 3 DuoNeb treatments, given 125 of IV Solu-Medrol and 2 g of IV magnesium. Pt also is complaining of abdominal pain, and chest discomfort and is asking for relief of his distress. Pt denies LOC, bleeding, nausea, vomiting, diarrhea, constipation, urinary symptoms. Patient is from home and ambulates with crutches and wheelchair, lives with his family       Interval ICU Events:  3/4/2024: Patient transferred to the ICU for worsening hypercapnia and somnolence.    3/5: On BiPAP.  Noted to have melena stool and blood in [place], and hemoglobin dropped 3 g, hypotensive.  Placed on norepinephrine infusion.  Developed profound shock requiring MTP, received 4u PRBC, 4u FFP, 1 pack plt.  Heparin gtt stopped and reversed with Protamine. Norepinephrine infusion.  Intubated for shock and hypercapnic respiratory failure.  EGD with linear trauma esophagus, epi injection done.    3/6: Patient with continued dark red blood in stool.  Continues to require norepinephrine infusion.  Remains intubated with mechanical ventilation.  On propofol fentanyl  for sedation.  Repeat EGD showed for large 4 large angioectasias in the fundus of the stomach with active bleeding, 7 lesions were ablated.    3/7 Overnight patient became hypertensive with BP of 196/94. Patient administered IV hydralazine which resolved HTN. No other acute events overnight. Anticoagulation to be restarted tomorrow and aspirin to be restarted on Saturday.     3/8: No acute events overnight. Intubated with FiO2 30%, PEEP 5. Sedated with precedex and fentanyl. Intermittently follows simple commands. Hypertensive and tachycardic with intervention/ sedation held. VSS, NSR with PAC, HR 60-70s. No s/s of bleeding. Dark/ old bloody drainage with suctioning, H/H stable.     3/9: No acute events overnight.  Failed SBT due to hypotension and tachypnea.  Remains ventilated with 30% FiO2, PEEP 5, sedated with Precedex infusion.  Follow simple commands.  Hemodynamically stable off vasopressors.  Stable laboratory values.  Diurese today with 20 mg Lasix.    3/10: Extubated yesterday afternoon.  Awake, follows simple commands, slightly confused.  Wore BiPAP 20/5, 40% overnight.  Will wean to nasal cannula this morning.  Hemodynamically stable, slightly hypertensive.  Responded well to diuresis, -1.1L yesterday. Lab values unremarkable.    Medical History:  Past Medical History:   Diagnosis Date    Anesthesia of skin     Numbness    Angina, class IV (CMS/[facility])     CHF (congestive heart failure) (CMS/[facility])     Chronic obstructive pulmonary disease with (acute) exacerbation (CMS/[facility]) 12/08/2022    Acute exacerbation of chronic obstructive pulmonary disease (COPD)    Coronary artery disease     Cough, unspecified     Cough    Disorder of prostate, unspecified     Prostate disease    Encounter for general adult medical examination without abnormal findings 12/09/2021    Encounter for Medicare annual wellness exam    Encounter for immunization 10/16/2020    Encounter for immunization    Functional dyspepsia      Indigestion    Hypertension     Ischemic cardiomyopathy     Local infection of the skin and subcutaneous tissue, unspecified 09/08/2020    Staph skin infection    Mixed hyperlipidemia 12/09/2021    Mixed hyperlipidemia    Muscle weakness (generalized)     Localized muscle weakness    Nicotine dependence, cigarettes, uncomplicated 10/16/2020    Heavy cigarette smoker    Other iron deficiency anemias 10/11/2021    Hypochromic erythrocytes    Other spondylosis, cervical region 03/16/2022    Other spondylosis, cervical region    Pain in leg, unspecified 03/16/2022    Leg pain    Personal history of other diseases of the circulatory system     History of cardiac disorder    Personal history of other diseases of the circulatory system     History of hypertension    Personal history of other diseases of the circulatory system 12/09/2021    History of class IV angina pectoris    Personal history of other diseases of the circulatory system 03/16/2022    History of vascular disorder    Personal history of other diseases of the musculoskeletal system and connective tissue     History of arthritis    Personal history of other diseases of the musculoskeletal system and connective tissue 03/16/2022    History of low back pain    Personal history of other diseases of the musculoskeletal system and connective tissue 03/16/2022    History of neck pain    Personal history of other specified conditions     History of nausea    Personal history of other specified conditions     History of chest pain    Personal history of other specified conditions     History of heartburn    Postnasal drip     Post-nasal drip    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 AKA on right side    CORONARY ANGIOPLASTY WITH STENT PLACEMENT  04/27/2017    Cath Placement Of Stent 1    OTHER SURGICAL HISTORY  11/09/2021    Tooth extraction    OTHER SURGICAL HISTORY  11/09/2021    Leg surgery    OTHER SURGICAL HISTORY  11/09/2021    Cardioverter defibrillator insertion    OTHER SURGICAL HISTORY  11/09/2021    Amputation Of Leg Above Knee    OTHER SURGICAL HISTORY  04/03/2019    Cardiac catheterization with stent placement     Medications Prior to Admission   Medication Sig Dispense Refill Last Dose    albuterol 2.5 mg /3 mL (0.083 %) nebulizer solution USE 1 UNIT DOSE EVERY 4-6 HOURS AS NEEDED FOR WHEEZING .   3/2/2024    albuterol 90 mcg/actuation inhaler Inhale 2 puffs by mouth up to every 4 hours as needed for shortness of breath, wheezing, or prior to exercise.   3/2/2024    amLODIPine (Norvasc) 5 mg tablet Take 1 tablet (5 mg) by mouth once daily.   3/2/2024    aspirin 81 mg chewable tablet Chew 1 tablet (81 mg) once daily.   3/2/2024    atorvastatin (Lipitor) 40 mg tablet Take 1 tablet (40 mg) by mouth once daily at bedtime.   3/2/2024    cholecalciferol (Vitamin D-3) 25 MCG (1000 UT) tablet TAKE AS DIRECTED.   3/2/2024    clopidogrel (Plavix) 75 mg tablet Take 1 tablet (75 mg) by mouth once daily.   3/2/2024    furosemide (Lasix) 20 mg tablet Take 1 tablet (20 mg) by mouth once daily.   3/2/2024    gabapentin (Neurontin) 300 mg capsule take 2-4 capsules BY MOUTH THREE TIMES DAILY AS DIRECTED 1080 capsule 3 3/2/2024    lisinopril 5 mg tablet Take 1 tablet (5 mg) by mouth once daily.   3/2/2024    methadone (Dolophine) 5 mg tablet Take 1 tablet (5 mg) by mouth every 6 hours.   3/2/2024    methadone (Dolophine) 5 mg tablet Take 1 tablet (5 mg) by mouth every 6 hours if needed for severe pain (7 - 10). Do not start before February 11, 2024. 120 tablet 0     metoprolol succinate XL (Toprol-XL) 50 mg 24 hr tablet TAKE 1 TABLET  BY MOUTH DAILY IN THE EVENING 90 tablet 3 3/2/2024    naloxone (Narcan) 4 mg/0.1 mL nasal spray USE 1 SPRAY IN ONE NOSTRILONCE AS NEEDED FOR OVERDOSE SYMPTOMS. MAY REPEAT DOSE IF NEEDED IN 2-3 MINUTES IN YOUR OTHER NOSTRIL.   Unknown    nitroglycerin (Nitrostat) 0.4 mg SL tablet DISSOLVE 1 TABLET UNDER THE TONGUE AS NEEDED FOR CHEST PAIN- MAY REPEA   3/2/2024    pantoprazole (ProtoNix) 40 mg EC tablet TAKE ONE TABLET BY MOUTH DAILY 90 tablet 3 3/2/2024     Morphine and Simvastatin  Social History     Tobacco Use    Smoking status: Every Day     Packs/day: .2     Types: Cigarettes    Smokeless tobacco: Never   Substance Use Topics    Alcohol use: Not Currently    Drug use: Never     Family History   Problem Relation Name Age of Onset    Alzheimer's disease Mother  74    Other (old age) Father  81          Review of Systems:  14 point review of systems was completed and negative except for those specially mentioned in this note    Physical Exam:    Heart Rate:  []   Temp:  [36 °C (96.8 °F)-36.4 °C (97.5 °F)]   Resp:  [14-30]   Weight:  [41.9 kg (92 lb 6 oz)]   SpO2:  [90 %-100 %]     Physical Exam  Vitals reviewed.   Constitutional:       Appearance: He is ill-appearing.   HENT:      Head: Normocephalic and atraumatic.      Right Ear: External ear normal.      Left Ear: External ear normal.      Nose: Nose normal.      Mouth/Throat:      Mouth: Mucous membranes are moist.   Eyes:      Pupils: Pupils are equal, round, and reactive to light.      Comments: Sluggish reaction to light   Cardiovascular:      Rate and Rhythm: Regular rhythm. Tachycardia present.      Pulses: Normal pulses.      Heart sounds: Normal heart sounds. No murmur heard.     No friction rub. No gallop.   Pulmonary:      Effort: No respiratory distress.      Breath sounds: No stridor. Wheezing present. No rales.      Comments: Increased inspiratory effort  Abdominal:      General: Abdomen is flat. Bowel sounds are normal.      Palpations:  Abdomen is soft.   Genitourinary:     Penis: Normal.    Musculoskeletal:         General: No swelling.      Cervical back: Normal range of motion and neck supple.      Left lower leg: No edema.      Comments: Right AKA   Skin:     General: Skin is warm and dry.      Capillary Refill: Capillary refill takes less than 2 seconds.   Neurological:      General: No focal deficit present.      Mental Status: He is alert.      Motor: Weakness present.      Comments: Somnolent. Does awaken and answers simple questions but remains slightly confused           Objective:    I have reviewed all medications, laboratory results, and imaging pertinent for today's encounter    Patient resting in bed with eyes opening to voice. Patient able to follow simple commands with extremities moving spontaneously and localizes.     Assessment/Plan:    I am currently managing this critically ill patient for the following problems:    Neuro/Psych/Pain Ctrl/Sedation:  #Acute encephalopathy - Multifactorial in setting of critical illness   #History of chronic pain on outpatient methadone   -- Neurochecks, CAM assessment, delirium precautions  -- Precedex infusion for agitation, RASS goal -1-0, work to titrate off now that patient Is extubated  -- Continue home methadone every 6 hours  -- Can utilize 4mg Morphine for breakthrough pain or significant air hunger    Respiratory/ENT:  #Acute hypercapnic respiratory failure 2/2 COPD exacerbation in the setting of RSV infection   #Acute COPD exacerbation - Resolved  -- Extubated 3/9  -- Completed Solumedrol  -- Duonebs scheduled and PRN  -- BiPAP 20/5 at night and as needed  -- Wean supplemental O2 for spO2 goal > 88%    Cardiovascular:  #Abdominal AAA with intramural thrombus   #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  #Hx HTN, HLD, CAD, PVD  #Hx HFrEF, Ischemic Cardiomyopathy s/p ICD Placement  -- Shock resolved, off vasopressors, keep MAP >60  -- Per vascular, not a candidate for surgical  invention of LLL ischemia at this time given overall illness and chronicity of occlusion  -- Resume home aspirin, per vascular no need for DAPT or full anticoagulation  -- Continue metoprolol and lisinopril with hold parameters, resume home amlodipine when able    GI:  #Acute Upper GI Bleed - Resolved  EGD 3/6 with:  -4 large angioectasias measuring from 4 mm up to 6 mm in the cardia and fundus of the stomach - intervention was performed  -- H/H remains stable, no further bleeding  -- Continue BID PPI    Renal/Volume Status (Intra & Extravascular):  #BPH  Prior KRYSTLE and hyperkalemia now resolved  -- Patient edematous and net + 4L on 3/9  -- Diureses with intermittent 20mg IV lasix, goal -1L  -- Remove mosley, void trial  -- Continue home Silodosin  -- Daily BMP and electrolytes    Endocrine  -- Accuchecks Q6h    Heme/Onc:  #Acute Blood Loss Anemia 2/2 Upper GIB  -- No further GIB noted, resolved  -- No further DAPT or full anticoagulation  -- Daily CBC, transfuse for Hgb goal > 7    Infectious Disease:  #Leukocytosis - Suspected reactive in setting of steroids and critical illness  -- No s/s of ongoing infection  -- Check procalcitonin 3/10 - prior was 0.14  -- If febrile or increasing leukocytosis would panculture and consider starting antibiotics  -- Monitor SIRS    MSK:  -- PT/ OT when able    Ethics/Code Status:  Full Code    :  DVT Prophylaxis: heparin subcu  GI Prophylaxis: Protonix BID  Bowel Regimen: none  Diet: enteral feeding  CVC: none  Collins: Remove  Mosley: Remove  Restraints: Yes  Dispo: ICU    Critical Care Time:  40 minutes spent in preparing to see patient (I.e. review of medical records), evaluation of diagnostics (I.e. labs, imaging, etc.), documentation, discussing plan of care with patient/ family/ caregiver, and/ or coordination of care with multidisciplinary team. Time does not include completion of procedure time.     Hiram Beck, APRN-CNP  Pulmonary & Critical Care  Medicine  [facility]

## 2024-03-10 NOTE — CARE PLAN
The clinical goals for the shift include Patient will remain free from additional injury for the remainder of the shift.    Over the shift, the patient did make progress toward the following goals.     Problem: Pain - Adult  Goal: Verbalizes/displays adequate comfort level or baseline comfort level  Outcome: Progressing     Problem: Safety - Adult  Goal: Free from fall injury  Outcome: Progressing     Problem: Discharge Planning  Goal: Discharge to home or other facility with appropriate resources  Outcome: Progressing     Problem: Chronic Conditions and Co-morbidities  Goal: Patient's chronic conditions and co-morbidity symptoms are monitored and maintained or improved  Outcome: Progressing     Problem: Skin  Goal: Prevent/manage excess moisture  Outcome: Progressing  Goal: Promote/optimize nutrition  Outcome: Progressing     Problem: Indwelling Catheter Maintenance  Goal: I will have no complications from indwelling catheter  Outcome: Met     Problem: Physical Restraint  Goal: I will require minimum level of restraint  Outcome: Met     Problem: Safety - Medical Restraint  Goal: Remains free of injury from restraints (Restraint for Interference with Medical Device)  Outcome: Met  Goal: Free from restraint(s) (Restraint for Interference with Medical Device)  Outcome: Met     Problem: Fall/Injury  Goal: Not fall by end of shift  Outcome: Progressing  Goal: Be free from injury by end of the shift  Outcome: Progressing  Goal: Verbalize understanding of personal risk factors for fall in the hospital  Outcome: Progressing  Goal: Verbalize understanding of risk factor reduction measures to prevent injury from fall in the home  Outcome: Progressing  Goal: Use assistive devices by end of the shift  Outcome: Progressing  Goal: Pace activities to prevent fatigue by end of the shift  Outcome: Progressing     Problem: Knowledge Deficit  Goal: Patient/family/caregiver demonstrates understanding of disease process, treatment  plan, medications, and discharge instructions  Outcome: Progressing     Problem: Mechanical Ventilation  Goal: Patient Will Maintain Patent Airway  Outcome: Met  Goal: Oral health is maintained or improved  Outcome: Met  Goal: Tracheostomy will be managed safely  Outcome: Met  Goal: ET tube will be managed safely  Outcome: Met  Goal: Ability to express needs and understand communication  Outcome: Met  Goal: Mobility/activity is maintained at optimum level for patient  Outcome: Met     Patient has been extubated from the mechanical ventilator. The mosley catheter and central line have been removed. The restraints have been discontinued.

## 2024-03-11 NOTE — CONSULTS
Consults    Reason For Consult  Goals of care     History Of Present Illness  Luis Peacock Jr. is a 73 y.o. male with past medical history of CAD s/p PCI, ischemic cardiomyopathy s/p ICD, systolic CHF, PVD s/p rt AKA, HTN, HLD, COPD who presented with worsening SOB.  He was also complaining of severe abdominal pain.  He was transferred to ICU for worsening hypercapnia and somnolence with melenotic stools and blood in [place].  HGB dropped 3g, developed profound shock requiring pressors and MTP.  Received 4uprbc's, 4uFFP, 1pk plts.  Intubated.  EGD completed which showed linear trauma esophagus, epi injection done.  Repeat EGD d/t continue dark stool revealed 4 large angioectasias in the fundus of the stomach, 7 lesions were ablated.  Of note, he was also evaluated by vascular surgery earlier this admission for lack of pulses in left leg.  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 He has three occluded bypass grafts in the left leg, and occluded SFA stents. Any reconstruction for the left leg would be extremely difficult, if not impossible  Amuptation/revascularization is deferred until pt more stable.  Overall poor prognosis.  He was extubated yesterday but overall not doing well and high risk of re-intubation    Personal/Social History  He reports that he has been smoking cigarettes. He has been smoking an average of .2 packs per day. He has never used smokeless tobacco. He reports that he does not currently use alcohol. He reports that he does not use drugs.    Past Medical History  He has a past medical history of Anesthesia of skin, Angina, class IV (CMS/[facility]), CHF (congestive heart failure) (CMS/[facility]), Chronic obstructive pulmonary disease with (acute) exacerbation (CMS/[facility]) (12/08/2022), Coronary artery disease, Cough, unspecified, Disorder of prostate, unspecified, Encounter for general adult medical examination without abnormal findings (12/09/2021), Encounter for immunization  (10/16/2020), Functional dyspepsia, Hypertension, Ischemic cardiomyopathy, Local infection of the skin and subcutaneous tissue, unspecified (09/08/2020), Mixed hyperlipidemia (12/09/2021), Muscle weakness (generalized), Nicotine dependence, cigarettes, uncomplicated (10/16/2020), Other iron deficiency anemias (10/11/2021), Other spondylosis, cervical region (03/16/2022), Pain in leg, unspecified (03/16/2022), Personal history of other diseases of the circulatory system, Personal history of other diseases of the circulatory system, Personal history of other diseases of the circulatory system (12/09/2021), Personal history of other diseases of the circulatory system (03/16/2022), Personal history of other diseases of the musculoskeletal system and connective tissue, Personal history of other diseases of the musculoskeletal system and connective tissue (03/16/2022), Personal history of other diseases of the musculoskeletal system and connective tissue (03/16/2022), Personal history of other specified conditions, Personal history of other specified conditions, Personal history of other specified conditions, Postnasal drip, 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    Surgical History  He has a past surgical history that includes Coronary angioplasty with stent (04/27/2017); Other surgical history (11/09/2021); Other surgical history (11/09/2021); Other surgical history (11/09/2021); Other surgical history (11/09/2021); Other surgical history (04/03/2019); and Cardiac catheterization (Left, 1/15/2024).     Family History  Family History   Problem Relation Name Age of Onset    Alzheimer's disease Mother  74    Other (old age) Father  81     Allergies  Simvastatin         Physical Exam  GEN: distressed, confused, ill appearing  HEENT: wearing bipap, no JVD  CV: tachy on monitor  PULM: +resp distress  ABD: soft  MSK: +right AKA, moves all ext  SKIN: no rashes noted  NEURO: confused,  "alert    Last Recorded Vitals  Blood pressure 104/53, pulse (!) 128, temperature 36.8 °C (98.2 °F), temperature source Temporal, resp. rate 22, height 1.626 m (5' 4\"), weight (!) 41.9 kg (92 lb 6 oz), SpO2 93 %.    Relevant Results  No current facility-administered medications on file prior to encounter.     Current Outpatient Medications on File Prior to Encounter   Medication Sig Dispense Refill    albuterol 2.5 mg /3 mL (0.083 %) nebulizer solution USE 1 UNIT DOSE EVERY 4-6 HOURS AS NEEDED FOR WHEEZING .      albuterol 90 mcg/actuation inhaler Inhale 2 puffs by mouth up to every 4 hours as needed for shortness of breath, wheezing, or prior to exercise.      amLODIPine (Norvasc) 5 mg tablet Take 1 tablet (5 mg) by mouth once daily.      aspirin 81 mg chewable tablet Chew 1 tablet (81 mg) once daily.      atorvastatin (Lipitor) 40 mg tablet Take 1 tablet (40 mg) by mouth once daily at bedtime.      cholecalciferol (Vitamin D-3) 25 MCG (1000 UT) tablet TAKE AS DIRECTED.      clopidogrel (Plavix) 75 mg tablet Take 1 tablet (75 mg) by mouth once daily.      furosemide (Lasix) 20 mg tablet Take 1 tablet (20 mg) by mouth once daily.      gabapentin (Neurontin) 300 mg capsule take 2-4 capsules BY MOUTH THREE TIMES DAILY AS DIRECTED 1080 capsule 3    lisinopril 5 mg tablet Take 1 tablet (5 mg) by mouth once daily.      methadone (Dolophine) 5 mg tablet Take 1 tablet (5 mg) by mouth every 6 hours.      methadone (Dolophine) 5 mg tablet Take 1 tablet (5 mg) by mouth every 6 hours if needed for severe pain (7 - 10). Do not start before February 11, 2024. 120 tablet 0    metoprolol succinate XL (Toprol-XL) 50 mg 24 hr tablet TAKE 1 TABLET BY MOUTH DAILY IN THE EVENING 90 tablet 3    naloxone (Narcan) 4 mg/0.1 mL nasal spray USE 1 SPRAY IN ONE NOSTRILONCE AS NEEDED FOR OVERDOSE SYMPTOMS. MAY REPEAT DOSE IF NEEDED IN 2-3 MINUTES IN YOUR OTHER NOSTRIL.      nitroglycerin (Nitrostat) 0.4 mg SL tablet DISSOLVE 1 TABLET UNDER THE " TONGUE AS NEEDED FOR CHEST PAIN- MAY REPEA      pantoprazole (ProtoNix) 40 mg EC tablet TAKE ONE TABLET BY MOUTH DAILY 90 tablet 3      Results for orders placed or performed during the hospital encounter of 03/03/24 (from the past 24 hour(s))   CBC and Auto Differential   Result Value Ref Range    WBC 24.1 (H) 4.4 - 11.3 x10*3/uL    nRBC 0.0 0.0 - 0.0 /100 WBCs    RBC 2.76 (L) 4.50 - 5.90 x10*6/uL    Hemoglobin 8.9 (L) 13.5 - 17.5 g/dL    Hematocrit 29.0 (L) 41.0 - 52.0 %     (H) 80 - 100 fL    MCH 32.2 26.0 - 34.0 pg    MCHC 30.7 (L) 32.0 - 36.0 g/dL    RDW 16.6 (H) 11.5 - 14.5 %    Platelets 107 (L) 150 - 450 x10*3/uL    Neutrophils % 87.4 40.0 - 80.0 %    Immature Granulocytes %, Automated 1.1 (H) 0.0 - 0.9 %    Lymphocytes % 1.8 13.0 - 44.0 %    Monocytes % 9.4 2.0 - 10.0 %    Eosinophils % 0.1 0.0 - 6.0 %    Basophils % 0.2 0.0 - 2.0 %    Neutrophils Absolute 21.04 (H) 1.60 - 5.50 x10*3/uL    Immature Granulocytes Absolute, Automated 0.27 0.00 - 0.50 x10*3/uL    Lymphocytes Absolute 0.44 (L) 0.80 - 3.00 x10*3/uL    Monocytes Absolute 2.27 (H) 0.05 - 0.80 x10*3/uL    Eosinophils Absolute 0.02 0.00 - 0.40 x10*3/uL    Basophils Absolute 0.04 0.00 - 0.10 x10*3/uL   Magnesium   Result Value Ref Range    Magnesium 2.21 1.60 - 2.40 mg/dL   Phosphorus   Result Value Ref Range    Phosphorus 2.4 (L) 2.5 - 4.9 mg/dL   Basic Metabolic Panel   Result Value Ref Range    Glucose 145 (H) 74 - 99 mg/dL    Sodium 143 136 - 145 mmol/L    Potassium 4.3 3.5 - 5.3 mmol/L    Chloride 101 98 - 107 mmol/L    Bicarbonate 40 (HH) 21 - 32 mmol/L    Anion Gap <7 (L) 10 - 20 mmol/L    Urea Nitrogen 22 6 - 23 mg/dL    Creatinine 0.44 (L) 0.50 - 1.30 mg/dL    eGFR >90 >60 mL/min/1.73m*2    Calcium 9.0 8.6 - 10.3 mg/dL   Blood Gas Arterial Full Panel   Result Value Ref Range    POCT pH, Arterial 7.39 7.38 - 7.42 pH    POCT pCO2, Arterial 82 (HH) 38 - 42 mm Hg    POCT pO2, Arterial 120 (H) 85 - 95 mm Hg    POCT SO2, Arterial 99 94 - 100  %    POCT Oxy Hemoglobin, Arterial 97.1 94.0 - 98.0 %    POCT Hematocrit Calculated, Arterial 34.0 (L) 41.0 - 52.0 %    POCT Sodium, Arterial 141 136 - 145 mmol/L    POCT Potassium, Arterial 4.4 3.5 - 5.3 mmol/L    POCT Chloride, Arterial 102 98 - 107 mmol/L    POCT Ionized Calcium, Arterial 1.28 1.10 - 1.33 mmol/L    POCT Glucose, Arterial 114 (H) 74 - 99 mg/dL    POCT Lactate, Arterial 0.5 0.4 - 2.0 mmol/L    POCT Base Excess, Arterial 20.7 (H) -2.0 - 3.0 mmol/L    POCT HCO3 Calculated, Arterial 49.6 (H) 22.0 - 26.0 mmol/L    POCT Hemoglobin, Arterial 11.3 (L) 13.5 - 17.5 g/dL    POCT Anion Gap, Arterial -6 (L) 10 - 25 mmo/L    Patient Temperature      FiO2 40 %    Ventilator Mode BiPAP     Ipap CMH2O 20.0 cm H2O    Epap CMH2O 5.0 cm H2O    Critical Called By LEANN     Critical Called To APRIL FOFANA     Critical Call Time [phone]     Critical Read Back Y     Site of Arterial Puncture Radial Right     Dejan's Test Positive    Urinalysis with Reflex Microscopic   Result Value Ref Range    Color, Urine Yellow Straw, Yellow    Appearance, Urine Hazy (N) Clear    Specific Gravity, Urine 1.019 1.005 - 1.035    pH, Urine 6.0 5.0, 5.5, 6.0, 6.5, 7.0, 7.5, 8.0    Protein, Urine 100 (2+) (N) NEGATIVE mg/dL    Glucose, Urine NEGATIVE NEGATIVE mg/dL    Blood, Urine SMALL (1+) (A) NEGATIVE    Ketones, Urine 5 (TRACE) (A) NEGATIVE mg/dL    Bilirubin, Urine NEGATIVE NEGATIVE    Urobilinogen, Urine 4.0 (N) <2.0 mg/dL    Nitrite, Urine NEGATIVE NEGATIVE    Leukocyte Esterase, Urine TRACE (A) NEGATIVE   Microscopic Only, Urine   Result Value Ref Range    WBC, Urine 1-5 1-5, NONE /HPF    RBC, Urine 3-5 NONE, 1-2, 3-5 /HPF    Bacteria, Urine 1+ (A) NONE SEEN /HPF    Mucus, Urine 4+ Reference range not established. /LPF    Hyaline Casts, Urine OCCASIONAL (A) NONE /LPF          Assessment/Plan   Goals of Care  -met with pt's sig other of 35yrs Lucia and his brother Cesar.  Cesar's wife was also present.  There are no surviving parents  "and no children to their knowledge.  He has five siblings but was only able to provide contact information for 3.  Medical updates were provided and explained he is high risk of re-intubation today.  They describe him as \"a man of the streets\".  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  Explained overall poor prognosis, despite this they feel he would want all aggressive measure because even though it will change his quality of life believe \"he'd make the best of it\".  We reviewed [place] surrogacy as well.  They will speak with rest of the siblings and get their input if they are willing to participate or have added input to further aide goals of care.    Cesar [phone] (brother)  Jesusita [phone] (sister)*  Ana [phone] (sister)*  Violet Pace [phone] (Aunt)*  Stefanie*  Floridalma    -I was later able to speak with Jesusita, Ana, and Stefanie which would be majority by phone and all are in agreement with moving forward with comfort measures only.  Jesusita already let Cesar know as well.    Hiram Beck CNP and myself met with pt's significant other and notified her of the siblings wishes.  She was appropriately tearful and upset but in agreement with plan.  She asked that I contact, Violet Pace pt's Aunt who I had another extended conversation with and she is in agreement that comfort care would be in line with pt's wishes as he would not want to live a life dependant on others for care.  His significant other was updated again.  Explained process for comfort care and comfort medications.  Will along time for family to arrive for visitation prior to removal of bipap but will begin comfort medications now.      I spent 75minutes in the professional and overall care of this patient.      Starr Edmond, MICAELA-CNP  "

## 2024-03-11 NOTE — PROGRESS NOTES
"Occupational Therapy                 Therapy Communication Note    Patient Name: Luis Peacock Jr.  MRN: [id number]  Today's Date: 3/11/2024     Discipline: Occupational Therapy    Missed Visit Reason: Missed Visit Reason:  (Attempted to initiate OT eval. Pt stating \"help me\" repeatedly. Became increasingly restless. Difficult to understand pt d/t Bipap and unclear of needs. RN notified. Will reattempt as appropriate.)    Missed Time: Attempt    "

## 2024-03-11 NOTE — PROGRESS NOTES
[facility] Critical Care Medicine       Date:  3/11/2024  Patient:  Luis Peacock Jr.  YOB: 1950  MRN:  [id number]   Admit Date:  3/3/2024  ========================================================================================================    Chief Complaint   Patient presents with    Shortness of Breath     80% in RA, hx of CPOD, smoker, HF. Squad gave 2g Mg, 125 solumedrol and 3 duonebs         History of Present Illness:  Luis Peacock Jr. is a 73 y.o. male, from home with a past medical history of CAD status post PCI, ischemic cardiomyopathy status post ICD, systolic CHF, PVD status post Rt AKA, HTN, HLD, COPD, who presented with worsening shortness of breath after he woke up. He tried to use his home breathing treatment but no improvement. Called EMS and o their arrival he was saturating 80% on RA. They gave him an albuterol which did not improve him so they placed him on 3 DuoNeb treatments, given 125 of IV Solu-Medrol and 2 g of IV magnesium. Pt also is complaining of abdominal pain, and chest discomfort and is asking for relief of his distress. Pt denies LOC, bleeding, nausea, vomiting, diarrhea, constipation, urinary symptoms. Patient is from home and ambulates with crutches and wheelchair, lives with his family       Interval ICU Events:  3/4/2024: Patient transferred to the ICU for worsening hypercapnia and somnolence.    3/5: On BiPAP.  Noted to have melena stool and blood in Cor[place], and hemoglobin dropped 3 g, hypotensive.  Placed on norepinephrine infusion.  Developed profound shock requiring MTP, received 4u PRBC, 4u FFP, 1 pack plt.  Heparin gtt stopped and reversed with Protamine. Norepinephrine infusion.  Intubated for shock and hypercapnic respiratory failure.  EGD with linear trauma esophagus, epi injection done.    3/6: Patient with continued dark red blood in stool.  Continues to require norepinephrine infusion.  Remains intubated with mechanical ventilation.  On propofol fentanyl  for sedation.  Repeat EGD showed for large 4 large angioectasias in the fundus of the stomach with active bleeding, 7 lesions were ablated.    3/7 Overnight patient became hypertensive with BP of 196/94. Patient administered IV hydralazine which resolved HTN. No other acute events overnight. Anticoagulation to be restarted tomorrow and aspirin to be restarted on Saturday.     3/8: No acute events overnight. Intubated with FiO2 30%, PEEP 5. Sedated with precedex and fentanyl. Intermittently follows simple commands. Hypertensive and tachycardic with intervention/ sedation held. VSS, NSR with PAC, HR 60-70s. No s/s of bleeding. Dark/ old bloody drainage with suctioning, H/H stable.     3/9: No acute events overnight.  Failed SBT due to hypotension and tachypnea.  Remains ventilated with 30% FiO2, PEEP 5, sedated with Precedex infusion.  Follow simple commands.  Hemodynamically stable off vasopressors.  Stable laboratory values.  Diurese today with 20 mg Lasix.    3/10: Extubated yesterday afternoon.  Awake, follows simple commands, slightly confused.  Wore BiPAP 20/5, 40% overnight.  Will wean to nasal cannula this morning.  Hemodynamically stable, slightly hypertensive.  Responded well to diuresis, -1.1L yesterday. Lab values unremarkable.    3/11: No significant events overnight.  ABG at 4 AM showed hypercapnia with pCO2 82, acute metabolic compensation with pH 7.39.  Remains on BiPAP this morning.  Remains drowsy and confused.  Hemodynamically stable.  Increasing leukocytosis with WBC 24.1, repeat culture sent.  Overall poor prognosis for significant recovery, consult to palliative care today.    Medical History:  Past Medical History:   Diagnosis Date    Anesthesia of skin     Numbness    Angina, class IV (CMS/[facility])     CHF (congestive heart failure) (CMS/HCC)     Chronic obstructive pulmonary disease with (acute) exacerbation (CMS/[facility]) 12/08/2022    Acute exacerbation of chronic obstructive pulmonary disease (COPD)     Coronary artery disease     Cough, unspecified     Cough    Disorder of prostate, unspecified     Prostate disease    Encounter for general adult medical examination without abnormal findings 12/09/2021    Encounter for Medicare annual wellness exam    Encounter for immunization 10/16/2020    Encounter for immunization    Functional dyspepsia     Indigestion    Hypertension     Ischemic cardiomyopathy     Local infection of the skin and subcutaneous tissue, unspecified 09/08/2020    Staph skin infection    Mixed hyperlipidemia 12/09/2021    Mixed hyperlipidemia    Muscle weakness (generalized)     Localized muscle weakness    Nicotine dependence, cigarettes, uncomplicated 10/16/2020    Heavy cigarette smoker    Other iron deficiency anemias 10/11/2021    Hypochromic erythrocytes    Other spondylosis, cervical region 03/16/2022    Other spondylosis, cervical region    Pain in leg, unspecified 03/16/2022    Leg pain    Personal history of other diseases of the circulatory system     History of cardiac disorder    Personal history of other diseases of the circulatory system     History of hypertension    Personal history of other diseases of the circulatory system 12/09/2021    History of class IV angina pectoris    Personal history of other diseases of the circulatory system 03/16/2022    History of vascular disorder    Personal history of other diseases of the musculoskeletal system and connective tissue     History of arthritis    Personal history of other diseases of the musculoskeletal system and connective tissue 03/16/2022    History of low back pain    Personal history of other diseases of the musculoskeletal system and connective tissue 03/16/2022    History of neck pain    Personal history of other specified conditions     History of nausea    Personal history of other specified conditions     History of chest pain    Personal history of other specified conditions     History of heartburn    Postnasal drip      Post-nasal drip    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 AKA on right side    CORONARY ANGIOPLASTY WITH STENT PLACEMENT  04/27/2017    Cath Placement Of Stent 1    OTHER SURGICAL HISTORY  11/09/2021    Tooth extraction    OTHER SURGICAL HISTORY  11/09/2021    Leg surgery    OTHER SURGICAL HISTORY  11/09/2021    Cardioverter defibrillator insertion    OTHER SURGICAL HISTORY  11/09/2021    Amputation Of Leg Above Knee    OTHER SURGICAL HISTORY  04/03/2019    Cardiac catheterization with stent placement     Medications Prior to Admission   Medication Sig Dispense Refill Last Dose    albuterol 2.5 mg /3 mL (0.083 %) nebulizer solution USE 1 UNIT DOSE EVERY 4-6 HOURS AS NEEDED FOR WHEEZING .   3/2/2024    albuterol 90 mcg/actuation inhaler Inhale 2 puffs by mouth up to every 4 hours as needed for shortness of breath, wheezing, or prior to exercise.   3/2/2024    amLODIPine (Norvasc) 5 mg tablet Take 1 tablet (5 mg) by mouth once daily.   3/2/2024    aspirin 81 mg chewable tablet Chew 1 tablet (81 mg) once daily.   3/2/2024    atorvastatin (Lipitor) 40 mg tablet Take 1 tablet (40 mg) by mouth once daily at bedtime.   3/2/2024    cholecalciferol (Vitamin D-3) 25 MCG (1000 UT) tablet TAKE AS DIRECTED.   3/2/2024    clopidogrel (Plavix) 75 mg tablet Take 1 tablet (75 mg) by mouth once daily.   3/2/2024    furosemide (Lasix) 20 mg tablet Take 1 tablet (20 mg) by mouth once daily.   3/2/2024    gabapentin (Neurontin) 300 mg capsule take 2-4 capsules BY MOUTH THREE TIMES DAILY AS DIRECTED 1080 capsule 3 3/2/2024    lisinopril 5 mg tablet Take 1 tablet (5 mg) by mouth once  daily.   3/2/2024    methadone (Dolophine) 5 mg tablet Take 1 tablet (5 mg) by mouth every 6 hours.   3/2/2024    methadone (Dolophine) 5 mg tablet Take 1 tablet (5 mg) by mouth every 6 hours if needed for severe pain (7 - 10). Do not start before February 11, 2024. 120 tablet 0     metoprolol succinate XL (Toprol-XL) 50 mg 24 hr tablet TAKE 1 TABLET BY MOUTH DAILY IN THE EVENING 90 tablet 3 3/2/2024    naloxone (Narcan) 4 mg/0.1 mL nasal spray USE 1 SPRAY IN ONE NOSTRILONCE AS NEEDED FOR OVERDOSE SYMPTOMS. MAY REPEAT DOSE IF NEEDED IN 2-3 MINUTES IN YOUR OTHER NOSTRIL.   Unknown    nitroglycerin (Nitrostat) 0.4 mg SL tablet DISSOLVE 1 TABLET UNDER THE TONGUE AS NEEDED FOR CHEST PAIN- MAY REPEA   3/2/2024    pantoprazole (ProtoNix) 40 mg EC tablet TAKE ONE TABLET BY MOUTH DAILY 90 tablet 3 3/2/2024     Simvastatin  Social History     Tobacco Use    Smoking status: Every Day     Packs/day: .2     Types: Cigarettes    Smokeless tobacco: Never   Substance Use Topics    Alcohol use: Not Currently    Drug use: Never     Family History   Problem Relation Name Age of Onset    Alzheimer's disease Mother  74    Other (old age) Father  81          Review of Systems:  14 point review of systems was completed and negative except for those specially mentioned in this note    Physical Exam:    Heart Rate:  []   Temp:  [36.2 °C (97.2 °F)-36.9 °C (98.4 °F)]   Resp:  [9-30]   BP: ()/()   SpO2:  [82 %-100 %]     Physical Exam  Vitals reviewed.   Constitutional:       Appearance: He is ill-appearing.   HENT:      Head: Normocephalic and atraumatic.      Right Ear: External ear normal.      Left Ear: External ear normal.      Nose: Nose normal.      Mouth/Throat:      Mouth: Mucous membranes are moist.   Eyes:      Pupils: Pupils are equal, round, and reactive to light.      Comments: Sluggish reaction to light   Cardiovascular:      Rate and Rhythm: Regular rhythm. Tachycardia present.      Pulses: Normal pulses.       Heart sounds: Normal heart sounds. No murmur heard.     No friction rub. No gallop.   Pulmonary:      Effort: No respiratory distress.      Breath sounds: No stridor. Wheezing present. No rales.      Comments: Increased inspiratory effort  Abdominal:      General: Abdomen is flat. Bowel sounds are normal.      Palpations: Abdomen is soft.   Genitourinary:     Penis: Normal.    Musculoskeletal:         General: No swelling.      Cervical back: Normal range of motion and neck supple.      Left lower leg: No edema.      Comments: Right AKA   Skin:     General: Skin is warm and dry.      Capillary Refill: Capillary refill takes less than 2 seconds.   Neurological:      General: No focal deficit present.      Mental Status: He is alert.      Motor: Weakness present.      Comments: Somnolent. Does awaken and answers simple questions but remains slightly confused           Objective:    I have reviewed all medications, laboratory results, and imaging pertinent for today's encounter    Patient resting in bed with eyes opening to voice. Patient able to follow simple commands with extremities moving spontaneously and localizes.     Assessment/Plan:    I am currently managing this critically ill patient for the following problems:    Neuro/Psych/Pain Ctrl/Sedation:  #Acute encephalopathy - Multifactorial in setting of critical illness   #History of chronic pain on outpatient methadone   -- Neurochecks, CAM assessment, delirium precautions  -- Precedex infusion for agitation, RASS goal -1-0, work to titrate off now that patient Is extubated  -- Continue home methadone every 6 hours  -- Can utilize 4mg Morphine for breakthrough pain or significant air hunger    Respiratory/ENT:  #Acute hypercapnic respiratory failure 2/2 COPD exacerbation in the setting of RSV infection   #Acute COPD exacerbation - Resolved  -- Extubated 3/9  -- Completed Solumedrol  -- Duonebs scheduled and PRN  -- BiPAP 20/5 at night and as needed, has  essentially been BiPAP dependent at most times since extubation  -- Wean supplemental O2 for spO2 goal > 88%    Cardiovascular:  #Abdominal AAA with intramural thrombus   #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  #Hx HTN, HLD, CAD, PVD  #Hx HFrEF, Ischemic Cardiomyopathy s/p ICD Placement  -- Shock resolved, off vasopressors, keep MAP >60  -- Per vascular, not a candidate for surgical invention of LLL ischemia at this time given overall illness and chronicity of occlusion  -- Resume home aspirin, per vascular no need for DAPT or full anticoagulation  -- Continue metoprolol and lisinopril with hold parameters, resume home amlodipine when able    GI:  #Acute Upper GI Bleed - Resolved  EGD 3/6 with:  -4 large angioectasias measuring from 4 mm up to 6 mm in the cardia and fundus of the stomach - intervention was performed  -- H/H remains stable, no further bleeding  -- Continue BID PPI    Renal/Volume Status (Intra & Extravascular):  #BPH  Prior KRYSTLE and hyperkalemia now resolved  -- Patient edematous and net + 4L on 3/9  -- Diureses with intermittent 20mg IV lasix, goal -1L  -- Remove mosley, void trial  -- Continue home Silodosin  -- Daily BMP and electrolytes    Endocrine  -- Accuchecks Q6h    Heme/Onc:  #Acute Blood Loss Anemia 2/2 Upper GIB  -- No further GIB noted, resolved  -- No further DAPT or full anticoagulation  -- Daily CBC, transfuse for Hgb goal > 7    Infectious Disease:  #Leukocytosis - Suspected reactive in setting of steroids and critical illness  -- Procal 3/10 pending  -- Repeat blood cultures 3/11 pending  -- Urinalysis straight cath 3/11 pending  -- Monitor off abx for now, follow workup     MSK:  -- PT/ OT when able    Ethics/Code Status:  Full Code  -- Ultimately patient has multiple medical comorbidities with severe COPD, has been BiPAP dependent, has vascular occlusions of the left lower extremity.  His overall prognosis for recovery is baseline status remains grim.  Will have family  meet with palliative care about overall goals of care moving forward.    :  DVT Prophylaxis: heparin subcu  GI Prophylaxis: Protonix BID  Bowel Regimen: none  Diet: enteral feeding  CVC: none  Dee: Remove  Corado: Remove  Restraints: Yes  Dispo: ICU    Critical Care Time:  40 minutes spent in preparing to see patient (I.e. review of medical records), evaluation of diagnostics (I.e. labs, imaging, etc.), documentation, discussing plan of care with patient/ family/ caregiver, and/ or coordination of care with multidisciplinary team. Time does not include completion of procedure time.     Hiram Beck, APRN-CNP  Pulmonary & Critical Care Medicine  [facility]

## 2024-03-11 NOTE — PROGRESS NOTES
"Physical Therapy                 Therapy Communication Note    Patient Name: Luis Peacock Jr.  MRN: [id number]  Today's Date: 3/11/2024 1044    Discipline: Physical Therapy    Missed Visit Reason: Missed Visit Reason:  (Attempted to initiate PT evaluation. Patient moaning and stating over and over \"help Me\" Denies pain. Unclear as to his needs. Presents with increased restlessness . Difficult to understand due to Bipap. RN notified. states he was recently medicated.)    "

## 2024-03-11 NOTE — PROGRESS NOTES
"Nutrition Follow Up Assessment:   Nutrition Assessment         Patient is a 73 y.o. male presenting with COPD      Nutrition History:  Energy Intake:  (NPO)  Food and Nutrient History: RD follow-up. Extubated 3/9. Somnolent and slightly confused. Palliative care consulted for GOC discussion. TF ordered without enteral access in place. Per rounds, possible plan for corpak insertion. If TF continues to be within goals of care, recommend Jevity 1.5 with goal rate of 40 ml/hr, start at goal rate as was previously at goal while intubated. If TF no longer within GOC s/p family meeting, discontinue. If hospice desired, recommend comfort feeds as tolerated.  Food Allergies/Intolerances:  None  GI Symptoms: None  Oral Problems:  unknown. Per ICU rounds, plans for SLP eval once on airvo       Anthropometrics:  Height: 162.6 cm (5' 4\")   Weight: (!) 41.9 kg (92 lb 6 oz)   BMI (Calculated): 15.85           Weight Change %:  Weight History / % Weight Change: Wt down 2.6 kg since last RD assessment, likely r/t fluid fluctuations, has received diuretics    Nutrition Focused Physical Exam Findings:  defer: not appropriate    Edema:  Edema Location: non-pitting BUE edema per nursing assessment  Physical Findings:  Skin: Negative (for pressure injuries)    Nutrition Significant Labs:  BMP Trend:   Results from last 7 days   Lab Units 03/11/24  0409 03/10/24  0409 03/09/24  0345 03/08/24  0329   GLUCOSE mg/dL 145* 141* 88 137*   CALCIUM mg/dL 9.0 9.0 8.8 9.2   SODIUM mmol/L 143 141 140 140   POTASSIUM mmol/L 4.3 4.1 4.3 4.5   CO2 mmol/L 40* 39* 31 33*   CHLORIDE mmol/L 101 99 102 103   BUN mg/dL 22 20 27* 31*   CREATININE mg/dL 0.44* 0.41* 0.45* 0.49*    , Renal Lab Trend:   Results from last 7 days   Lab Units 03/11/24  0409 03/10/24  0409 03/09/24  0345 03/08/24  0329   POTASSIUM mmol/L 4.3 4.1 4.3 4.5   PHOSPHORUS mg/dL 2.4* 2.4* 1.5* 2.8   SODIUM mmol/L 143 141 140 140   MAGNESIUM mg/dL 2.21 1.98 1.81 2.05   EGFR mL/min/1.73m*2 " >90 >90 >90 >90   BUN mg/dL 22 20 27* 31*   CREATININE mg/dL 0.44* 0.41* 0.45* 0.49*        Nutrition Specific Medications:  Reviewed    I/O:   Last BM Date: 03/07/24; Stool Appearance: Loose (03/07/24 1600)    Dietary Orders (From admission, onward)       Start     Ordered    03/11/24 1009  Enteral feeding with NPO NG (nasogastric tube); 40; 50; Water; Tap water; Every 4 hours  Diet effective now        Question Answer Comment   Tube feeding formula: Jevity 1.5    Feeding route: NG (nasogastric tube)    Tube feeding continuous rate (mL/hr): 40    Tube feeding flush (mL): 50    Flush type: Water    Water type: Tap water    Flush frequency: Every 4 hours        03/11/24 1008    03/03/24 1003  May Participate in Room Service  Once        Question:  .  Answer:  Yes    03/03/24 1003                     Estimated Needs:   Total Energy Estimated Needs (kCal): 1480 kCal  Method for Estimating Needs: 1494-3817 kcal (30-35 kcal/kg)  Total Protein Estimated Needs (g): 68 g  Method for Estimating Needs: 51-85g (1.2-2 g/kg)  Total Fluid Estimated Needs (mL): 1480 mL  Method for Estimating Needs: 1 ml/kcal or per MD        Nutrition Diagnosis   Malnutrition Diagnosis  Patient has Malnutrition Diagnosis: No (insufficient data to diagnose though suspect some degree of malnutrition is present given low BMI)    Nutrition Diagnosis  Patient has Nutrition Diagnosis: Yes  Diagnosis Status (1): Ongoing  Nutrition Diagnosis 1: Inadequate oral intake  Related to (1): acute illness  As Evidenced by (1): NPO without enteral access d/t recent pulling of NG       Nutrition Interventions/Recommendations         Nutrition Prescription:  Individualized Nutrition Prescription Provided for : NPO. Jevity 1.5 at 40 ml/hr if continues to be within goals of care.        Nutrition Interventions:   Interventions: Enteral intake  Enteral Intake: Modify composition of enteral nutrition, Modify rate of enteral nutrition, Feeding tube flush  Goal: TF Jevity  1.5 at goal rate of 40 ml/hr continuous via corpak (provides 1440 kcal, 61 g protein, and 730 ml free H2O). 50 ml flushes q4h or per MD.    Collaboration and Referral of Nutrition Care: Collaboration by nutrition professional with other providers  Goal: ICU interdisciplinary rounds; RN    Nutrition Education:      Not appropriate for education    Nutrition Monitoring and Evaluation   Food/Nutrient Related History Monitoring  Monitoring and Evaluation Plan: Enteral and parenteral nutrition intake, Energy intake, Fluid intake  Energy Intake: Estimated energy intake  Criteria: Meets >75% of estimated energy needs  Fluid Intake: Estimated fluid intake  Criteria: Meets >75% of estimated fluid needs  Enteral and Parenteral Nutrition Intake: Enteral nutrition intake  Criteria: Tolerates tube feeds at goal rate    Body Composition/Growth/Weight History  Monitoring and Evaluation Plan: Weight  Weight: Measured weight  Criteria: Maintain stable wt    Biochemical Data, Medical Tests and Procedures  Monitoring and Evaluation Plan: Glucose/endocrine profile, Electrolyte/renal panel  Criteria: WNL  Glucose/Endocrine Profile: Glucose, casual  Criteria: BG within desirable range            Time Spent/Follow-up Reminder:   Time Spent (min): 30 minutes  Last Date of Nutrition Visit: 03/11/24  Nutrition Follow-Up Needed?: 3-8 days

## 2024-03-11 NOTE — PROGRESS NOTES
Physical Therapy                 Therapy Communication Note    Patient Name: Luis Peacock Jr.  MRN: [id number]  Today's Date: 3/11/2024 1948    Discipline: Physical Therapy    Missed Visit Reason: Missed Visit Reason:  (Note palliative care note and patient's family wishes to pursue comfort care. PT appropriate at this time. Will discontinue order)

## 2024-03-11 NOTE — PROGRESS NOTES
[place] CRITICAL CARE SIGNIFICANT EVENT NOTE:    Date:  3/11/2024  Patient:  Luis Peacock Jr.  YOB: 1950  MRN:  [id number]   Admit Date:  3/3/2024  =============================================================================================    Palliative care was consulted for worsening hypoxic respiratory failure despite NIMV.  Overall poor prognosis of recovery for patient due to multiple comorbidities.    Starr Edmond, NP for palliative care was able to get in touch with patient's siblings.  After lengthy discussions of overall poor prognosis, 3/5 siblings came to agreement that patient would not want aggressive care measures at this point, would likely opt for comfort care.  Myself and Starr spoke with patient's significant other Lucia who is in agreement that patient would not want tracheostomy if that meant that he would require long-term mechanical ventilation.  Explained to Lucia that given patient's significant lung disease as well as acute on chronic hypoxic/hypercapnic respiratory failure, the likelihood of him being able to wean from the ventilator is very low.  He would likely require long-term mechanical ventilation as well as LTAC care and he would be in mostly dependent state. Lucia is in agreement with transition to DNR with comfort measures.    CODE STATUS has been changed, will place pain/anxiety regimen per DNRCC order set.  Plan to wean from oxygen this afternoon when family is able to make the bedside.      Hiram Beck, APRN-CNP  Pulmonary & Critical Care Medicine  [facility]

## 2024-03-11 NOTE — PROGRESS NOTES
Extubated over weekend, currently on continuous BiPAP 40%. Therapy unable to work with pt d/t drowsy. Planned for GOC discussion with Palliative Care and pt's family today.

## 2024-03-11 NOTE — PROGRESS NOTES
Speech-Language Pathology                 Therapy Communication Note    Patient Name: Luis Peacock Jr.  MRN: [id number]  Today's Date: 3/11/2024     Discipline: Speech Language Pathology    Missed Visit Reason:  Patient on BiPAP.    Missed Time: Attempt    Comment: Spoke with patient's nurse who indicated that patient is currently dependent on BiPAP and cannot be transitioned to high flow Airvo nasal cannula.  Will attempt swallow evaluation again at later time/date.

## 2024-03-12 NOTE — PROGRESS NOTES
[facility]       Date:  3/12/2024  Patient:  Luis Peacock Jr.  YOB: 1950  MRN:  [id number]   Admit Date:  3/3/2024  ========================================================================================================    Chief Complaint   Patient presents with    Shortness of Breath     80% in RA, hx of CPOD, smoker, HF. Squad gave 2g Mg, 125 solumedrol and 3 duonebs         History of Present Illness:  Luis Peacock Jr. is a 73 y.o. male, from home with a past medical history of CAD status post PCI, ischemic cardiomyopathy status post ICD, systolic CHF, PVD status post Rt AKA, HTN, HLD, COPD, who presented with worsening shortness of breath after he woke up. He tried to use his home breathing treatment but no improvement. Called EMS and o their arrival he was saturating 80% on RA. They gave him an albuterol which did not improve him so they placed him on 3 DuoNeb treatments, given 125 of IV Solu-Medrol and 2 g of IV magnesium. Pt also is complaining of abdominal pain, and chest discomfort and is asking for relief of his distress. Pt denies LOC, bleeding, nausea, vomiting, diarrhea, constipation, urinary symptoms. Patient is from home and ambulates with crutches and wheelchair, lives with his family       Interval ICU Events:  3/4/2024: Patient transferred to the ICU for worsening hypercapnia and somnolence.    3/5: On BiPAP.  Noted to have melena stool and blood in [place], and hemoglobin dropped 3 g, hypotensive.  Placed on norepinephrine infusion.  Developed profound shock requiring MTP, received 4u PRBC, 4u FFP, 1 pack plt.  Heparin gtt stopped and reversed with Protamine. Norepinephrine infusion.  Intubated for shock and hypercapnic respiratory failure.  EGD with linear trauma esophagus, epi injection done.    3/6: Patient with continued dark red blood in stool.  Continues to require norepinephrine infusion.  Remains intubated with mechanical ventilation.  On propofol fentanyl  for sedation.  Repeat EGD showed for large 4 large angioectasias in the fundus of the stomach with active bleeding, 7 lesions were ablated.    3/7 Overnight patient became hypertensive with BP of 196/94. Patient administered IV hydralazine which resolved HTN. No other acute events overnight. Anticoagulation to be restarted tomorrow and aspirin to be restarted on Saturday.     3/8: No acute events overnight. Intubated with FiO2 30%, PEEP 5. Sedated with precedex and fentanyl. Intermittently follows simple commands. Hypertensive and tachycardic with intervention/ sedation held. VSS, NSR with PAC, HR 60-70s. No s/s of bleeding. Dark/ old bloody drainage with suctioning, H/H stable.     3/9: No acute events overnight.  Failed SBT due to hypotension and tachypnea.  Remains ventilated with 30% FiO2, PEEP 5, sedated with Precedex infusion.  Follow simple commands.  Hemodynamically stable off vasopressors.  Stable laboratory values.  Diurese today with 20 mg Lasix.    3/10: Extubated yesterday afternoon.  Awake, follows simple commands, slightly confused.  Wore BiPAP 20/5, 40% overnight.  Will wean to nasal cannula this morning.  Hemodynamically stable, slightly hypertensive.  Responded well to diuresis, -1.1L yesterday. Lab values unremarkable.    3/11: No significant events overnight.  ABG at 4 AM showed hypercapnia with pCO2 82, acute metabolic compensation with pH 7.39.  Remains on BiPAP this morning.  Remains drowsy and confused.  Hemodynamically stable.  Increasing leukocytosis with WBC 24.1, repeat culture sent.  Overall poor prognosis for significant recovery, consult to palliative care today.  3/12: On O2 via nasal cannula maintaining SPO2 mid to upper 90s. VSS. Open eyes but does not follow commands. Continue GOC discussion with family per palliative care. Will plan for hospice    Medical History:  Past Medical History:   Diagnosis Date    Anesthesia of skin     Numbness    Angina, class IV (CMS/HCC)     CHF  (congestive heart failure) (CMS/[facility])     Chronic obstructive pulmonary disease with (acute) exacerbation (CMS/[facility]) 12/08/2022    Acute exacerbation of chronic obstructive pulmonary disease (COPD)    Coronary artery disease     Cough, unspecified     Cough    Disorder of prostate, unspecified     Prostate disease    Encounter for general adult medical examination without abnormal findings 12/09/2021    Encounter for Medicare annual wellness exam    Encounter for immunization 10/16/2020    Encounter for immunization    Functional dyspepsia     Indigestion    Hypertension     Ischemic cardiomyopathy     Local infection of the skin and subcutaneous tissue, unspecified 09/08/2020    Staph skin infection    Mixed hyperlipidemia 12/09/2021    Mixed hyperlipidemia    Muscle weakness (generalized)     Localized muscle weakness    Nicotine dependence, cigarettes, uncomplicated 10/16/2020    Heavy cigarette smoker    Other iron deficiency anemias 10/11/2021    Hypochromic erythrocytes    Other spondylosis, cervical region 03/16/2022    Other spondylosis, cervical region    Pain in leg, unspecified 03/16/2022    Leg pain    Personal history of other diseases of the circulatory system     History of cardiac disorder    Personal history of other diseases of the circulatory system     History of hypertension    Personal history of other diseases of the circulatory system 12/09/2021    History of class IV angina pectoris    Personal history of other diseases of the circulatory system 03/16/2022    History of vascular disorder    Personal history of other diseases of the musculoskeletal system and connective tissue     History of arthritis    Personal history of other diseases of the musculoskeletal system and connective tissue 03/16/2022    History of low back pain    Personal history of other diseases of the musculoskeletal system and connective tissue 03/16/2022    History of neck pain    Personal history of other specified  conditions     History of nausea    Personal history of other specified conditions     History of chest pain    Personal history of other specified conditions     History of heartburn    Postnasal drip     Post-nasal drip    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 AKA on right side    CORONARY ANGIOPLASTY WITH STENT PLACEMENT  04/27/2017    Cath Placement Of Stent 1    OTHER SURGICAL HISTORY  11/09/2021    Tooth extraction    OTHER SURGICAL HISTORY  11/09/2021    Leg surgery    OTHER SURGICAL HISTORY  11/09/2021    Cardioverter defibrillator insertion    OTHER SURGICAL HISTORY  11/09/2021    Amputation Of Leg Above Knee    OTHER SURGICAL HISTORY  04/03/2019    Cardiac catheterization with stent placement     Medications Prior to Admission   Medication Sig Dispense Refill Last Dose    albuterol 2.5 mg /3 mL (0.083 %) nebulizer solution USE 1 UNIT DOSE EVERY 4-6 HOURS AS NEEDED FOR WHEEZING .   3/2/2024    albuterol 90 mcg/actuation inhaler Inhale 2 puffs by mouth up to every 4 hours as needed for shortness of breath, wheezing, or prior to exercise.   3/2/2024    amLODIPine (Norvasc) 5 mg tablet Take 1 tablet (5 mg) by mouth once daily.   3/2/2024    aspirin 81 mg chewable tablet Chew 1 tablet (81 mg) once daily.   3/2/2024    atorvastatin (Lipitor) 40 mg tablet Take 1 tablet (40 mg) by mouth once daily at bedtime.   3/2/2024    cholecalciferol (Vitamin D-3) 25 MCG (1000 UT) tablet TAKE AS DIRECTED.   3/2/2024    clopidogrel (Plavix) 75 mg tablet Take 1 tablet (75 mg) by mouth once daily.   3/2/2024    furosemide (Lasix) 20 mg tablet Take 1 tablet (20 mg) by mouth once  daily.   3/2/2024    gabapentin (Neurontin) 300 mg capsule take 2-4 capsules BY MOUTH THREE TIMES DAILY AS DIRECTED 1080 capsule 3 3/2/2024    lisinopril 5 mg tablet Take 1 tablet (5 mg) by mouth once daily.   3/2/2024    methadone (Dolophine) 5 mg tablet Take 1 tablet (5 mg) by mouth every 6 hours.   3/2/2024    methadone (Dolophine) 5 mg tablet Take 1 tablet (5 mg) by mouth every 6 hours if needed for severe pain (7 - 10). Do not start before February 11, 2024. 120 tablet 0     metoprolol succinate XL (Toprol-XL) 50 mg 24 hr tablet TAKE 1 TABLET BY MOUTH DAILY IN THE EVENING 90 tablet 3 3/2/2024    naloxone (Narcan) 4 mg/0.1 mL nasal spray USE 1 SPRAY IN ONE NOSTRILONCE AS NEEDED FOR OVERDOSE SYMPTOMS. MAY REPEAT DOSE IF NEEDED IN 2-3 MINUTES IN YOUR OTHER NOSTRIL.   Unknown    nitroglycerin (Nitrostat) 0.4 mg SL tablet DISSOLVE 1 TABLET UNDER THE TONGUE AS NEEDED FOR CHEST PAIN- MAY REPEA   3/2/2024    pantoprazole (ProtoNix) 40 mg EC tablet TAKE ONE TABLET BY MOUTH DAILY 90 tablet 3 3/2/2024     Simvastatin  Social History     Tobacco Use    Smoking status: Every Day     Packs/day: .2     Types: Cigarettes    Smokeless tobacco: Never   Substance Use Topics    Alcohol use: Not Currently    Drug use: Never     Family History   Problem Relation Name Age of Onset    Alzheimer's disease Mother  74    Other (old age) Father  81          Review of Systems:  14 point review of systems was completed and negative except for those specially mentioned in this note    Physical Exam:    Heart Rate:  []   Temp:  [36.6 °C (97.9 °F)-36.8 °C (98.2 °F)]   Resp:  [11-34]   BP: ()/(34-89)   SpO2:  [83 %-100 %]     Physical Exam  Vitals reviewed.   Constitutional:       Appearance: He is ill-appearing.   HENT:      Head: Normocephalic and atraumatic.      Right Ear: External ear normal.      Left Ear: External ear normal.      Nose: Nose normal.      Mouth/Throat:      Mouth: Mucous membranes are moist.   Eyes:       Pupils: Pupils are equal, round, and reactive to light.      Comments: Sluggish reaction to light   Cardiovascular:      Rate and Rhythm: Regular rhythm. Tachycardia present.      Pulses: Normal pulses.      Heart sounds: Normal heart sounds. No murmur heard.     No friction rub. No gallop.   Pulmonary:      Effort: No respiratory distress.      Breath sounds: No stridor. Wheezing present. No rales.      Comments: Increased inspiratory effort  Abdominal:      General: Abdomen is flat. Bowel sounds are normal.      Palpations: Abdomen is soft.   Genitourinary:     Penis: Normal.    Musculoskeletal:         General: No swelling.      Cervical back: Normal range of motion and neck supple.      Left lower leg: No edema.      Comments: Right AKA   Skin:     General: Skin is warm and dry.      Capillary Refill: Capillary refill takes less than 2 seconds.   Neurological:      General: No focal deficit present.      Mental Status: He is alert.      Motor: Weakness present.      Comments: Somnolent. Opens eyes but does not follow commands           Objective:    I have reviewed all medications, laboratory results, and imaging pertinent for today's encounter    Patient resting in bed with eyes opening to voice. Patient able to follow simple commands with extremities moving spontaneously and localizes.     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  #Hx HTN, HLD, CAD, PVD  #Hx HFrEF, Ischemic Cardiomyopathy s/p ICD Placement  -- Shock resolved, off vasopressors  -- Per vascular, not a candidate for surgical invention of LLL ischemia at this time given overall illness and chronicity of occlusion  --DNRCC, plan for hospice, home medications discontinued    GI:  #Acute Upper GI Bleed - Resolved  EGD 3/6 with:  -4 large angioectasias measuring from 4 mm up to 6 mm in the cardia and fundus of the stomach - intervention was performed    Renal/Volume Status (Intra & Extravascular):  #BPH  Prior KRYSTLE and hyperkalemia now resolved    Endocrine  No acute concerns  May have diet as desires    Heme/Onc:  #Acute Blood Loss Anemia 2/2 Upper GIB  -- No further GIB noted, resolved  -- No further DAPT or full anticoagulation    Infectious Disease:  #Leukocytosis - Suspected reactive in setting of steroids and critical illness    MSK:  Turn/ reposition for comfort    Ethics/Code Status:  DNRCC  -- Ultimately patient has multiple medical comorbidities with severe COPD, has been BiPAP dependent, has vascular occlusions of the left lower extremity.  His overall prognosis for recovery is baseline status remains grim.  Family met with palliative care and decided to transition to comfort care.  --Hospice consult    :  Dispo: transfer    Plan discussed with Dr. Emily Dent, APRN-CNP  Pulmonary & Critical Care Medicine  [facility]

## 2024-03-12 NOTE — PROGRESS NOTES
Social Work Note Per interdisciplinary rounds, code status changed to DNRCC yesterday and care was withdrawn.  [facility] continues to follow and team should anticipate consult for hospice today.  Later rcvd order for hospice consult and spoke with Starr Edmond from Palliative Care.  Pt has no HCPOA in place and legal NOK has been identified as pt's five siblings.  Placed phone call to pt's sister Jesusita who then added multi-way calls for sisters Ana and Isela.  Explained hospice services and insurance coverage for hospice.  Explained provider options and offered freedom of choice.  Per family, they prefer [facility] hospice and request referral to [facility] with Ana as point person for scheduling ([phone]).  Completed and submitted referral for [facility] in [facility].  Later vd msg from [facility] that Ana has asked [facility] to contact her brother Cesar for scheduling instead ([phone]) and [facility] left a msg for Cesar.  I also attempted to call Cesar but got his voicemail.  DAPHNIE Leon

## 2024-03-12 NOTE — PROGRESS NOTES
Occupational Therapy                 Therapy Communication Note    Patient Name: Luis Peacock Jr.  MRN: [id number]  Today's Date: 3/12/2024     Discipline: Occupational Therapy    Missed Visit Reason: Missed Visit Reason:  (D/C OT. Comfort care, DNR)    Comment: pt. And family pursing comfort care. Not appropriate for skilled OT services at this time. Will D.C. OT orders.

## 2024-03-12 NOTE — PROGRESS NOTES
"Luis Peacock Jr. is a 73 y.o. male on day 9 of admission presenting with COPD (chronic obstructive pulmonary disease) (CMS/[facility]).    Subjective   Does not follow commands.  Eyes open at times.        Objective     Physical Exam  GEN: ill appearing but appears more comfortable than yesterday  HEENT: MMM  CV: tachycardic   PULM: even and at times labored  ABD: soft, +BS  : normal  MSK: right AKA  SKIN: no rashes  NEURO: not alert and oriented, opens eyes but no response; somnolent    Last Recorded Vitals  Blood pressure 178/67, pulse 110, temperature 36.4 °C (97.5 °F), temperature source Temporal, resp. rate 17, height 1.626 m (5' 4\"), weight (!) 41.9 kg (92 lb 6 oz), SpO2 96 %.  Intake/Output last 3 Shifts:  I/O last 3 completed shifts:  In: 604 (14.4 mL/kg) [NG/GT:354; IV Piggyback:250]  Out: 650 (15.5 mL/kg) [Urine:650 (0.4 mL/kg/hr)]  Weight: 41.9 kg     Relevant Results  Scheduled medications     Continuous medications     PRN medications  PRN medications: fentaNYL PF, fentaNYL PF, fentaNYL PF, glycopyrrolate, haloperidol lactate, ipratropium-albuteroL, LORazepam, oxygen     No results found for this or any previous visit (from the past 24 hour(s)).    Assessment/Plan    -spent an extensive amount of time with pt's significant other at bedside explaining in great detail chain of events yesterday.  She is quite upset, she feels that she was excluded from decision making.  Apologized but reiterated chain of events and decisions that were made and communicated with her last evening by myself and Hiram Beck CNP.  She admitted sisters were trying to call her to all speak together regarding plan of care for Luis.  Spent time explaining next steps and encouraged her involvement.  Pt's sister contacted her while I was in the room.  I encouraged her to answer so we could all speak together but she declined.  Discussed hospice referral with her and pt's sister seperately by phone.    -SW notified of hospice " The patient is a 23y Male complaining of right hand pain and chipped front tooth referral.  Met with family again after notified brother was here to sign consents for hospice.  No meeting time has been established as they've been unable to reach family to schedule meeting.    I spent 90 minutes in the professional and overall care of this patient.      Starr Edmond, APRN-CNP

## 2024-03-12 NOTE — CARE PLAN
The patient's goals for the shift include      The clinical goals for the shift include Patient will maintain spO2 >92% and report decreased work of breathing on airvo by end of shift.

## 2024-03-13 NOTE — PROGRESS NOTES
"Luis Peacock Jr. is a 73 y.o. male on day 10 of admission presenting with COPD (chronic obstructive pulmonary disease) (CMS/[facility]).    Subjective   Unable to obtain ROS  Objective     Physical Exam  GEN: unresponsive, audible secretions  RESP: even, unlabored  ABD: soft  EXT: Right AKA  SKIN: no rashes    Last Recorded Vitals  Blood pressure 167/90, pulse 104, temperature 37.2 °C (99 °F), temperature source Temporal, resp. rate 22, height 1.626 m (5' 4\"), weight (!) 41.9 kg (92 lb 6 oz), SpO2 99 %.  Intake/Output last 3 Shifts:  I/O last 3 completed shifts:  In: 0 (0 mL/kg)   Out: 1685 (40.2 mL/kg) [Urine:1685 (1.1 mL/kg/hr)]  Weight: 41.9 kg     Relevant Results  Scheduled medications  glycopyrrolate, 0.2 mg, intravenous, q4h      Continuous medications  sodium chloride 0.9%, 50 mL/hr, Last Rate: 50 mL/hr (03/13/24 1324)      PRN medications  PRN medications: haloperidol lactate, ipratropium-albuteroL, LORazepam, morphine, morphine, morphine, oxygen     No results found for this or any previous visit (from the past 24 hour(s)).   Assessment/Plan   -family signed hospice consents earlier today.  Unable to given fentanyl on the GMF.  Received morphine overnight.  Added morphine q15min pain/RDOS  -schedule robinul  -supportive visit with sig other at bedside.     -will likely sign off now that pt's in GIP status with [facility]    I spent 35minutes in the professional and overall care of this patient.      Starr Edmond, APRN-CNP      "

## 2024-03-13 NOTE — NURSING NOTE
RN Hospice Note    Luis Peacock Jr. is a Hospice Patient.   Hospice terminal diagnosis: COPD  Physician: Dr Rodas  Visit type: GIP Admission    Comments/recommendations: Meeting held at the bedside with pts significant other Lucia,  Brother Cesar, and Sister Ana who was on speaker phone.  Patient is lethargic, no responding and unable to participate.  Discussed goals of care based on prior discussions with the  hospital team.  Family states at this time they want to shift the patient to comfort measures only with hospice support.  They are aware of the pts poor prognosis, also stating they are hopefull pt can make some type of recovery.  Presented scope of hospice [facility] level of care services.  The sister Ana requested the brother sign consents as she is not present in the room.    Discharge Planning:  Discussed the short nature of the GIP level of care and the need for a discharge plan once symptoms are managed.  Lucia the Significant Other indicated she may have him return home under her care      Plan of care reviewed with hospital staff members: Dr Rodas, Starr Edmond APRN, CNP     Please notify [facility] of any changes in condition. Thank you.  Office: [phone] (8 am-6:30 pm M-F and 8 am-4:30 pm weekends and holidays)   [phone] (6:30 pm-8 am M-F and 4:30 pm-8 am weekends and holidays)    Facundo Moses RN

## 2024-03-13 NOTE — NURSING NOTE
IV fluids started as ordered, pt slightly agitated, IV ativan administered as ordered. Mouth care done. Pt made comfortable. Family at bedside with pt.

## 2024-03-13 NOTE — NURSING NOTE
Patient checked on by this nurse, breathing is less labored and patient appears to be sleeping. O2 on. Will continue to monitor.

## 2024-03-13 NOTE — DISCHARGE SUMMARY
Discharge Diagnosis  COPD (chronic obstructive pulmonary disease) (CMS/[facility]), severe anemia in the setting of acute blood loss and peptic ulcer disease, acute hypercapnic respiratory failure    Issues Requiring Follow-Up  Hospice GIP    Discharge Meds     Your medication list        START taking these medications        Instructions Last Dose Given Next Dose Due   glycopyrrolate 200 mcg/mL injection  Commonly known as: Robinul      Infuse 1 mL (0.2 mg) into a venous catheter every 4 hours if needed (excess secretions).       haloperidol lactate 5 mg/mL injection  Commonly known as: Haldol      Infuse 0.2 mL (1 mg) into a venous catheter every 4 hours if needed for agitation (delirium).       ipratropium-albuteroL 0.5-2.5 mg/3 mL nebulizer solution  Commonly known as: Duo-Neb      Take 3 mL by nebulization every 4 hours if needed for wheezing or shortness of breath.       LORazepam 2 mg/mL injection  Commonly known as: Ativan      Infuse 0.25 mL (0.5 mg) over 5 minutes into a venous catheter every 1 hour if needed for anxiety (anxiety, restlessness, insomnia).       morphine 2 mg/mL injection      Infuse 1 mL (2 mg) into a venous catheter every 4 hours if needed for severe pain (7 - 10) or moderate pain (4 - 6).       oxygen gas therapy  Commonly known as: O2      Inhale 1 each once every 24 hours.              CHANGE how you take these medications        Instructions Last Dose Given Next Dose Due   methadone 5 mg tablet  Commonly known as: Dolophine  What changed: Another medication with the same name was removed. Continue taking this medication, and follow the directions you see here.                  CONTINUE taking these medications        Instructions Last Dose Given Next Dose Due   albuterol 2.5 mg /3 mL (0.083 %) nebulizer solution           albuterol 90 mcg/actuation inhaler           amLODIPine 5 mg tablet  Commonly known as: Norvasc           aspirin 81 mg chewable tablet           atorvastatin 40 mg  tablet  Commonly known as: Lipitor           cholecalciferol 25 MCG (1000 UT) tablet  Commonly known as: Vitamin D-3           clopidogrel 75 mg tablet  Commonly known as: Plavix           furosemide 20 mg tablet  Commonly known as: Lasix           gabapentin 300 mg capsule  Commonly known as: Neurontin      take 2-4 capsules BY MOUTH THREE TIMES DAILY AS DIRECTED       lisinopril 5 mg tablet           metoprolol succinate XL 50 mg 24 hr tablet  Commonly known as: Toprol-XL      TAKE 1 TABLET BY MOUTH DAILY IN THE EVENING       Narcan 4 mg/0.1 mL nasal spray  Generic drug: naloxone           nitroglycerin 0.4 mg SL tablet  Commonly known as: Nitrostat           pantoprazole 40 mg EC tablet  Commonly known as: ProtoNix      TAKE ONE TABLET BY MOUTH DAILY                 Where to Get Your Medications        These medications were sent to [facility] # - [place], [place] - [address] [address]      Phone: [phone]   ipratropium-albuteroL 0.5-2.5 mg/3 mL nebulizer solution       Information about where to get these medications is not yet available    Ask your nurse or doctor about these medications  glycopyrrolate 200 mcg/mL injection  haloperidol lactate 5 mg/mL injection  LORazepam 2 mg/mL injection  morphine 2 mg/mL injection  oxygen gas therapy         Test Results Pending At Discharge  Pending Labs       Order Current Status    Respiratory Viral Panel In process    Blood Culture Preliminary result    Blood Culture Preliminary result            Hospital Course  #Acute encephalopathy - Multifactorial in setting of critical illness   #History of chronic pain on outpatient methadone   #Acute hypercapnic respiratory failure 2/2 COPD exacerbation in the setting of RSV infection   #Acute COPD exacerbation resolved  #Abdominal AAA with intramural thrombus   #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 is a 73 y.o. male, from home with a past medical history of CAD status post PCI, ischemic cardiomyopathy status post ICD, systolic CHF, PVD status post KRYSTLE, HTN, HLD, COPD, who was initially admitted on the regular floor for management of acute hypoxic respiratory failure in the setting of COPD exacerbation due to RSV upper respiratory tract infection.  Patient was also complaining of abdominal pain.    Floor course:  Patient was started on Droplet precaution, telemetry, oxygen, pain management, aspiration precaution, Solu-Medrol, DuoNeb, Tylenol, Morphine, Protonix, antiemetic   CT abdomen pelvis was done which was positive for abdominal aortic aneurysm with thrombosis and left-sided superficial abdominal rectus hematoma. Pt was started on heparin infusion, vascular surgery was consulted and recommended no intervention. General surgery consulted for abdominal wall hematoma, recommended no intervention.  Pacemaker firing rate. Device interrogation revealed pacer sense, capture and impedances WNL. No ventricular arrhythmias noted. No AT/AF noted.     On 3/4/24 at 12:44 pm pt was more lethargic, placed stat CT head, ammonia, TSH, repeat blood culture and UA with urine culture, ABG, urine tox. I will also consult ICU to evaluate the pt. afterwards pt was transferred to the ICU for higher level of care.  ABG was consistent with hypercapnic respiratory acidosis.  Initially patient was started on BiPAP.  In the ICU patient developed GI bleeding and hyperkalemia.  GI was consulted and patient made n.p.o. and underwent EGD.  Heparin reversed with protamine.  EGD with linear trauma esophagus, epi injection done.  Meanwhile patient was a started on norepinephrine infusion. Repeat EGD showed for large 4 large angioectasias in the fundus of the stomach with active bleeding, 7 lesions were ablated. Patient was hypertensive, administered IV hydralazine which  resolved hypertension.  On 3/8 pt got intubated, remained hypertensive and tachycardic.  On 3/9 patient failed SBT due to hypotension and tachypnea, continued on sedation with Precedex infusion.  On 3/10/2024 patient was extubated and placed on oxygen and BiPAP.  Was following simple commands.  Remained drowsy and confused.  Hemodynamically stable.  Patient continued to have leukocytosis.  Overall patient was poor prognosis and palliative was consulted.  They had a meeting with the family and they decided to go with hospice care.  Hospice meeting happened today on 3/13/24 and patient is ready for discharge to hospice [facility].  He will be started on hospice level of care and hospice meds.  Per family request I will also start the patient on a lower rate of IV fluid hydration.  Patient is not able to take anything per oral.  He is currently on oxygen via nasal cannula.  He is disoriented.  Family at bedside and all questions were answered.     Pertinent Physical Exam At Time of Discharge  Physical Exam    General: Well-developed thin male, on NC, disoriented, ill-appearing  HEENT: AT, NC, no JVD, no lymphadenopathy, neck supple  Lungs: Generalized wheezing and coarse breath sounds  Cardiac: Tachycardic, no murmur, no gallop  Abdomen: Soft, no distention, diminished bowel sound  Extremities: Right AKA, no edema, pulses intact  Neurological: Disoriented    Outpatient Follow-Up  Future Appointments   Date Time Provider Department Center   4/16/2024  9:30 AM Zen Suarez MD [id number] [place]   5/15/2024  9:00 AM [facility] CT 2 [facility]   8/7/2024 10:45 AM Luis Fernando Pearson MD [id number] [place]       Time spent 45 minutes   Warner Rodas MD

## 2024-03-14 NOTE — NURSING NOTE
RN Hospice Note    Luis Peacock Jr. is a Hospice Patient.   Hospice terminal diagnosis: copd   Physician: Dr. Rodas  Visit type: Routine - follow up GIP - DAY 2    Comments/recommendations: This RN present for follow up visit to pt.  Pt minimally responsive, eyes open at times, no focus tracking observed, slightly restless in bed.  Sx's of terminal decline/restlessness.  Pt observed with new tremor/twitching to left jaw, sig other Lucia, at bedside, reports she has noted on and off today since she arrived.  Pt has ivf's infusing, observed swelling to left forearm/hand.  Much education provided today re MELANIE at EOL, Lucia also accepting of jose's path booklet to review sx's of imminent death.  Updated hwr clin mgr griselda Holt, agreed remains appropriate for GIP.  oscar Garcia care cnp, present on unit, updated.  Epic chat message to dr. Rodas with assessment findings. Phone call update to pts sister, Ana, left vm update with hwr contact number to call back.  [facility] to follow up with daily visits. Thanks for the collaborative care of this pt and family.      Plan of care reviewed with patient/family members Lucia, sig other/Ana, sister (vm left)   Plan of care reviewed with hospital staff members: Dr. Rodas/ROSALBA Edmond-pall care lisbet/JANEEN Otto/LEXII Marie/FAVIO Martinez/CLARICE Butler and Laz Morales RN's     Please notify [facility] of any changes in condition. Thank you.  Office: [phone] (8 am-6:30 pm M-F and 8 am-4:30 pm weekends and holidays)   [phone] (6:30 pm-8 am M-F and 4:30 pm-8 am weekends and holidays)    Britt Sofia, FAVIO

## 2024-03-14 NOTE — NURSING NOTE
Jesusita Souza contacted by this nurse regarding patient's comfort management medications as they are showing up on MAR as read only. Awaiting response.

## 2024-03-14 NOTE — PROGRESS NOTES
Luis Peacock Jr. is a 73 y.o. male on day 1 of admission presenting with No Principal Problem: There is no principal problem currently on the Problem List. Please update the Problem List and refresh..      Subjective   Patient is currently stable  Patient is hospice GIP    Objective     Last Recorded Vitals  /79   Pulse 96   Temp 36.9 °C (98.4 °F)   Resp 20   SpO2 98%   Intake/Output last 3 Shifts:    Intake/Output Summary (Last 24 hours) at 3/14/2024 1237  Last data filed at 3/14/2024 0600  Gross per 24 hour   Intake 816.83 ml   Output 1600 ml   Net -783.17 ml       Admission Weight       Daily Weight  03/10/24 : (!) 41.9 kg (92 lb 6 oz)    Image Results  XR chest 1 view  Narrative: Interpreted By:  Schoenberger, Joseph,   STUDY:  XR CHEST 1 VIEW;  3/10/2024 7:35 am      INDICATION:  Signs/Symptoms:NG advancement.      COMPARISON:  03/10/2024      ACCESSION NUMBER(S):  [id number]      ORDERING CLINICIAN:  AMMY EJSUS      FINDINGS:  There is a left subclavian venous pacemaker and nasogastric tube  unchanged. The nasogastric tube is in similar position radiolucent  side marker level of GE junction with tip of catheter gastric fundus.              CARDIOMEDIASTINAL SILHOUETTE:  Cardiomediastinal silhouette is normal in size and configuration.      LUNGS:  The lungs are hyperinflated with stretching of the vasculature and  coarsening of the peribronchial markings suggesting emphysema/COPD.      ABDOMEN:  No remarkable upper abdominal findings.      BONES:  No acute osseous changes.      Impression: 1.  See discussion above. No significant change from prior.              MACRO:  None      Signed by: Joseph Schoenberger 3/11/2024 6:57 AM  Dictation workstation:   [id number]  XR chest 1 view  Narrative: Interpreted By:  Schoenberger, Joseph,   STUDY:  XR CHEST 1 VIEW;  3/10/2024 5:17 am      INDICATION:  Signs/Symptoms:Evaluate pulmonary edema.      COMPARISON:  03/07/2024      ACCESSION  NUMBER(S):  [id number]      ORDERING CLINICIAN:  AMMY JESUS      FINDINGS:  The endotracheal tube is been removed. Nasogastric tube and pacemaker  unchanged.              CARDIOMEDIASTINAL SILHOUETTE:  Cardiac silhouette remains normal in size.      LUNGS:  The lungs are hyperinflated with stretching of the vasculature and  coarsening of the peribronchial markings suggesting emphysema/COPD.  No new focal lung opacities are evident.      ABDOMEN:  No remarkable upper abdominal findings.      BONES:  No acute osseous changes.      Impression: 1.  Extubation otherwise no change from prior              MACRO:  None      Signed by: Joseph Schoenberger 3/11/2024 6:53 AM  Dictation workstation:   [id number]      Physical Exam    General: Well-developed thin male, on NC, disoriented, ill-appearing  HEENT: AT, NC, no JVD, no lymphadenopathy, neck supple  Lungs: Generalized coarse breath sounds  Cardiac: Normal S1-S2, no murmur, no gallop  Abdomen: Soft, no distention, diminished bowel sound  Extremities: Right AKA, no edema, pulses intact  Neurological: Disoriented    Assessment/Plan   Active Problems:  There are no active Hospital Problems.    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 male, from home with a past medical history of CAD status post PCI, ischemic cardiomyopathy status post ICD, systolic CHF, PVD status post KRYSTLE, HTN, HLD, COPD, who was initially admitted on the regular floor for management of acute hypoxic respiratory failure in the setting of COPD  exacerbation due to RSV upper respiratory tract infection.  Patient was also complaining of abdominal pain.     Floor course:  Patient was started on Droplet precaution, telemetry, oxygen, pain management, aspiration precaution, Solu-Medrol, DuoNeb, Tylenol, Morphine, Protonix, antiemetic   CT abdomen pelvis was done which was positive for abdominal aortic aneurysm with thrombosis and left-sided superficial abdominal rectus hematoma. Pt was started on heparin infusion, vascular surgery was consulted and recommended no intervention. General surgery consulted for abdominal wall hematoma, recommended no intervention.  Pacemaker firing rate. Device interrogation revealed pacer sense, capture and impedances WNL. No ventricular arrhythmias noted. No AT/AF noted.      On 3/4/24 at 12:44 pm pt was more lethargic, placed stat CT head, ammonia, TSH, repeat blood culture and UA with urine culture, ABG, urine tox. I will also consult ICU to evaluate the pt. afterwards pt was transferred to the ICU for higher level of care.  ABG was consistent with hypercapnic respiratory acidosis.  Initially patient was started on BiPAP.  In the ICU patient developed GI bleeding and hyperkalemia.  GI was consulted and patient made n.p.o. and underwent EGD.  Heparin reversed with protamine.  EGD with linear trauma esophagus, epi injection done.  Meanwhile patient was a started on norepinephrine infusion. Repeat EGD showed for large 4 large angioectasias in the fundus of the stomach with active bleeding, 7 lesions were ablated. Patient was hypertensive, administered IV hydralazine which resolved hypertension.  On 3/8 pt got intubated, remained hypertensive and tachycardic.  On 3/9 patient failed SBT due to hypotension and tachypnea, continued on sedation with Precedex infusion.  On 3/10/2024 patient was extubated and placed on oxygen and BiPAP.  Was following simple commands.  Remained drowsy and confused.  Hemodynamically stable.  Patient  continued to have leukocytosis.  Overall patient was poor prognosis and palliative was consulted.  They had a meeting with the family and they decided to go with hospice care.  Hospice meeting happened today on 3/13/24 and patient is ready for discharge to hospice [facility].  He will be started on hospice level of care and hospice meds.  Per family request I will also start the patient on a lower rate of IV fluid hydration.  Patient is not able to take anything per oral.  He is currently on oxygen via nasal cannula.  He is disoriented.  Family at bedside and all questions were answered.       3/14/2024:  Patient is hospice GIP, on hospice meds, no need for any aggressive management or blood draw    Warner Rodas MD

## 2024-03-14 NOTE — CONSULTS
Inpatient consult to Palliative Care  Consult performed by: Starr Edmond, MICAELA-CNP  Consult ordered by: Warner Rodas MD          Reason For Consult  Continuity of care     History Of Present Illness  Luis Peacock Jr. is a 73 y.o. male now enrolled in [facility] hospice.  Palliative care reconsulted after discussion with [facility] RN.      ROS: unable to obtain    Personal/Social History  He reports that he has been smoking cigarettes. He has been smoking an average of .2 packs per day. He has never used smokeless tobacco. He reports that he does not currently use alcohol. He reports that he does not use drugs.    Past Medical History  He has a past medical history of Anesthesia of skin, Angina, class IV (CMS/[facility]), CHF (congestive heart failure) (CMS/[facility]), Chronic obstructive pulmonary disease with (acute) exacerbation (CMS/[facility]) (12/08/2022), Coronary artery disease, Cough, unspecified, Disorder of prostate, unspecified, Encounter for general adult medical examination without abnormal findings (12/09/2021), Encounter for immunization (10/16/2020), Functional dyspepsia, Hypertension, Ischemic cardiomyopathy, Local infection of the skin and subcutaneous tissue, unspecified (09/08/2020), Mixed hyperlipidemia (12/09/2021), Muscle weakness (generalized), Nicotine dependence, cigarettes, uncomplicated (10/16/2020), Other iron deficiency anemias (10/11/2021), Other spondylosis, cervical region (03/16/2022), Pain in leg, unspecified (03/16/2022), Personal history of other diseases of the circulatory system, Personal history of other diseases of the circulatory system, Personal history of other diseases of the circulatory system (12/09/2021), Personal history of other diseases of the circulatory system (03/16/2022), Personal history of other diseases of the musculoskeletal system and connective tissue, Personal history of other diseases of the musculoskeletal system and connective tissue (03/16/2022), Personal history of  other diseases of the musculoskeletal system and connective tissue (03/16/2022), Personal history of other specified conditions, Personal history of other specified conditions, Personal history of other specified conditions, Postnasal drip, 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    Surgical History  He has a past surgical history that includes Coronary angioplasty with stent (04/27/2017); Other surgical history (11/09/2021); Other surgical history (11/09/2021); Other surgical history (11/09/2021); Other surgical history (11/09/2021); Other surgical history (04/03/2019); and Cardiac catheterization (Left, 1/15/2024).     Family History  Family History   Problem Relation Name Age of Onset    Alzheimer's disease Mother  74    Other (old age) Father  81     Allergies  Simvastatin    Review of Systems     Physical Exam  GEN: eyes open, audible secretions    Last Recorded Vitals  Blood pressure 172/79, pulse 94, temperature 37.1 °C (98.8 °F), resp. rate 20, SpO2 96 %.    Relevant Results  No current facility-administered medications on file prior to encounter.     Current Outpatient Medications on File Prior to Encounter   Medication Sig Dispense Refill    albuterol 2.5 mg /3 mL (0.083 %) nebulizer solution USE 1 UNIT DOSE EVERY 4-6 HOURS AS NEEDED FOR WHEEZING .      albuterol 90 mcg/actuation inhaler Inhale 2 puffs by mouth up to every 4 hours as needed for shortness of breath, wheezing, or prior to exercise.      amLODIPine (Norvasc) 5 mg tablet Take 1 tablet (5 mg) by mouth once daily.      aspirin 81 mg chewable tablet Chew 1 tablet (81 mg) once daily.      atorvastatin (Lipitor) 40 mg tablet Take 1 tablet (40 mg) by mouth once daily at bedtime.      cholecalciferol (Vitamin D-3) 25 MCG (1000 UT) tablet TAKE AS DIRECTED.      clopidogrel (Plavix) 75 mg tablet Take 1 tablet (75 mg) by mouth once daily.      furosemide (Lasix) 20 mg tablet Take 1 tablet (20 mg) by mouth once daily.       gabapentin (Neurontin) 300 mg capsule take 2-4 capsules BY MOUTH THREE TIMES DAILY AS DIRECTED 1080 capsule 3    glycopyrrolate (Robinul) 200 mcg/mL injection Infuse 1 mL (0.2 mg) into a venous catheter every 4 hours if needed (excess secretions). 1 mL     haloperidol lactate (Haldol) 5 mg/mL injection Infuse 0.2 mL (1 mg) into a venous catheter every 4 hours if needed for agitation (delirium). 1 mL     ipratropium-albuteroL (Duo-Neb) 0.5-2.5 mg/3 mL nebulizer solution Take 3 mL by nebulization every 4 hours if needed for wheezing or shortness of breath. 180 mL 11    lisinopril 5 mg tablet Take 1 tablet (5 mg) by mouth once daily.      LORazepam (Ativan) 2 mg/mL injection Infuse 0.25 mL (0.5 mg) over 5 minutes into a venous catheter every 1 hour if needed for anxiety (anxiety, restlessness, insomnia).  0    methadone (Dolophine) 5 mg tablet Take 1 tablet (5 mg) by mouth every 6 hours.      metoprolol succinate XL (Toprol-XL) 50 mg 24 hr tablet TAKE 1 TABLET BY MOUTH DAILY IN THE EVENING 90 tablet 3    morphine 2 mg/mL injection Infuse 1 mL (2 mg) into a venous catheter every 4 hours if needed for severe pain (7 - 10) or moderate pain (4 - 6).  0    naloxone (Narcan) 4 mg/0.1 mL nasal spray USE 1 SPRAY IN ONE NOSTRILONCE AS NEEDED FOR OVERDOSE SYMPTOMS. MAY REPEAT DOSE IF NEEDED IN 2-3 MINUTES IN YOUR OTHER NOSTRIL.      nitroglycerin (Nitrostat) 0.4 mg SL tablet DISSOLVE 1 TABLET UNDER THE TONGUE AS NEEDED FOR CHEST PAIN- MAY REPEA      oxygen (O2) gas therapy Inhale 1 each once every 24 hours.      pantoprazole (ProtoNix) 40 mg EC tablet TAKE ONE TABLET BY MOUTH DAILY 90 tablet 3    [DISCONTINUED] methadone (Dolophine) 5 mg tablet Take 1 tablet (5 mg) by mouth every 6 hours if needed for severe pain (7 - 10). Do not start before February 11, 2024. 120 tablet 0      No labs, hospice pt     Assessment/Plan   -Discussed case with hospice RN, palliative care reengaged for continuity of and and family support.  Pt  seen earlier today, appears comfortable at this time.    -supportive visit with significant other at bedside.    I spent 35 minutes in the professional and overall care of this patient.      Starr Edmond, MICAELA-CNP

## 2024-03-15 NOTE — NURSING NOTE
[place]5 LEXII Peacock- GIP day 3    [facility] RN visit made for GIP. On arrival patient restless and dyspneic. Medicated by bedside RN with Haldol and Morphine IVP. Patient requiring frequent interventions for comfort, received 14mg total IVP Morphine in last 24 hours. Patient resting comfortably after interventions this morning. Provided support to family at bedside, helped answer questions, and printed out cremation resources. Family confirmed they would like to bring patient home to provide care, if discharge becomes plan of care.    [facility] to follow up tomorrow.    Update provided to:  Dr Warner Butler, FAVIO Edmond, palliative CNP  Marlene Green, CM  Melida Campos, DAMARIS Bruce, MAURI Chand, [facility] RN  [phone]

## 2024-03-15 NOTE — PROGRESS NOTES
Luis Peacock Jr. is a 73 y.o. male on day 2 of admission presenting with No Principal Problem: There is no principal problem currently on the Problem List. Please update the Problem List and refresh..      Subjective   Patient is restless, on oxygen, dyspneic, and as needed Haldol and morphine  Family at bedside and all questions were answered.  Patient is hospice GIP    Objective     Last Recorded Vitals  /81   Pulse 82   Temp 36.9 °C (98.4 °F)   Resp 17   SpO2 97%   Intake/Output last 3 Shifts:    Intake/Output Summary (Last 24 hours) at 3/15/2024 1157  Last data filed at 3/15/2024 0440  Gross per 24 hour   Intake 740.83 ml   Output 1000 ml   Net -259.17 ml       Admission Weight       Daily Weight  03/10/24 : (!) 41.9 kg (92 lb 6 oz)      Physical Exam    General: Well-developed thin male, on NC, disoriented, ill-appearing  HEENT: AT, NC, no JVD, no lymphadenopathy, neck supple  Lungs: Generalized coarse breath sounds, tachypneic  Cardiac: Normal S1-S2, no murmur, no gallop  Abdomen: Soft, no distention, diminished bowel sound  Extremities: Right AKA, no edema, pulses intact  Neurological: Disoriented, restless    Assessment/Plan   Active Problems:  There are no active Hospital Problems.    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 is a 73 y.o. male, from home with a past medical history of CAD status post PCI, ischemic cardiomyopathy status post ICD, systolic CHF, PVD status post KRYSTLE, HTN, HLD, COPD, who was initially admitted on the regular floor for management of  acute hypoxic respiratory failure in the setting of COPD exacerbation due to RSV upper respiratory tract infection.  Patient was also complaining of abdominal pain.     Floor course:  Patient was started on Droplet precaution, telemetry, oxygen, pain management, aspiration precaution, Solu-Medrol, DuoNeb, Tylenol, Morphine, Protonix, antiemetic   CT abdomen pelvis was done which was positive for abdominal aortic aneurysm with thrombosis and left-sided superficial abdominal rectus hematoma. Pt was started on heparin infusion, vascular surgery was consulted and recommended no intervention. General surgery consulted for abdominal wall hematoma, recommended no intervention.  Pacemaker firing rate. Device interrogation revealed pacer sense, capture and impedances WNL. No ventricular arrhythmias noted. No AT/AF noted.      On 3/4/24 at 12:44 pm pt was more lethargic, placed stat CT head, ammonia, TSH, repeat blood culture and UA with urine culture, ABG, urine tox. I will also consult ICU to evaluate the pt. afterwards pt was transferred to the ICU for higher level of care.  ABG was consistent with hypercapnic respiratory acidosis.  Initially patient was started on BiPAP.  In the ICU patient developed GI bleeding and hyperkalemia.  GI was consulted and patient made n.p.o. and underwent EGD.  Heparin reversed with protamine.  EGD with linear trauma esophagus, epi injection done.  Meanwhile patient was a started on norepinephrine infusion. Repeat EGD showed for large 4 large angioectasias in the fundus of the stomach with active bleeding, 7 lesions were ablated. Patient was hypertensive, administered IV hydralazine which resolved hypertension.  On 3/8 pt got intubated, remained hypertensive and tachycardic.  On 3/9 patient failed SBT due to hypotension and tachypnea, continued on sedation with Precedex infusion.  On 3/10/2024 patient was extubated and placed on oxygen and BiPAP.  Was following simple commands.  Remained  drowsy and confused.  Hemodynamically stable.  Patient continued to have leukocytosis.  Overall patient was poor prognosis and palliative was consulted.  They had a meeting with the family and they decided to go with hospice care.  Hospice meeting happened today on 3/13/24 and patient is ready for discharge to hospice [facility].  He will be started on hospice level of care and hospice meds.  Per family request I will also start the patient on a lower rate of IV fluid hydration.  Patient is not able to take anything per oral.  He is currently on oxygen via nasal cannula.  He is disoriented.  Family at bedside and all questions were answered.        3/14/2024:  Patient is hospice GIP, on hospice meds, no need for any aggressive management or blood draw    3/15/2024:  Patient is restless, on oxygen, dyspneic, and as needed Haldol and morphine  Family at bedside and all questions were answered.  Patient is hospice GIP, no need for any aggressive management or lab workup    Warner Rodas MD

## 2024-03-16 NOTE — CARE PLAN
Problem: Fall/Injury  Goal: Not fall by end of shift  Outcome: Progressing     The patient's goals for the shift include maintain safety    The clinical goals for the shift include maintain comfort

## 2024-03-16 NOTE — PROGRESS NOTES
Luis Peacock Jr. is a 73 y.o. male on day 3 of admission presenting with No Principal Problem: There is no principal problem currently on the Problem List. Please update the Problem List and refresh..      Subjective   Disoriented, has respiratory distress, on oxygen via nasal cannula  Patient is hospice GIP on hospice meds    Objective     Last Recorded Vitals  /82 (BP Location: Left arm, Patient Position: Lying)   Pulse (!) 116   Temp 37.3 °C (99.1 °F) (Temporal)   Resp 12   SpO2 95%   Intake/Output last 3 Shifts:    Intake/Output Summary (Last 24 hours) at 3/16/2024 1014  Last data filed at 3/16/2024 0900  Gross per 24 hour   Intake 1759.17 ml   Output 950 ml   Net 809.17 ml         Daily Weight  03/10/24 : (!) 41.9 kg (92 lb 6 oz)    Physical Exam  General: Well-developed thin male, on NC, disoriented, ill-appearing  HEENT: AT, NC, no JVD, no lymphadenopathy, neck supple  Lungs: Generalized coarse breath sounds  Cardiac: Tachycardic, no murmur, no gallop  Abdomen: Soft, no distention, diminished bowel sound  Extremities: Right AKA, no edema, pulses intact  Neurological: Disoriented      Assessment/Plan   Active Problems:  There are no active Hospital Problems.    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 is a 73 y.o. male, from home with a past medical history of CAD status post PCI, ischemic cardiomyopathy status post ICD, systolic CHF, PVD status post KRYSTLE, HTN, HLD, COPD, who was initially admitted on the regular floor for management of acute hypoxic respiratory  failure in the setting of COPD exacerbation due to RSV upper respiratory tract infection.  Patient was also complaining of abdominal pain.     Floor course:  Patient was started on Droplet precaution, telemetry, oxygen, pain management, aspiration precaution, Solu-Medrol, DuoNeb, Tylenol, Morphine, Protonix, antiemetic   CT abdomen pelvis was done which was positive for abdominal aortic aneurysm with thrombosis and left-sided superficial abdominal rectus hematoma. Pt was started on heparin infusion, vascular surgery was consulted and recommended no intervention. General surgery consulted for abdominal wall hematoma, recommended no intervention.  Pacemaker firing rate. Device interrogation revealed pacer sense, capture and impedances WNL. No ventricular arrhythmias noted. No AT/AF noted.      On 3/4/24 at 12:44 pm pt was more lethargic, placed stat CT head, ammonia, TSH, repeat blood culture and UA with urine culture, ABG, urine tox. I will also consult ICU to evaluate the pt. afterwards pt was transferred to the ICU for higher level of care.  ABG was consistent with hypercapnic respiratory acidosis.  Initially patient was started on BiPAP.  In the ICU patient developed GI bleeding and hyperkalemia.  GI was consulted and patient made n.p.o. and underwent EGD.  Heparin reversed with protamine.  EGD with linear trauma esophagus, epi injection done.  Meanwhile patient was a started on norepinephrine infusion. Repeat EGD showed for large 4 large angioectasias in the fundus of the stomach with active bleeding, 7 lesions were ablated. Patient was hypertensive, administered IV hydralazine which resolved hypertension.  On 3/8 pt got intubated, remained hypertensive and tachycardic.  On 3/9 patient failed SBT due to hypotension and tachypnea, continued on sedation with Precedex infusion.  On 3/10/2024 patient was extubated and placed on oxygen and BiPAP.  Was following simple commands.  Remained drowsy and confused.   Hemodynamically stable.  Patient continued to have leukocytosis.  Overall patient was poor prognosis and palliative was consulted.  They had a meeting with the family and they decided to go with hospice care.  Hospice meeting happened today on 3/13/24 and patient is ready for discharge to hospice [facility].  He will be started on hospice level of care and hospice meds.  Per family request I will also start the patient on a lower rate of IV fluid hydration.  Patient is not able to take anything per oral.  He is currently on oxygen via nasal cannula.  He is disoriented.  Family at bedside and all questions were answered.        3/14/2024:  Patient is hospice GIP, on hospice meds, no need for any aggressive management or blood draw     3/15/2024:  Patient is restless, on oxygen, dyspneic, and as needed Haldol and morphine  Family at bedside and all questions were answered.  Patient is hospice GIP, no need for any aggressive management or lab workup    3/16/2024:  Patient is disoriented, has respiratory distress, on oxygen by nasal cannula, on hospice meds  Patient is hospice [facility], will continue with current plan for now  No aggressive intervention or lab workup      Warner Rodas MD

## 2024-03-17 NOTE — NURSING NOTE
RN Hospice Note    Luis Peacock Jr. is a Hospice Patient.   Hospice terminal diagnosis: COPD  Physician: Dr. Warner Nuñez  Visit type: GIP Visit    Comments/recommendations: Pt remains unresponsive and actively declining.  Respirations labored. Tachypnea noted.  Reviewed pt symptoms and current comfort interventions in place with [facility] NP who gave recommendations to change morphine to morphine 4mg IV every 6 hrs around the clock with morphine 4mg IV every 30 minutes as needed for pain/shortness of breath.  Recommendations presented to Dr. Rodas who placed orders.  Cesar Peacock, brother and his wife at bedside. This RN spoke to pt sister, Ana Gutierrez and pt significant other, Lucia Saleh via phone.  Visit update given.  Discussed pt current respiratory status and recommendations from hospice provider that were presented/ordered by hospital physician.  All verbalized agreement.  Discussed IVF's may be causing more of a burden than a benefit at this time.  Family not agreeable to stop at this time despite training on no benefit.  Discussed need for discharge should pt linger and/or hospital requesting discharge.  Discussed hospice inpatient unit-trained on need to have  home name identified, also advised IVF's would not continue.  No  home name has been identified at this time.  Offered recommendations to get pt other siblings on the phone that live out of state and place phone to pt ear so they can give pt permission to die and say their goodbyes. This was also discussed with Lucia, pt significant other day prior and today-Lucia has not yet been able to do this.  Support provided. Hospice to follow up 3/18/2024.    Plan of care reviewed with patient/family members:   1) Cesar Peacock, brother  2) Ana Matt, sister (via phone)  3) Lucia Saleh, supa other (via phone)     Plan of care reviewed with hospital staff members:   1) Lorena Garcia RN  2) DAMARIS Cowan  3)  Dr. Warner Rodas     Please notify [facility] of any changes in condition. Thank you.  Office: [phone] (8 am-6:30 pm M-F and 8 am-4:30 pm weekends and holidays)   [phone] (6:30 pm-8 am M-F and 4:30 pm-8 am weekends and holidays)    Debi Cazares RN

## 2024-03-17 NOTE — PROGRESS NOTES
Luis Peacock Jr. is a 73 y.o. male on day 4 of admission presenting with No Principal Problem: There is no principal problem currently on the Problem List. Please update the Problem List and refresh..      Subjective   Patient is tachypneic, tachycardic, on oxygen, disoriented  Morphine dose was adjusted  Patient is hospice GIP    Objective     Last Recorded Vitals  /82 (BP Location: Left arm, Patient Position: Lying)   Pulse (!) 116   Temp 37.3 °C (99.1 °F) (Temporal)   Resp 12   SpO2 95%   Intake/Output last 3 Shifts:    Intake/Output Summary (Last 24 hours) at 3/17/2024 1202  Last data filed at 3/17/2024 0800  Gross per 24 hour   Intake 1150 ml   Output 2450 ml   Net -1300 ml         Daily Weight  03/10/24 : (!) 41.9 kg (92 lb 6 oz)        Physical Exam  General: on NC, disoriented, ill-appearing, has resp distress   HEENT: AT, NC, no JVD, no lymphadenopathy, neck supple  Lungs: Generalized coarse breath sounds and crackles, tachypneic  Cardiac: Tachycardic, no murmur, no gallop  Abdomen: Soft, no distention, diminished bowel sound  Extremities: Right AKA, no edema, pulses intact  Neurological: Disoriented, restless    Assessment/Plan   Active Problems:  There are no active Hospital Problems.    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 is a 73 y.o. male, from home with a past medical history of CAD status post PCI, ischemic cardiomyopathy status post ICD, systolic CHF, PVD status post KRYSTLE, HTN, HLD, COPD, who was initially admitted on the regular floor for  management of acute hypoxic respiratory failure in the setting of COPD exacerbation due to RSV upper respiratory tract infection.  Patient was also complaining of abdominal pain.     Floor course:  Patient was started on Droplet precaution, telemetry, oxygen, pain management, aspiration precaution, Solu-Medrol, DuoNeb, Tylenol, Morphine, Protonix, antiemetic   CT abdomen pelvis was done which was positive for abdominal aortic aneurysm with thrombosis and left-sided superficial abdominal rectus hematoma. Pt was started on heparin infusion, vascular surgery was consulted and recommended no intervention. General surgery consulted for abdominal wall hematoma, recommended no intervention.  Pacemaker firing rate. Device interrogation revealed pacer sense, capture and impedances WNL. No ventricular arrhythmias noted. No AT/AF noted.      On 3/4/24 at 12:44 pm pt was more lethargic, placed stat CT head, ammonia, TSH, repeat blood culture and UA with urine culture, ABG, urine tox. I will also consult ICU to evaluate the pt. afterwards pt was transferred to the ICU for higher level of care.  ABG was consistent with hypercapnic respiratory acidosis.  Initially patient was started on BiPAP.  In the ICU patient developed GI bleeding and hyperkalemia.  GI was consulted and patient made n.p.o. and underwent EGD.  Heparin reversed with protamine.  EGD with linear trauma esophagus, epi injection done.  Meanwhile patient was a started on norepinephrine infusion. Repeat EGD showed for large 4 large angioectasias in the fundus of the stomach with active bleeding, 7 lesions were ablated. Patient was hypertensive, administered IV hydralazine which resolved hypertension.  On 3/8 pt got intubated, remained hypertensive and tachycardic.  On 3/9 patient failed SBT due to hypotension and tachypnea, continued on sedation with Precedex infusion.  On 3/10/2024 patient was extubated and placed on oxygen and BiPAP.  Was following simple commands.   Remained drowsy and confused.  Hemodynamically stable.  Patient continued to have leukocytosis.  Overall patient was poor prognosis and palliative was consulted.  They had a meeting with the family and they decided to go with hospice care.  Hospice meeting happened today on 3/13/24 and patient is ready for discharge to hospice [facility].  He will be started on hospice level of care and hospice meds.  Per family request I will also start the patient on a lower rate of IV fluid hydration.  Patient is not able to take anything per oral.  He is currently on oxygen via nasal cannula.  He is disoriented.  Family at bedside and all questions were answered.        3/14/2024:  Patient is hospice GIP, on hospice meds, no need for any aggressive management or blood draw     3/15/2024:  Patient is restless, on oxygen, dyspneic, and as needed Haldol and morphine  Family at bedside and all questions were answered.  Patient is hospice GIP, no need for any aggressive management or lab workup    3/17/2024:  Patient is tachypneic, tachycardic, on oxygen, disoriented  Morphine dose was adjusted  Patient is hospice [facility]  No need for any aggressive intervention or lab workup    Warner Rodas MD

## 2024-03-18 NOTE — SIGNIFICANT EVENT
I was called by nursing staff to pronounce the patient at bedside.    General: Patient does not respond to stimuli  HEENT: Pupils are fixed and dilated, not reactive  Cardiovascular: No heart sounds auscultated  Respiratory: No breath sounds auscultated  Vascular: No carotid artery pulse palpated    Preliminary cause of death: Cardiopulmonary arrest  Date and time of death: 1:20 on 3/18/2024    Patient's family was spoken to over the phone by nursing, condolences were provided.      ---Of note, this documentation is completed using the Dragon Dictation system (voice recognition software). There may be spelling and/or grammatical errors that were not corrected prior to final submission.---

## 2024-03-18 NOTE — NURSING NOTE
Upon assessment patient found to be unresponsive, pupils fixed, and with absence of vital signs. Patient is a DNRCC, Dr. Palmer notified.

## 2024-03-18 NOTE — NURSING NOTE
Hospice Nurse spoke to patients brother Cesar Peacock and sister Ana Gutierrez and they both will not be coming to the hospital and they have not chosen a  Home yet.

## 2024-03-18 NOTE — NURSING NOTE
Patients brittany Frye notified of patients death. She stated she will not be coming up to the hospital and to notify pt's sister and brother.

## 2024-03-18 NOTE — NURSING NOTE
Documents given to ER Nurse Manager Luis, [facility] with m[facility]ue time of 03:50.

## 2024-03-20 NOTE — DOCUMENTATION CLARIFICATION NOTE
PATIENT:               ALEX CLOUD  ACCT #:                  [id number]  MRN:                       [id number]  :                       1950  ADMIT DATE:       3/3/2024 6:37 AM  DISCH DATE:        3/13/2024 3:45 PM  RESPONDING PROVIDER #:        [id number]          PROVIDER RESPONSE TEXT:    Viral PNA due to RSV    CDI QUERY TEXT:    [facility]_Pneumonia Specificity        Instruction:    Based on your assessment of the patient and the clinical information, please provide the requested documentation by clicking on the appropriate radio button and enter any additional information if prompted.    Question: Please further specify the type of pneumonia being treated    When answering this query, please exercise your independent professional judgment. The fact that a question is being asked, does not imply that any particular answer is desired or expected.    The patient's clinical indicators include:  Clinical Information: 72 yo male admitted with COPD exac. and RSV    Clinical Indicators:  3/3 vital signs T37 HR 85 R22 /108  90% on venturi mask  3/3 WBC 6.8  No sputum cultures    3/3 RSV detected    ED note, Acute COPD exacerbation acute pneumonia patient was worked up already had urine steroids and nebulizer treatment by EMS, patient pulse ox on room air was dropping 85% and at this time patient was put on supplemental oxygen discussed with the hospitalist team and admitted to the service.,    Treatment:  Methyl prednisone , Duo neb  Supplemental oxygen  No antibiotics given    Risk Factors: COPD , acute resp. failure, smoker HTN  Options provided:  -- PNA ruled out after work up  -- Viral PNA due to RSV  -- Other - I will add my own diagnosis  -- Refer to Clinical Documentation Reviewer    Query created by: Ariela Ferguson on 3/20/2024 6:31 AM      Electronically signed by:  KARMEN MCDONALD DO 3/20/2024 7:10 AM

## 2024-03-25 NOTE — DISCHARGE SUMMARY
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 is a 73 y.o. male, from home with a past medical history of CAD status post PCI, ischemic cardiomyopathy status post ICD, systolic CHF, PVD status post KRYSTLE, HTN, HLD, COPD, who was initially admitted on the regular floor for management of acute hypoxic respiratory failure in the setting of COPD exacerbation due to RSV upper respiratory tract infection.  Patient was also complaining of abdominal pain.     Floor course:  Patient was started on Droplet precaution, telemetry, oxygen, pain management, aspiration precaution, Solu-Medrol, DuoNeb, Tylenol, Morphine, Protonix, antiemetic   CT abdomen pelvis was done which was positive for abdominal aortic aneurysm with thrombosis and left-sided superficial abdominal rectus hematoma. Pt was started on heparin infusion, vascular surgery was consulted and recommended no intervention. General surgery consulted for abdominal wall hematoma, recommended no intervention.  Pacemaker firing rate. Device interrogation revealed pacer sense, capture and impedances WNL. No ventricular arrhythmias noted. No AT/AF noted.      On 3/4/24 at 12:44 pm pt was more lethargic, placed stat CT head, ammonia, TSH, repeat blood culture and UA with urine culture, ABG, urine tox. I will also consult ICU to evaluate the pt. afterwards pt was transferred to the ICU for higher level of care.  ABG was  consistent with hypercapnic respiratory acidosis.  Initially patient was started on BiPAP.  In the ICU patient developed GI bleeding and hyperkalemia.  GI was consulted and patient made n.p.o. and underwent EGD.  Heparin reversed with protamine.  EGD with linear trauma esophagus, epi injection done.  Meanwhile patient was a started on norepinephrine infusion. Repeat EGD showed for large 4 large angioectasias in the fundus of the stomach with active bleeding, 7 lesions were ablated. Patient was hypertensive, administered IV hydralazine which resolved hypertension.  On 3/8 pt got intubated, remained hypertensive and tachycardic.  On 3/9 patient failed SBT due to hypotension and tachypnea, continued on sedation with Precedex infusion.  On 3/10/2024 patient was extubated and placed on oxygen and BiPAP.  Was following simple commands.  Remained drowsy and confused.  Hemodynamically stable.  Patient continued to have leukocytosis.  Overall patient was poor prognosis and palliative was consulted.  They had a meeting with the family and they decided to go with hospice care.  Hospice meeting happened today on 3/13/24 and patient is ready for discharge to hospice [facility].  He will be started on hospice level of care and hospice meds.  Per family request I will also start the patient on a lower rate of IV fluid hydration.  Patient is not able to take anything per oral.  He is currently on oxygen via nasal cannula.  He is disoriented.  Family at bedside and all questions were answered.        3/14/2024:  Patient is hospice GIP, on hospice meds, no need for any aggressive management or blood draw     3/15/2024:  Patient is restless, on oxygen, dyspneic, and as needed Haldol and morphine  Family at bedside and all questions were answered.  Patient is hospice GIP, no need for any aggressive management or lab workup     3/17/2024:  Patient is tachypneic, tachycardic, on oxygen, disoriented  Morphine dose was adjusted  Patient is  hospice GIP  No need for any aggressive intervention or lab workup    3/18/2024:  The night  Was called by nursing staff to pronounce the patient at bedside    Physical exam:  General: Patient does not respond to stimuli  HEENT: Pupils are fixed and dilated, not reactive  Cardiovascular: No heart sounds auscultated  Respiratory: No breath sounds auscultated  Vascular: No carotid artery pulse palpated     Preliminary cause of death: Cardiopulmonary arrest  Date and time of death: 1:20 on 3/18/2024     Patient's family was spoken to over the phone by nursing, condolences were provided.      Warner Rodas MD

## 2024-04-16 ENCOUNTER — APPOINTMENT (OUTPATIENT)
Dept: CARDIOLOGY | Facility: CLINIC | Age: 74
End: 2024-04-16
Payer: OTHER MISCELLANEOUS

## 2024-08-07 ENCOUNTER — APPOINTMENT (OUTPATIENT)
Dept: CARDIOLOGY | Facility: HOSPITAL | Age: 74
End: 2024-08-07
Payer: MEDICARE

## 2024-08-07 ENCOUNTER — APPOINTMENT (OUTPATIENT)
Dept: CARDIOLOGY | Facility: CLINIC | Age: 74
End: 2024-08-07
Payer: OTHER MISCELLANEOUS

## (undated) DEVICE — SHEATH, GLIDESHEATH, SLENDER, 6FR 10CM

## (undated) DEVICE — BAND, VASCULAR, RADIAL HEMOSTAT, REGULAR 24CM

## (undated) DEVICE — CATHETER, OPTITORQUE, 5FR, JACKY, 3.5/ 2H/110CM, CURVED

## (undated) DEVICE — TUBING, MANIFOLD, LOW PRESSURE